# Patient Record
Sex: FEMALE | Race: BLACK OR AFRICAN AMERICAN | Employment: UNEMPLOYED
[De-identification: names, ages, dates, MRNs, and addresses within clinical notes are randomized per-mention and may not be internally consistent; named-entity substitution may affect disease eponyms.]

---

## 2017-01-04 ENCOUNTER — TELEPHONE (OUTPATIENT)
Dept: GYNECOLOGIC ONCOLOGY | Facility: CLINIC | Age: 67
End: 2017-01-04

## 2017-01-30 RX ORDER — GLUCOSAM/CHON-MSM1/C/MANG/BOSW 500-416.6
TABLET ORAL
Qty: 100 EACH | Refills: 0 | Status: SHIPPED | OUTPATIENT
Start: 2017-01-30 | End: 2017-03-01 | Stop reason: SDUPTHER

## 2017-03-01 RX ORDER — ISOPROPYL ALCOHOL 0.75 G/1
SWAB TOPICAL
Qty: 100 EACH | Refills: 0 | Status: SHIPPED | OUTPATIENT
Start: 2017-03-01 | End: 2017-03-17 | Stop reason: SDUPTHER

## 2017-03-01 RX ORDER — GLUCOSAM/CHON-MSM1/C/MANG/BOSW 500-416.6
TABLET ORAL
Qty: 100 EACH | Refills: 0 | Status: SHIPPED | OUTPATIENT
Start: 2017-03-01 | End: 2017-03-17 | Stop reason: SDUPTHER

## 2017-03-01 RX ORDER — INSULIN GLARGINE 100 [IU]/ML
INJECTION, SOLUTION SUBCUTANEOUS
Qty: 30 VIAL | Refills: 0 | Status: SHIPPED | OUTPATIENT
Start: 2017-03-01 | End: 2017-08-12 | Stop reason: SDUPTHER

## 2017-03-07 ENCOUNTER — HOSPITAL ENCOUNTER (OUTPATIENT)
Age: 67
Setting detail: SPECIMEN
Discharge: HOME | End: 2017-03-07
Payer: MEDICAID

## 2017-03-07 ENCOUNTER — OFFICE VISIT (OUTPATIENT)
Dept: INTERNAL MEDICINE | Facility: CLINIC | Age: 67
End: 2017-03-07

## 2017-03-07 VITALS
SYSTOLIC BLOOD PRESSURE: 133 MMHG | HEART RATE: 100 BPM | BODY MASS INDEX: 34.02 KG/M2 | HEIGHT: 63 IN | DIASTOLIC BLOOD PRESSURE: 73 MMHG | WEIGHT: 192 LBS

## 2017-03-07 DIAGNOSIS — K59.00 CONSTIPATION, UNSPECIFIED CONSTIPATION TYPE: ICD-10-CM

## 2017-03-07 DIAGNOSIS — R06.02 SHORTNESS OF BREATH: ICD-10-CM

## 2017-03-07 DIAGNOSIS — R19.00 PELVIC MASS IN FEMALE: ICD-10-CM

## 2017-03-07 DIAGNOSIS — E11.65 TYPE 2 DIABETES MELLITUS WITH HYPERGLYCEMIA, WITH LONG-TERM CURRENT USE OF INSULIN (HCC): ICD-10-CM

## 2017-03-07 DIAGNOSIS — E11.65 TYPE 2 DIABETES MELLITUS WITH HYPERGLYCEMIA, WITH LONG-TERM CURRENT USE OF INSULIN (HCC): Primary | ICD-10-CM

## 2017-03-07 DIAGNOSIS — R91.1 LUNG NODULE: ICD-10-CM

## 2017-03-07 DIAGNOSIS — Z79.4 TYPE 2 DIABETES MELLITUS WITH HYPERGLYCEMIA, WITH LONG-TERM CURRENT USE OF INSULIN (HCC): ICD-10-CM

## 2017-03-07 DIAGNOSIS — Z79.4 TYPE 2 DIABETES MELLITUS WITH HYPERGLYCEMIA, WITH LONG-TERM CURRENT USE OF INSULIN (HCC): Primary | ICD-10-CM

## 2017-03-07 LAB
ABSOLUTE EOS #: 0.1 K/UL (ref 0–0.4)
ABSOLUTE LYMPH #: 3.3 K/UL (ref 1–4.8)
ABSOLUTE MONO #: 0.49 K/UL (ref 0.1–0.8)
ALBUMIN SERPL-MCNC: 4.1 G/DL (ref 3.5–5.2)
ALBUMIN/GLOBULIN RATIO: 1.2 (ref 1–2.5)
ALP BLD-CCNC: 118 U/L (ref 35–104)
ALT SERPL-CCNC: 15 U/L (ref 5–33)
ANION GAP SERPL CALCULATED.3IONS-SCNC: 13 MMOL/L (ref 9–17)
AST SERPL-CCNC: 18 U/L
BASOPHILS # BLD: 0 % (ref 0–2)
BASOPHILS ABSOLUTE: 0 K/UL (ref 0–0.2)
BILIRUB SERPL-MCNC: 0.63 MG/DL (ref 0.3–1.2)
BUN BLDV-MCNC: 5 MG/DL (ref 8–23)
BUN/CREAT BLD: ABNORMAL (ref 9–20)
CALCIUM SERPL-MCNC: 9.7 MG/DL (ref 8.6–10.4)
CHLORIDE BLD-SCNC: 98 MMOL/L (ref 98–107)
CO2: 24 MMOL/L (ref 20–31)
CREAT SERPL-MCNC: 0.74 MG/DL (ref 0.5–0.9)
DIFFERENTIAL TYPE: ABNORMAL
EOSINOPHILS RELATIVE PERCENT: 1 % (ref 1–4)
ESTIMATED AVERAGE GLUCOSE: 381 MG/DL
GFR AFRICAN AMERICAN: >60 ML/MIN
GFR NON-AFRICAN AMERICAN: >60 ML/MIN
GFR SERPL CREATININE-BSD FRML MDRD: ABNORMAL ML/MIN/{1.73_M2}
GFR SERPL CREATININE-BSD FRML MDRD: ABNORMAL ML/MIN/{1.73_M2}
GLUCOSE BLD-MCNC: 331 MG/DL (ref 70–99)
HBA1C MFR BLD: 14.9 % (ref 4–6)
HCT VFR BLD CALC: 29.5 % (ref 36–46)
HEMOGLOBIN: 9 G/DL (ref 12–16)
LYMPHOCYTES # BLD: 34 % (ref 24–44)
MCH RBC QN AUTO: 18.9 PG (ref 26–34)
MCHC RBC AUTO-ENTMCNC: 30.7 G/DL (ref 31–37)
MCV RBC AUTO: 61.6 FL (ref 80–100)
MONOCYTES # BLD: 5 % (ref 1–7)
MORPHOLOGY: ABNORMAL
PDW BLD-RTO: 24.2 % (ref 12.5–15.4)
PLATELET # BLD: 141 K/UL (ref 140–450)
PLATELET ESTIMATE: ABNORMAL
PMV BLD AUTO: 9.2 FL (ref 6–12)
POTASSIUM SERPL-SCNC: 3.6 MMOL/L (ref 3.7–5.3)
RBC # BLD: 4.79 M/UL (ref 4–5.2)
RBC # BLD: ABNORMAL 10*6/UL
SEG NEUTROPHILS: 60 % (ref 36–66)
SEGMENTED NEUTROPHILS ABSOLUTE COUNT: 5.81 K/UL (ref 1.8–7.7)
SODIUM BLD-SCNC: 135 MMOL/L (ref 135–144)
TOTAL PROTEIN: 7.6 G/DL (ref 6.4–8.3)
WBC # BLD: 9.7 K/UL (ref 3.5–11)
WBC # BLD: ABNORMAL 10*3/UL

## 2017-03-07 PROCEDURE — 80053 COMPREHEN METABOLIC PANEL: CPT

## 2017-03-07 PROCEDURE — 36415 COLL VENOUS BLD VENIPUNCTURE: CPT

## 2017-03-07 PROCEDURE — 83036 HEMOGLOBIN GLYCOSYLATED A1C: CPT

## 2017-03-07 PROCEDURE — 99213 OFFICE O/P EST LOW 20 MIN: CPT | Performed by: INTERNAL MEDICINE

## 2017-03-07 PROCEDURE — 85025 COMPLETE CBC W/AUTO DIFF WBC: CPT

## 2017-03-07 RX ORDER — TRAMADOL HYDROCHLORIDE 50 MG/1
50 TABLET ORAL 2 TIMES DAILY PRN
Qty: 30 TABLET | Refills: 0 | OUTPATIENT
Start: 2017-03-07 | End: 2017-03-31 | Stop reason: SDUPTHER

## 2017-03-07 RX ORDER — PANTOPRAZOLE SODIUM 40 MG/1
40 GRANULE, DELAYED RELEASE ORAL
Qty: 30 EACH | Refills: 2 | Status: SHIPPED | OUTPATIENT
Start: 2017-03-07 | End: 2017-11-13 | Stop reason: SDUPTHER

## 2017-03-07 RX ORDER — BLOOD-GLUCOSE METER
1 KIT MISCELLANEOUS 2 TIMES DAILY
Qty: 1 KIT | Refills: 0 | OUTPATIENT
Start: 2017-03-07 | End: 2017-03-31 | Stop reason: SDUPTHER

## 2017-03-07 RX ORDER — DOCUSATE SODIUM 100 MG/1
100 CAPSULE, LIQUID FILLED ORAL DAILY PRN
Qty: 30 CAPSULE | Refills: 3 | Status: SHIPPED | OUTPATIENT
Start: 2017-03-07 | End: 2019-06-24 | Stop reason: ALTCHOICE

## 2017-03-08 ENCOUNTER — TELEPHONE (OUTPATIENT)
Dept: INTERNAL MEDICINE | Facility: CLINIC | Age: 67
End: 2017-03-08

## 2017-03-15 LAB
CONTROL: NORMAL
HEMOCCULT STL QL: NEGATIVE

## 2017-03-20 DIAGNOSIS — Z12.11 SCREENING FOR COLORECTAL CANCER: Primary | ICD-10-CM

## 2017-03-20 DIAGNOSIS — Z12.12 SCREENING FOR COLORECTAL CANCER: Primary | ICD-10-CM

## 2017-03-20 PROCEDURE — 82274 ASSAY TEST FOR BLOOD FECAL: CPT | Performed by: INTERNAL MEDICINE

## 2017-03-31 RX ORDER — BLOOD-GLUCOSE METER
1 KIT MISCELLANEOUS 2 TIMES DAILY
Qty: 1 KIT | Refills: 0 | Status: SHIPPED | OUTPATIENT
Start: 2017-03-31 | End: 2018-07-03 | Stop reason: SDUPTHER

## 2017-03-31 RX ORDER — TRAMADOL HYDROCHLORIDE 50 MG/1
50 TABLET ORAL 2 TIMES DAILY PRN
Qty: 30 TABLET | Refills: 0 | Status: SHIPPED | OUTPATIENT
Start: 2017-03-31 | End: 2018-01-22 | Stop reason: SDUPTHER

## 2017-04-17 RX ORDER — ISOPROPYL ALCOHOL 0.75 G/1
SWAB TOPICAL
Qty: 100 EACH | Refills: 0 | Status: SHIPPED | OUTPATIENT
Start: 2017-04-17 | End: 2017-10-10 | Stop reason: SDUPTHER

## 2017-04-17 RX ORDER — ISOPROPYL ALCOHOL 0.75 G/1
SWAB TOPICAL
Qty: 100 EACH | Refills: 0 | Status: SHIPPED | OUTPATIENT
Start: 2017-04-17 | End: 2017-11-13 | Stop reason: SDUPTHER

## 2017-04-17 RX ORDER — MULTIVITAMIN WITH FOLIC ACID 400 MCG
TABLET ORAL
Qty: 30 TABLET | Refills: 0 | Status: SHIPPED | OUTPATIENT
Start: 2017-04-17 | End: 2017-10-10 | Stop reason: SDUPTHER

## 2017-04-17 RX ORDER — PANTOPRAZOLE SODIUM 40 MG/1
TABLET, DELAYED RELEASE ORAL
Qty: 30 TABLET | Refills: 0 | Status: SHIPPED | OUTPATIENT
Start: 2017-04-17 | End: 2017-10-10 | Stop reason: SDUPTHER

## 2017-04-17 RX ORDER — INSULIN GLARGINE 100 [IU]/ML
INJECTION, SOLUTION SUBCUTANEOUS
Qty: 30 VIAL | Refills: 0 | Status: SHIPPED | OUTPATIENT
Start: 2017-04-17 | End: 2017-07-27 | Stop reason: SDUPTHER

## 2017-04-25 ENCOUNTER — TELEPHONE (OUTPATIENT)
Dept: INTERNAL MEDICINE | Age: 67
End: 2017-04-25

## 2017-05-15 ENCOUNTER — TELEPHONE (OUTPATIENT)
Dept: PODIATRY | Age: 67
End: 2017-05-15

## 2017-05-25 RX ORDER — GABAPENTIN 300 MG/1
300 CAPSULE ORAL 3 TIMES DAILY
Qty: 90 CAPSULE | Refills: 5 | Status: SHIPPED | OUTPATIENT
Start: 2017-05-25 | End: 2017-11-08 | Stop reason: SDUPTHER

## 2017-05-25 RX ORDER — LISINOPRIL 5 MG/1
5 TABLET ORAL DAILY
Qty: 30 TABLET | Refills: 5 | Status: SHIPPED | OUTPATIENT
Start: 2017-05-25 | End: 2017-11-08 | Stop reason: SDUPTHER

## 2017-06-22 ENCOUNTER — PATIENT MESSAGE (OUTPATIENT)
Dept: INTERNAL MEDICINE | Age: 67
End: 2017-06-22

## 2017-06-27 ENCOUNTER — TELEPHONE (OUTPATIENT)
Dept: INTERNAL MEDICINE | Age: 67
End: 2017-06-27

## 2017-07-27 ENCOUNTER — PATIENT MESSAGE (OUTPATIENT)
Dept: INTERNAL MEDICINE | Age: 67
End: 2017-07-27

## 2017-07-27 RX ORDER — INSULIN GLARGINE 100 [IU]/ML
32 INJECTION, SOLUTION SUBCUTANEOUS NIGHTLY
Qty: 10 VIAL | Refills: 1 | Status: SHIPPED | OUTPATIENT
Start: 2017-07-27 | End: 2017-10-10 | Stop reason: SDUPTHER

## 2017-08-10 RX ORDER — PANTOPRAZOLE SODIUM 40 MG/1
TABLET, DELAYED RELEASE ORAL
Qty: 30 TABLET | Refills: 0 | Status: SHIPPED | OUTPATIENT
Start: 2017-08-10 | End: 2017-11-13 | Stop reason: SDUPTHER

## 2017-08-14 RX ORDER — INSULIN GLARGINE 100 [IU]/ML
INJECTION, SOLUTION SUBCUTANEOUS
Qty: 30 VIAL | Refills: 0 | Status: SHIPPED | OUTPATIENT
Start: 2017-08-14 | End: 2017-11-13 | Stop reason: SDUPTHER

## 2017-08-16 RX ORDER — LANCETS 30 GAUGE
EACH MISCELLANEOUS
Qty: 100 EACH | Refills: 0 | Status: SHIPPED | OUTPATIENT
Start: 2017-08-16 | End: 2017-11-13

## 2017-10-04 ENCOUNTER — TELEPHONE (OUTPATIENT)
Dept: INTERNAL MEDICINE | Age: 67
End: 2017-10-04

## 2017-10-04 NOTE — TELEPHONE ENCOUNTER
Call to pt. Last apt in march, trying to reach out get pt scheduled and work on barriers to care for pt , no answer phone just rings does not go to voice mail.

## 2017-10-10 RX ORDER — INSULIN GLARGINE 100 [IU]/ML
32 INJECTION, SOLUTION SUBCUTANEOUS NIGHTLY
Qty: 10 VIAL | Refills: 0 | Status: SHIPPED | OUTPATIENT
Start: 2017-10-10 | End: 2017-11-08 | Stop reason: SDUPTHER

## 2017-10-10 RX ORDER — PANTOPRAZOLE SODIUM 40 MG/1
TABLET, DELAYED RELEASE ORAL
Qty: 30 TABLET | Refills: 0 | Status: SHIPPED | OUTPATIENT
Start: 2017-10-10 | End: 2017-11-08 | Stop reason: SDUPTHER

## 2017-10-10 RX ORDER — MULTIVITAMIN WITH FOLIC ACID 400 MCG
TABLET ORAL
Qty: 30 TABLET | Refills: 0 | Status: SHIPPED | OUTPATIENT
Start: 2017-10-10 | End: 2017-11-08 | Stop reason: SDUPTHER

## 2017-10-10 RX ORDER — ISOPROPYL ALCOHOL 0.75 G/1
SWAB TOPICAL
Qty: 100 EACH | Refills: 0 | Status: SHIPPED | OUTPATIENT
Start: 2017-10-10 | End: 2017-11-08 | Stop reason: SDUPTHER

## 2017-11-07 ENCOUNTER — TELEPHONE (OUTPATIENT)
Dept: OTHER | Facility: CLINIC | Age: 67
End: 2017-11-07

## 2017-11-08 RX ORDER — PANTOPRAZOLE SODIUM 40 MG/1
TABLET, DELAYED RELEASE ORAL
Qty: 30 TABLET | Refills: 0 | Status: SHIPPED | OUTPATIENT
Start: 2017-11-08 | End: 2017-12-07 | Stop reason: SDUPTHER

## 2017-11-08 RX ORDER — LISINOPRIL 5 MG/1
5 TABLET ORAL DAILY
Qty: 30 TABLET | Refills: 0 | Status: SHIPPED | OUTPATIENT
Start: 2017-11-08 | End: 2017-12-07 | Stop reason: SDUPTHER

## 2017-11-08 RX ORDER — INSULIN GLARGINE 100 [IU]/ML
INJECTION, SOLUTION SUBCUTANEOUS
Qty: 10 VIAL | Refills: 0 | Status: SHIPPED | OUTPATIENT
Start: 2017-11-08 | End: 2017-11-13 | Stop reason: SDUPTHER

## 2017-11-08 RX ORDER — MULTIVITAMIN WITH FOLIC ACID 400 MCG
TABLET ORAL
Qty: 30 TABLET | Refills: 0 | Status: SHIPPED | OUTPATIENT
Start: 2017-11-08 | End: 2017-12-07 | Stop reason: SDUPTHER

## 2017-11-08 RX ORDER — ISOPROPYL ALCOHOL 0.75 G/1
SWAB TOPICAL
Qty: 100 EACH | Refills: 0 | Status: SHIPPED | OUTPATIENT
Start: 2017-11-08 | End: 2018-07-03 | Stop reason: SDUPTHER

## 2017-11-08 RX ORDER — GABAPENTIN 300 MG/1
CAPSULE ORAL
Qty: 90 CAPSULE | Refills: 0 | Status: SHIPPED | OUTPATIENT
Start: 2017-11-08 | End: 2017-12-07 | Stop reason: SDUPTHER

## 2017-11-08 NOTE — TELEPHONE ENCOUNTER
Escribe request for 6 medications pended . Please escribe if appropriate      Next Visit Date:  None, cancelled multiple appts, message to pharmacy to add message on script to call for an appt before any additional refills     Health Maintenance   Topic Date Due    Hepatitis C screen  1950    Diabetic foot exam  06/19/2016    Diabetic retinal exam  06/19/2016    Diabetic hemoglobin A1C test  06/07/2017    Flu vaccine (1) 09/01/2017    Pneumococcal low/med risk (2 of 2 - PPSV23) 10/07/2017    Diabetic microalbuminuria test  10/29/2017    Lipid screen  10/29/2017    Colon Cancer Screen FIT/FOBT  03/15/2018    Breast cancer screen  10/29/2018    DTaP/Tdap/Td vaccine (2 - Td) 02/01/2023    Zostavax vaccine  Addressed    DEXA (modify frequency per FRAX score)  Completed       Hemoglobin A1C (%)   Date Value   03/07/2017 14.9 (H)   10/07/2016 6.9   06/19/2015 8.8             ( goal A1C is < 7)   Microalb/Crt.  Ratio (mcg/mg creat)   Date Value   10/29/2016 47 (H)     LDL Cholesterol (mg/dL)   Date Value   10/29/2016 89       (goal LDL is <100)   AST (U/L)   Date Value   03/07/2017 18     ALT (U/L)   Date Value   03/07/2017 15     BUN (mg/dL)   Date Value   03/07/2017 5 (L)     BP Readings from Last 3 Encounters:   03/07/17 133/73   12/01/16 (!) 169/73   12/01/16 136/76          (goal 120/80)          Patient Active Problem List:     Fibroid     Lung nodule     Leiomyomatosis     Restrictive lung disease     Radiculopathy, cervical     Renal mass     DR (diabetic retinopathy) (HCC)     HTN (hypertension)     DM (diabetes mellitus) ([address]     Dermatomyositis ([address]     Immunization due     OA (osteoarthritis)     Hyperlipidemia     Anxiety and depression     Screening for breast cancer     Adnexal mass     Pelvic mass     Thyroid nodule

## 2017-11-13 ENCOUNTER — HOSPITAL ENCOUNTER (OUTPATIENT)
Age: 67
Setting detail: SPECIMEN
Discharge: HOME | End: 2017-11-13
Payer: MEDICAID

## 2017-11-13 ENCOUNTER — OFFICE VISIT (OUTPATIENT)
Dept: INTERNAL MEDICINE | Age: 67
End: 2017-11-13
Payer: MEDICAID

## 2017-11-13 VITALS
HEIGHT: 65 IN | HEART RATE: 110 BPM | BODY MASS INDEX: 32.65 KG/M2 | SYSTOLIC BLOOD PRESSURE: 145 MMHG | WEIGHT: 196 LBS | DIASTOLIC BLOOD PRESSURE: 81 MMHG

## 2017-11-13 DIAGNOSIS — Z23 NEEDS FLU SHOT: ICD-10-CM

## 2017-11-13 DIAGNOSIS — Z11.59 NEED FOR HEPATITIS C SCREENING TEST: ICD-10-CM

## 2017-11-13 DIAGNOSIS — Z13.31 POSITIVE DEPRESSION SCREENING: ICD-10-CM

## 2017-11-13 DIAGNOSIS — M79.602 LEFT ARM PAIN: ICD-10-CM

## 2017-11-13 DIAGNOSIS — E11.65 TYPE 2 DIABETES MELLITUS WITH HYPERGLYCEMIA, WITH LONG-TERM CURRENT USE OF INSULIN (HCC): Primary | ICD-10-CM

## 2017-11-13 DIAGNOSIS — Z79.4 TYPE 2 DIABETES MELLITUS WITH HYPERGLYCEMIA, WITH LONG-TERM CURRENT USE OF INSULIN (HCC): ICD-10-CM

## 2017-11-13 DIAGNOSIS — R19.00 PELVIC MASS: ICD-10-CM

## 2017-11-13 DIAGNOSIS — Z23 NEED FOR VACCINATION FOR PNEUMOCOCCUS: ICD-10-CM

## 2017-11-13 DIAGNOSIS — E11.65 TYPE 2 DIABETES MELLITUS WITH HYPERGLYCEMIA, WITH LONG-TERM CURRENT USE OF INSULIN (HCC): ICD-10-CM

## 2017-11-13 DIAGNOSIS — Z79.4 TYPE 2 DIABETES MELLITUS WITH HYPERGLYCEMIA, WITH LONG-TERM CURRENT USE OF INSULIN (HCC): Primary | ICD-10-CM

## 2017-11-13 LAB
ABSOLUTE EOS #: 0.08 K/UL (ref 0–0.44)
ABSOLUTE IMMATURE GRANULOCYTE: 0.03 K/UL (ref 0–0.3)
ABSOLUTE LYMPH #: 2.9 K/UL (ref 1.1–3.7)
ABSOLUTE MONO #: 0.79 K/UL (ref 0.1–1.2)
ALBUMIN SERPL-MCNC: 3.8 G/DL (ref 3.5–5.2)
ALBUMIN/GLOBULIN RATIO: 1 (ref 1–2.5)
ALP BLD-CCNC: 115 U/L (ref 35–104)
ALT SERPL-CCNC: 18 U/L (ref 5–33)
ANION GAP SERPL CALCULATED.3IONS-SCNC: 16 MMOL/L (ref 9–17)
AST SERPL-CCNC: 21 U/L
BASOPHILS # BLD: 1 % (ref 0–2)
BASOPHILS ABSOLUTE: 0.1 K/UL (ref 0–0.2)
BILIRUB SERPL-MCNC: 0.66 MG/DL (ref 0.3–1.2)
BUN BLDV-MCNC: 9 MG/DL (ref 8–23)
BUN/CREAT BLD: ABNORMAL (ref 9–20)
CALCIUM SERPL-MCNC: 9.4 MG/DL (ref 8.6–10.4)
CHLORIDE BLD-SCNC: 99 MMOL/L (ref 98–107)
CHOLESTEROL/HDL RATIO: 4.5
CHOLESTEROL: 188 MG/DL
CO2: 22 MMOL/L (ref 20–31)
CREAT SERPL-MCNC: 0.55 MG/DL (ref 0.5–0.9)
CREATININE URINE: 124.3 MG/DL (ref 28–217)
DIFFERENTIAL TYPE: ABNORMAL
EOSINOPHILS RELATIVE PERCENT: 1 % (ref 1–4)
GFR AFRICAN AMERICAN: >60 ML/MIN
GFR NON-AFRICAN AMERICAN: >60 ML/MIN
GFR SERPL CREATININE-BSD FRML MDRD: ABNORMAL ML/MIN/{1.73_M2}
GFR SERPL CREATININE-BSD FRML MDRD: ABNORMAL ML/MIN/{1.73_M2}
GLUCOSE BLD-MCNC: 301 MG/DL (ref 70–99)
HBA1C MFR BLD: 13.3 %
HCT VFR BLD CALC: 36.1 % (ref 36.3–47.1)
HDLC SERPL-MCNC: 42 MG/DL
HEMOGLOBIN: 9.9 G/DL (ref 11.9–15.1)
HEPATITIS C ANTIBODY: NONREACTIVE
IMMATURE GRANULOCYTES: 0 %
LDL CHOLESTEROL: 123 MG/DL (ref 0–130)
LYMPHOCYTES # BLD: 33 % (ref 24–43)
MCH RBC QN AUTO: 19.6 PG (ref 25.2–33.5)
MCHC RBC AUTO-ENTMCNC: 27.4 G/DL (ref 28.4–34.8)
MCV RBC AUTO: 71.5 FL (ref 82.6–102.9)
MICROALBUMIN/CREAT 24H UR: 146 MG/L
MICROALBUMIN/CREAT UR-RTO: 117 MCG/MG CREAT
MONOCYTES # BLD: 9 % (ref 3–12)
PDW BLD-RTO: 18.7 % (ref 11.8–14.4)
PLATELET # BLD: ABNORMAL K/UL (ref 138–453)
PLATELET ESTIMATE: ABNORMAL
PLATELET, FLUORESCENCE: 270 K/UL (ref 138–453)
PLATELET, IMMATURE FRACTION: 6 % (ref 1.1–10.3)
PMV BLD AUTO: ABNORMAL FL (ref 8.1–13.5)
POTASSIUM SERPL-SCNC: 3.3 MMOL/L (ref 3.7–5.3)
RBC # BLD: 5.05 M/UL (ref 3.95–5.11)
RBC # BLD: ABNORMAL 10*6/UL
SEG NEUTROPHILS: 56 % (ref 36–65)
SEGMENTED NEUTROPHILS ABSOLUTE COUNT: 5.03 K/UL (ref 1.5–8.1)
SODIUM BLD-SCNC: 137 MMOL/L (ref 135–144)
TOTAL PROTEIN: 7.6 G/DL (ref 6.4–8.3)
TRIGL SERPL-MCNC: 116 MG/DL
VLDLC SERPL CALC-MCNC: NORMAL MG/DL (ref 1–30)
WBC # BLD: 8.9 K/UL (ref 3.5–11.3)
WBC # BLD: ABNORMAL 10*3/UL

## 2017-11-13 PROCEDURE — 3046F HEMOGLOBIN A1C LEVEL >9.0%: CPT | Performed by: INTERNAL MEDICINE

## 2017-11-13 PROCEDURE — 86803 HEPATITIS C AB TEST: CPT

## 2017-11-13 PROCEDURE — 85025 COMPLETE CBC W/AUTO DIFF WBC: CPT

## 2017-11-13 PROCEDURE — 80061 LIPID PANEL: CPT

## 2017-11-13 PROCEDURE — G8427 DOCREV CUR MEDS BY ELIG CLIN: HCPCS | Performed by: INTERNAL MEDICINE

## 2017-11-13 PROCEDURE — 1090F PRES/ABSN URINE INCON ASSESS: CPT | Performed by: INTERNAL MEDICINE

## 2017-11-13 PROCEDURE — 82570 ASSAY OF URINE CREATININE: CPT

## 2017-11-13 PROCEDURE — G0444 DEPRESSION SCREEN ANNUAL: HCPCS | Performed by: INTERNAL MEDICINE

## 2017-11-13 PROCEDURE — 82043 UR ALBUMIN QUANTITATIVE: CPT

## 2017-11-13 PROCEDURE — 3017F COLORECTAL CA SCREEN DOC REV: CPT | Performed by: INTERNAL MEDICINE

## 2017-11-13 PROCEDURE — G8484 FLU IMMUNIZE NO ADMIN: HCPCS | Performed by: INTERNAL MEDICINE

## 2017-11-13 PROCEDURE — 1036F TOBACCO NON-USER: CPT | Performed by: INTERNAL MEDICINE

## 2017-11-13 PROCEDURE — 99214 OFFICE O/P EST MOD 30 MIN: CPT | Performed by: INTERNAL MEDICINE

## 2017-11-13 PROCEDURE — 3014F SCREEN MAMMO DOC REV: CPT | Performed by: INTERNAL MEDICINE

## 2017-11-13 PROCEDURE — G8417 CALC BMI ABV UP PARAM F/U: HCPCS | Performed by: INTERNAL MEDICINE

## 2017-11-13 PROCEDURE — G8399 PT W/DXA RESULTS DOCUMENT: HCPCS | Performed by: INTERNAL MEDICINE

## 2017-11-13 PROCEDURE — 4040F PNEUMOC VAC/ADMIN/RCVD: CPT | Performed by: INTERNAL MEDICINE

## 2017-11-13 PROCEDURE — 83036 HEMOGLOBIN GLYCOSYLATED A1C: CPT | Performed by: INTERNAL MEDICINE

## 2017-11-13 PROCEDURE — 1123F ACP DISCUSS/DSCN MKR DOCD: CPT | Performed by: INTERNAL MEDICINE

## 2017-11-13 PROCEDURE — 80053 COMPREHEN METABOLIC PANEL: CPT

## 2017-11-13 PROCEDURE — 36415 COLL VENOUS BLD VENIPUNCTURE: CPT

## 2017-11-13 RX ORDER — INSULIN GLARGINE 100 [IU]/ML
40 INJECTION, SOLUTION SUBCUTANEOUS NIGHTLY
Qty: 10 VIAL | Refills: 0 | Status: SHIPPED | OUTPATIENT
Start: 2017-11-13 | End: 2017-12-21 | Stop reason: SDUPTHER

## 2017-11-13 RX ORDER — CYCLOBENZAPRINE HCL 5 MG
5 TABLET ORAL 2 TIMES DAILY PRN
Qty: 50 TABLET | Refills: 1 | Status: SHIPPED | OUTPATIENT
Start: 2017-11-13 | End: 2018-01-02 | Stop reason: SDUPTHER

## 2017-11-13 ASSESSMENT — PATIENT HEALTH QUESTIONNAIRE - PHQ9
7. TROUBLE CONCENTRATING ON THINGS, SUCH AS READING THE NEWSPAPER OR WATCHING TELEVISION: 3
3. TROUBLE FALLING OR STAYING ASLEEP: 3
SUM OF ALL RESPONSES TO PHQ9 QUESTIONS 1 & 2: 1
2. FEELING DOWN, DEPRESSED OR HOPELESS: 1
10. IF YOU CHECKED OFF ANY PROBLEMS, HOW DIFFICULT HAVE THESE PROBLEMS MADE IT FOR YOU TO DO YOUR WORK, TAKE CARE OF THINGS AT HOME, OR GET ALONG WITH OTHER PEOPLE: 1
6. FEELING BAD ABOUT YOURSELF - OR THAT YOU ARE A FAILURE OR HAVE LET YOURSELF OR YOUR FAMILY DOWN: 0
1. LITTLE INTEREST OR PLEASURE IN DOING THINGS: 0
5. POOR APPETITE OR OVEREATING: 1
SUM OF ALL RESPONSES TO PHQ QUESTIONS 1-9: 11
9. THOUGHTS THAT YOU WOULD BE BETTER OFF DEAD, OR OF HURTING YOURSELF: 0
8. MOVING OR SPEAKING SO SLOWLY THAT OTHER PEOPLE COULD HAVE NOTICED. OR THE OPPOSITE, BEING SO FIGETY OR RESTLESS THAT YOU HAVE BEEN MOVING AROUND A LOT MORE THAN USUAL: 0
4. FEELING TIRED OR HAVING LITTLE ENERGY: 3

## 2017-11-15 NOTE — PROGRESS NOTES
Subjective:      Patient ID: Anna Marie Monterroso is a 75 y.o. female. HPI   patient here for  Follow up on multiple medical problems  Was last seen in March 2017  diabetes- A1c today-13.3 supposed to be  on her Lantus 32 units but as per patient she takes 13 units every day. she has not been checking her sugars        Was admitted to [facility] last year for anemia secondary to a bleeding duodenal ulcer. She received 6 blood transfusions while she was in there. last hemoglobin -9.0 in 03/17    Was also found to have a nodule, biopsy was done-negative for malignancy  Also was found to have a pelvic mass and gyn follow-up was recommended-she saw them once but did not keep follow-up appointments with       Has chronic pain secondary to osteoarthritis-cannot take NSAIDs because of the duodenal ulcer   Complains of worsened pain in her left arm, does have history of cervical radiculopathy      Needs flu shot and Pneumovax. Also needs screening for HIV, hepatitis C. Her depression screen came back positive today but she is not interested in getting started on a medication or seeing psychology psychiatry    Review of Systems  Respiratory: Negative for cough, shortness of breath, wheezing and stridor. Cardiovascular: Negative for chest pain, palpitations and leg swelling. Gastrointestinal: Negative for nausea, vomiting, abdominal pain, diarrhea and constipation. Objective:   Physical Exam  Constitutional: She is oriented to person, place, and time. She appears well-developed. No distress. Cardiovascular: Normal rate and regular rhythm. Pulmonary/Chest: Effort normal and breath sounds normal. No stridor. No respiratory distress. no wheezes. no rales. Abdominal: Soft. Bowel sounds are normal.  no distension. There is no tenderness. There is no rebound and no guarding. Musculoskeletal:  no edema and no tenderness. Assessment:      1.  Type 2 diabetes mellitus with hyperglycemia, with long-term

## 2017-11-28 ENCOUNTER — HOSPITAL ENCOUNTER (OUTPATIENT)
Dept: PHYSICAL THERAPY | Age: 67
Setting detail: THERAPIES SERIES
Discharge: HOME | End: 2017-11-28

## 2017-11-28 NOTE — FLOWSHEET NOTE
[] Jessica Steve        Outpatient Physical                Therapy       [address]       Phone: [phone]       Fax: [phone] [] [facility] at [address]       Phone: [phone]       Fax: [phone] [] [facility]. [address] [place] [address]      Phone: [phone]      Fax:  [phone]     Physical Therapy Cancel/No Show note    Date: 2017  Patient: Anna Marie Villanueva  : 1950  MRN: [id number]    Cancels/No Shows to date:     For today's appointment patient:  [x]  Cancelled  []  Rescheduled appointment  []  No-show     Reason given by patient:  []  Patient ill  []  Conflicting appointment  [x]  No transportation    []  Conflict with work  []  No reason given  []  Weather related  []  Other:      Comments:  Cancelled initial evaluation due to no cab service.     []  Next appointment was confirmed    Electronically signed by: Charmayne Honer, PT

## 2017-12-21 RX ORDER — BLOOD-GLUCOSE METER
1 KIT MISCELLANEOUS 3 TIMES DAILY
Qty: 1 KIT | Refills: 0 | Status: SHIPPED | OUTPATIENT
Start: 2017-12-21 | End: 2018-07-03

## 2017-12-21 RX ORDER — INSULIN GLARGINE 100 [IU]/ML
40 INJECTION, SOLUTION SUBCUTANEOUS NIGHTLY
Qty: 10 VIAL | Refills: 0 | Status: SHIPPED | OUTPATIENT
Start: 2017-12-21 | End: 2017-12-22 | Stop reason: SDUPTHER

## 2017-12-21 NOTE — TELEPHONE ENCOUNTER
PC to PCP--patient calling in because she was informed by her insurance company that in order for her to receive an Glucometer it has to be an One Touch Ultra. She says they stated that if we have any issues or questions to please call them at [phone].

## 2017-12-22 RX ORDER — INSULIN GLARGINE 100 [IU]/ML
48 INJECTION, SOLUTION SUBCUTANEOUS NIGHTLY
Qty: 10 VIAL | Refills: 0 | Status: SHIPPED | OUTPATIENT
Start: 2017-12-22 | End: 2018-01-02 | Stop reason: SDUPTHER

## 2017-12-22 NOTE — TELEPHONE ENCOUNTER
PC from pt questioning lantus dosage, pt states that she was hospitalized at [facility] on 12/20-12/21 and they made changes to her Lantus dose so pharmacy is confused on how medication should be refilled. Med refills for Lantus sent from PCP for 40 units nightly but [facility] advised pt to take 48 units nightly. No ED visit noted received by office yet    Pt also wants to inform MD that while hospitalized they discovered she has a heart murmur and started her on Eliquis 5 MG BID, Metoprolol 25 MG BID and  Aspirin 81 MG daily.      Please review and advise

## 2017-12-23 NOTE — TELEPHONE ENCOUNTER
I have ordered Lantus 48 units  Please try to obtain discharge summary from [facility]  Also, patient does not have a future appointment, please encourage her to make one

## 2017-12-26 NOTE — TELEPHONE ENCOUNTER
PC to pt--spoke to pt and gave her the message of taking Lantus 48 units and made her an appt for 12/28/17. Waiting for records to be sent from [facility].

## 2017-12-27 ENCOUNTER — PATIENT MESSAGE (OUTPATIENT)
Dept: INTERNAL MEDICINE | Age: 67
End: 2017-12-27

## 2017-12-27 RX ORDER — LOVASTATIN 20 MG/1
TABLET ORAL
Qty: 30 TABLET | Refills: 0 | Status: SHIPPED | OUTPATIENT
Start: 2017-12-27 | End: 2018-01-29 | Stop reason: SDUPTHER

## 2017-12-27 NOTE — TELEPHONE ENCOUNTER
Faxed from pharmacy about lancets, pt got new machine and now needs a new script for lancets. Medication pended.

## 2017-12-28 RX ORDER — LANCETS 30 GAUGE
EACH MISCELLANEOUS
Qty: 100 EACH | Refills: 3 | Status: SHIPPED | OUTPATIENT
Start: 2017-12-28 | End: 2018-01-02 | Stop reason: SDUPTHER

## 2017-12-28 NOTE — TELEPHONE ENCOUNTER
From: Anna Marie Villanueva  To: Yumi Montes MD  Sent: 12/27/2017 6:22 PM EST  Subject: Visit Follow-Up Question    In just got out of the hospital and I need to see you as soon as possible my blood sugar levels are between 300/400 so I am feeling desperate I don't know what to eat to get my level down I need help I had to cancel my appointment with you for tomorrow because I had to keep my appointment to keep my apartment can you see me as soon as possible please let me know.

## 2018-01-02 ENCOUNTER — OFFICE VISIT (OUTPATIENT)
Dept: INTERNAL MEDICINE | Age: 68
End: 2018-01-02
Payer: MEDICAID

## 2018-01-02 VITALS
WEIGHT: 196 LBS | DIASTOLIC BLOOD PRESSURE: 78 MMHG | HEART RATE: 64 BPM | BODY MASS INDEX: 32.62 KG/M2 | SYSTOLIC BLOOD PRESSURE: 138 MMHG

## 2018-01-02 DIAGNOSIS — Z79.4 TYPE 2 DIABETES MELLITUS WITH HYPERGLYCEMIA, WITH LONG-TERM CURRENT USE OF INSULIN (HCC): Primary | ICD-10-CM

## 2018-01-02 DIAGNOSIS — E11.65 TYPE 2 DIABETES MELLITUS WITH HYPERGLYCEMIA, WITH LONG-TERM CURRENT USE OF INSULIN (HCC): Primary | ICD-10-CM

## 2018-01-02 PROCEDURE — 99213 OFFICE O/P EST LOW 20 MIN: CPT | Performed by: INTERNAL MEDICINE

## 2018-01-02 RX ORDER — ASPIRIN 81 MG/1
81 TABLET ORAL DAILY
COMMUNITY
End: 2018-01-22 | Stop reason: SDUPTHER

## 2018-01-02 RX ORDER — LANCETS 30 GAUGE
EACH MISCELLANEOUS
Qty: 100 EACH | Refills: 3 | Status: SHIPPED | OUTPATIENT
Start: 2018-01-02 | End: 2018-07-03 | Stop reason: SDUPTHER

## 2018-01-02 RX ORDER — INSULIN GLARGINE 100 [IU]/ML
60 INJECTION, SOLUTION SUBCUTANEOUS NIGHTLY
Qty: 10 VIAL | Refills: 0 | Status: SHIPPED | OUTPATIENT
Start: 2018-01-02 | End: 2018-01-30 | Stop reason: SDUPTHER

## 2018-01-02 RX ORDER — BLOOD SUGAR DIAGNOSTIC
1 STRIP MISCELLANEOUS DAILY
Qty: 100 EACH | Refills: 3 | Status: SHIPPED | OUTPATIENT
Start: 2018-01-02 | End: 2018-07-03 | Stop reason: SDUPTHER

## 2018-01-02 NOTE — PROGRESS NOTES
Subjective:      Patient ID: Anna Marie Feliciano is a 75 y.o. female. HPI patient here for  Follow up on multiple medical problems  Was recently admitted to [facility] for Atrial flutter- was started on Eliquis  DM-A1C above 14-lantus was increased to 48 units, glucose continues to be in 300-400's    Was admitted to [facility] last year for anemia secondary to a bleeding duodenal ulcer. She received 6 blood transfusions while she was in there. last hemoglobin -9.9 in 11/17    Was also found to have a nodule, biopsy was done-negative for malignancy  Also was found to have a pelvic mass and gyn follow-up was recommended-she  did not keep follow-up appointments with them     Has chronic pain secondary to osteoarthritis-cannot take NSAIDs because of the duodenal ulcer   Complains of worsened pain in her left arm, does have history of cervical radiculopathy          Her depression screen was  positive last time  but she is not interested in getting started on a medication or seeing psychology or psychiatry    Review of Systems   Respiratory: Negative for cough, shortness of breath, wheezing and stridor. Cardiovascular: Negative for chest pain, palpitations and leg swelling. Gastrointestinal: Negative for nausea, vomiting, abdominal pain, diarrhea and constipation. Objective:   Physical Exam  Constitutional: She is oriented to person, place, and time. She appears well-developed. No distress. Cardiovascular: Normal rate and regular rhythm. Pulmonary/Chest: Effort normal and breath sounds normal. No stridor. No respiratory distress. no wheezes. no rales. Abdominal: Soft. Bowel sounds are normal.  no distension. There is no tenderness. There is no rebound and no guarding. Musculoskeletal:  no edema and no tenderness. Assessment:   1. DM  2. Atrial flutter  3. Pelvic mass   Plan:   1. Increase Lantus to 60 units.  She is also going to enroll for starting fresh program.  Patient to continue monitoring her glucose and send readings via my chart  2. Continue Eliquis  3. I have provided her with the number for gynecology oncology, she needs to call them and scheduled an appointment as soon as possible,  4. Return for as scheduled in feb 2018.

## 2018-01-02 NOTE — PROGRESS NOTES
S: pt presents at clinic today for post hosp d/catrial flutter w/ HR > 120 & DM A1c >14    O: pt oriented times 4 , Skin warm ,dry meds list update , started on several new meds lantus now at 48 units nightly on eliquis 5mg bid, metoprolol 25mg bid, ASA 81qd. Pt discharge instructions scanned into chart. BS taken from pt glucometer:  Date:  BS  Time  1/1  453  331p  12/31  349  843p  12/30  376  509p  12/30  350  1241a  12/29  371  108a    12/28  367  1239p  12/28  365  1241a  12/27  404  354p  12/27  442  1103a   12/27  388  318a  12/26  404  446p    A:  (b/p taken see vitals in chart , discussed with physician), Dr. Rosalia Birch in to see pt.     P: pt return to clinic 2/8/18

## 2018-01-03 ENCOUNTER — PATIENT MESSAGE (OUTPATIENT)
Dept: INTERNAL MEDICINE | Age: 68
End: 2018-01-03

## 2018-01-03 RX ORDER — CYCLOBENZAPRINE HCL 5 MG
5 TABLET ORAL 2 TIMES DAILY PRN
Qty: 50 TABLET | Refills: 0 | Status: SHIPPED | OUTPATIENT
Start: 2018-01-03 | End: 2019-06-24 | Stop reason: ALTCHOICE

## 2018-01-04 RX ORDER — LANCETS 30 GAUGE
EACH MISCELLANEOUS
Qty: 100 EACH | Refills: 3 | Status: SHIPPED | OUTPATIENT
Start: 2018-01-04 | End: 2019-04-22 | Stop reason: SDUPTHER

## 2018-01-04 RX ORDER — SYRINGE-NEEDLE,INSULIN,0.5 ML 27GX1/2"
1 SYRINGE, EMPTY DISPOSABLE MISCELLANEOUS 4 TIMES DAILY
Qty: 100 EACH | Refills: 3 | Status: SHIPPED | OUTPATIENT
Start: 2018-01-04 | End: 2018-07-03 | Stop reason: SDUPTHER

## 2018-01-10 ENCOUNTER — TELEPHONE (OUTPATIENT)
Dept: INTERNAL MEDICINE | Age: 68
End: 2018-01-10

## 2018-01-12 ENCOUNTER — TELEPHONE (OUTPATIENT)
Dept: INTERNAL MEDICINE | Age: 68
End: 2018-01-12

## 2018-01-12 NOTE — TELEPHONE ENCOUNTER
Pt called writer and stated she had been out of town but she had been able to get the correct lancets and had been taking he BS .

## 2018-01-12 NOTE — TELEPHONE ENCOUNTER
Telephone Outcome: Unable to reach / no voice mail.just rings  Telephone Outcome: Unable to reach / no voice mail.just rings

## 2018-01-22 DIAGNOSIS — M54.12 RADICULOPATHY, CERVICAL: Primary | ICD-10-CM

## 2018-01-22 RX ORDER — ASPIRIN 81 MG/1
81 TABLET ORAL DAILY
Qty: 30 TABLET | Refills: 5 | Status: SHIPPED | OUTPATIENT
Start: 2018-01-22 | End: 2018-07-16 | Stop reason: SDUPTHER

## 2018-01-22 RX ORDER — TRAMADOL HYDROCHLORIDE 50 MG/1
50 TABLET ORAL 2 TIMES DAILY PRN
Qty: 14 TABLET | Refills: 0 | OUTPATIENT
Start: 2018-01-22 | End: 2018-02-21

## 2018-01-29 RX ORDER — GABAPENTIN 300 MG/1
CAPSULE ORAL
Qty: 90 CAPSULE | Refills: 0 | Status: SHIPPED | OUTPATIENT
Start: 2018-01-29 | End: 2018-02-21 | Stop reason: SDUPTHER

## 2018-01-29 RX ORDER — LISINOPRIL 5 MG/1
5 TABLET ORAL DAILY
Qty: 30 TABLET | Refills: 0 | Status: SHIPPED | OUTPATIENT
Start: 2018-01-29 | End: 2018-02-21 | Stop reason: SDUPTHER

## 2018-01-29 RX ORDER — PANTOPRAZOLE SODIUM 40 MG/1
TABLET, DELAYED RELEASE ORAL
Qty: 30 TABLET | Refills: 0 | Status: SHIPPED | OUTPATIENT
Start: 2018-01-29 | End: 2018-02-21 | Stop reason: SDUPTHER

## 2018-01-29 RX ORDER — LOVASTATIN 20 MG/1
TABLET ORAL
Qty: 30 TABLET | Refills: 0 | Status: SHIPPED | OUTPATIENT
Start: 2018-01-29 | End: 2018-02-21 | Stop reason: SDUPTHER

## 2018-01-30 RX ORDER — INSULIN GLARGINE 100 [IU]/ML
60 INJECTION, SOLUTION SUBCUTANEOUS NIGHTLY
Qty: 10 VIAL | Refills: 0 | Status: SHIPPED | OUTPATIENT
Start: 2018-01-30 | End: 2018-02-14 | Stop reason: SDUPTHER

## 2018-02-14 RX ORDER — INSULIN GLARGINE 100 [IU]/ML
60 INJECTION, SOLUTION SUBCUTANEOUS NIGHTLY
Qty: 10 VIAL | Refills: 0 | Status: SHIPPED | OUTPATIENT
Start: 2018-02-14 | End: 2018-02-28 | Stop reason: SDUPTHER

## 2018-02-21 NOTE — TELEPHONE ENCOUNTER
escrib request for alcohol swabs, gabapentin, multivitamin, lisinopril, protonix, mevacor patient does not have scheduled apt left voicemail to contact office to schedule future apt. Health Maintenance   Topic Date Due    Shingles Vaccine (1 of 2 - 2 Dose Series) 03/28/1999    Diabetic foot exam  06/19/2016    Diabetic retinal exam  06/19/2016    Flu vaccine (1) 09/01/2017    Pneumococcal low/med risk (2 of 2 - PPSV23) 10/07/2017    A1C test (Diabetic or Prediabetic)  02/13/2018    Colon Cancer Screen FIT/FOBT  03/15/2018    Breast cancer screen  10/29/2018    Diabetic microalbuminuria test  11/13/2018    Lipid screen  11/13/2018    Potassium monitoring  11/13/2018    Creatinine monitoring  11/13/2018    DTaP/Tdap/Td vaccine (2 - Td) 02/01/2023    DEXA (modify frequency per FRAX score)  Completed    Hepatitis C screen  Completed             (applicable per patient's age: Cancer Screenings, Depression Screening, Fall Risk Screening, Immunizations)    Hemoglobin A1C (%)   Date Value   11/13/2017 13.3   03/07/2017 14.9 (H)   10/07/2016 6.9     Microalb/Crt. Ratio (mcg/mg creat)   Date Value   11/13/2017 117 (H)     LDL Cholesterol (mg/dL)   Date Value   11/13/2017 123     AST (U/L)   Date Value   11/13/2017 21     ALT (U/L)   Date Value   11/13/2017 18     BUN (mg/dL)   Date Value   11/13/2017 9      (goal A1C is < 7)   (goal LDL is <100) need 30-50% reduction from baseline     BP Readings from Last 3 Encounters:   01/02/18 138/78   11/13/17 (!) 145/81   03/07/17 133/73    (goal /80)      All Future Testing planned in [facility]:  Lab Frequency Next Occurrence   Echocardiogram complete Once 06/07/2017       Next Visit Date:  No future appointments.          Patient Active Problem List:     Fibroid     Lung nodule     Leiomyomatosis     Restrictive lung disease     Radiculopathy, cervical     Renal mass     DR (diabetic retinopathy) (HCC)     HTN (hypertension)     DM (diabetes mellitus) ([address]

## 2018-02-22 RX ORDER — PANTOPRAZOLE SODIUM 40 MG/1
TABLET, DELAYED RELEASE ORAL
Qty: 30 TABLET | Refills: 0 | Status: SHIPPED | OUTPATIENT
Start: 2018-02-22 | End: 2018-03-19 | Stop reason: SDUPTHER

## 2018-02-22 RX ORDER — LISINOPRIL 5 MG/1
5 TABLET ORAL DAILY
Qty: 30 TABLET | Refills: 0 | Status: SHIPPED | OUTPATIENT
Start: 2018-02-22 | End: 2018-03-19 | Stop reason: SDUPTHER

## 2018-02-22 RX ORDER — ISOPROPYL ALCOHOL 0.75 G/1
SWAB TOPICAL
Qty: 100 EACH | Refills: 0 | Status: SHIPPED | OUTPATIENT
Start: 2018-02-22 | End: 2018-03-27 | Stop reason: SDUPTHER

## 2018-02-22 RX ORDER — GABAPENTIN 300 MG/1
CAPSULE ORAL
Qty: 90 CAPSULE | Refills: 0 | Status: SHIPPED | OUTPATIENT
Start: 2018-02-22 | End: 2018-03-27 | Stop reason: SDUPTHER

## 2018-02-22 RX ORDER — MULTIVITAMIN WITH FOLIC ACID 400 MCG
TABLET ORAL
Qty: 30 TABLET | Refills: 0 | Status: SHIPPED | OUTPATIENT
Start: 2018-02-22 | End: 2018-07-03 | Stop reason: SDUPTHER

## 2018-02-22 RX ORDER — LOVASTATIN 20 MG/1
TABLET ORAL
Qty: 30 TABLET | Refills: 0 | Status: SHIPPED | OUTPATIENT
Start: 2018-02-22 | End: 2018-03-19 | Stop reason: SDUPTHER

## 2018-03-01 RX ORDER — MULTIVITAMIN WITH FOLIC ACID 400 MCG
TABLET ORAL
Qty: 30 TABLET | Refills: 0 | Status: SHIPPED | OUTPATIENT
Start: 2018-03-01 | End: 2018-04-20 | Stop reason: SDUPTHER

## 2018-03-01 RX ORDER — INSULIN GLARGINE 100 [IU]/ML
60 INJECTION, SOLUTION SUBCUTANEOUS NIGHTLY
Qty: 10 VIAL | Refills: 0 | Status: SHIPPED | OUTPATIENT
Start: 2018-03-01 | End: 2018-04-24 | Stop reason: SDUPTHER

## 2018-03-19 RX ORDER — LISINOPRIL 5 MG/1
5 TABLET ORAL DAILY
Qty: 30 TABLET | Refills: 0 | Status: SHIPPED | OUTPATIENT
Start: 2018-03-19 | End: 2018-04-20 | Stop reason: SDUPTHER

## 2018-03-19 RX ORDER — LOVASTATIN 20 MG/1
TABLET ORAL
Qty: 30 TABLET | Refills: 0 | Status: SHIPPED | OUTPATIENT
Start: 2018-03-19 | End: 2018-04-20 | Stop reason: SDUPTHER

## 2018-03-19 RX ORDER — INSULIN GLARGINE 100 [IU]/ML
60 INJECTION, SOLUTION SUBCUTANEOUS NIGHTLY
Qty: 10 VIAL | Refills: 0 | Status: SHIPPED | OUTPATIENT
Start: 2018-03-19 | End: 2018-03-27 | Stop reason: SDUPTHER

## 2018-03-19 RX ORDER — PANTOPRAZOLE SODIUM 40 MG/1
TABLET, DELAYED RELEASE ORAL
Qty: 30 TABLET | Refills: 0 | Status: SHIPPED | OUTPATIENT
Start: 2018-03-19 | End: 2018-04-20 | Stop reason: SDUPTHER

## 2018-03-19 NOTE — TELEPHONE ENCOUNTER
escribe request for lantus, lovastatin, lisinopril and protonix, no future appt scheduled  Phone call to patient to schedule no answer and no vm     Health Maintenance   Topic Date Due    Shingles Vaccine (1 of 2 - 2 Dose Series) 03/28/1999    Diabetic foot exam  06/19/2016    Diabetic retinal exam  06/19/2016    Flu vaccine (1) 09/01/2017    Pneumococcal low/med risk (2 of 2 - PPSV23) 10/07/2017    A1C test (Diabetic or Prediabetic)  02/13/2018    Colon Cancer Screen FIT/FOBT  03/15/2018    Breast cancer screen  10/29/2018    Diabetic microalbuminuria test  11/13/2018    Lipid screen  11/13/2018    Potassium monitoring  11/13/2018    Creatinine monitoring  11/13/2018    DTaP/Tdap/Td vaccine (2 - Td) 02/01/2023    DEXA (modify frequency per FRAX score)  Completed    Hepatitis C screen  Completed             (applicable per patient's age: Cancer Screenings, Depression Screening, Fall Risk Screening, Immunizations)    Hemoglobin A1C (%)   Date Value   11/13/2017 13.3   03/07/2017 14.9 (H)   10/07/2016 6.9     Microalb/Crt. Ratio (mcg/mg creat)   Date Value   11/13/2017 117 (H)     LDL Cholesterol (mg/dL)   Date Value   11/13/2017 123     AST (U/L)   Date Value   11/13/2017 21     ALT (U/L)   Date Value   11/13/2017 18     BUN (mg/dL)   Date Value   11/13/2017 9      (goal A1C is < 7)   (goal LDL is <100) need 30-50% reduction from baseline     BP Readings from Last 3 Encounters:   01/02/18 138/78   11/13/17 (!) 145/81   03/07/17 133/73    (goal /80)      All Future Testing planned in [facility]:  Lab Frequency Next Occurrence   Echocardiogram complete Once 06/07/2017       Next Visit Date:  No future appointments.          Patient Active Problem List:     Fibroid     Lung nodule     Leiomyomatosis     Restrictive lung disease     Radiculopathy, cervical     Renal mass     DR (diabetic retinopathy) (HCC)     HTN (hypertension)     DM (diabetes mellitus) ([address]     Dermatomyositis ([address]     OA

## 2018-03-27 RX ORDER — GABAPENTIN 300 MG/1
CAPSULE ORAL
Qty: 90 CAPSULE | Refills: 0 | Status: SHIPPED | OUTPATIENT
Start: 2018-03-27 | End: 2018-04-20 | Stop reason: SDUPTHER

## 2018-03-27 RX ORDER — ISOPROPYL ALCOHOL 0.75 G/1
SWAB TOPICAL
Qty: 100 EACH | Refills: 0 | Status: SHIPPED | OUTPATIENT
Start: 2018-03-27 | End: 2018-04-20 | Stop reason: SDUPTHER

## 2018-03-27 RX ORDER — INSULIN GLARGINE 100 [IU]/ML
60 INJECTION, SOLUTION SUBCUTANEOUS NIGHTLY
Qty: 10 VIAL | Refills: 0 | Status: SHIPPED | OUTPATIENT
Start: 2018-03-27 | End: 2018-05-16

## 2018-03-27 NOTE — TELEPHONE ENCOUNTER
escribe request for alcohol pads, lantus and gabapentin, no future appt scheduled  Left  to schedule    Health Maintenance   Topic Date Due    Shingles Vaccine (1 of 2 - 2 Dose Series) 03/28/1999    Diabetic foot exam  06/19/2016    Diabetic retinal exam  06/19/2016    Flu vaccine (1) 09/01/2017    Pneumococcal low/med risk (2 of 2 - PPSV23) 10/07/2017    A1C test (Diabetic or Prediabetic)  02/13/2018    Colon Cancer Screen FIT/FOBT  03/15/2018    Breast cancer screen  10/29/2018    Diabetic microalbuminuria test  11/13/2018    Lipid screen  11/13/2018    Potassium monitoring  11/13/2018    Creatinine monitoring  11/13/2018    DTaP/Tdap/Td vaccine (2 - Td) 02/01/2023    DEXA (modify frequency per FRAX score)  Completed    Hepatitis C screen  Completed             (applicable per patient's age: Cancer Screenings, Depression Screening, Fall Risk Screening, Immunizations)    Hemoglobin A1C (%)   Date Value   11/13/2017 13.3   03/07/2017 14.9 (H)   10/07/2016 6.9     Microalb/Crt. Ratio (mcg/mg creat)   Date Value   11/13/2017 117 (H)     LDL Cholesterol (mg/dL)   Date Value   11/13/2017 123     AST (U/L)   Date Value   11/13/2017 21     ALT (U/L)   Date Value   11/13/2017 18     BUN (mg/dL)   Date Value   11/13/2017 9      (goal A1C is < 7)   (goal LDL is <100) need 30-50% reduction from baseline     BP Readings from Last 3 Encounters:   01/02/18 138/78   11/13/17 (!) 145/81   03/07/17 133/73    (goal /80)      All Future Testing planned in [facility]:  Lab Frequency Next Occurrence   Echocardiogram complete Once 06/07/2017       Next Visit Date:  No future appointments.          Patient Active Problem List:     Fibroid     Lung nodule     Leiomyomatosis     Restrictive lung disease     Radiculopathy, cervical     Renal mass     DR (diabetic retinopathy) (HCC)     HTN (hypertension)     DM (diabetes mellitus) (HCC)     Dermatomyositis (HCC)     OA (osteoarthritis)     Hyperlipidemia     Anxiety and

## 2018-04-20 RX ORDER — ISOPROPYL ALCOHOL 0.75 G/1
SWAB TOPICAL
Qty: 100 EACH | Refills: 0 | Status: SHIPPED | OUTPATIENT
Start: 2018-04-20 | End: 2018-05-21 | Stop reason: SDUPTHER

## 2018-04-20 RX ORDER — LISINOPRIL 5 MG/1
5 TABLET ORAL DAILY
Qty: 30 TABLET | Refills: 0 | Status: SHIPPED | OUTPATIENT
Start: 2018-04-20 | End: 2018-05-21 | Stop reason: SDUPTHER

## 2018-04-20 RX ORDER — MULTIVITAMIN WITH FOLIC ACID 400 MCG
TABLET ORAL
Qty: 30 TABLET | Refills: 0 | Status: SHIPPED | OUTPATIENT
Start: 2018-04-20 | End: 2018-05-21 | Stop reason: SDUPTHER

## 2018-04-20 RX ORDER — GABAPENTIN 300 MG/1
CAPSULE ORAL
Qty: 90 CAPSULE | Refills: 0 | Status: SHIPPED | OUTPATIENT
Start: 2018-04-20 | End: 2018-05-21 | Stop reason: SDUPTHER

## 2018-04-20 RX ORDER — LOVASTATIN 20 MG/1
TABLET ORAL
Qty: 30 TABLET | Refills: 0 | Status: SHIPPED | OUTPATIENT
Start: 2018-04-20 | End: 2018-05-21 | Stop reason: SDUPTHER

## 2018-04-20 RX ORDER — PANTOPRAZOLE SODIUM 40 MG/1
TABLET, DELAYED RELEASE ORAL
Qty: 30 TABLET | Refills: 0 | Status: SHIPPED | OUTPATIENT
Start: 2018-04-20 | End: 2018-05-21 | Stop reason: SDUPTHER

## 2018-04-24 RX ORDER — INSULIN GLARGINE 100 [IU]/ML
60 INJECTION, SOLUTION SUBCUTANEOUS NIGHTLY
Qty: 10 VIAL | Refills: 0 | Status: SHIPPED | OUTPATIENT
Start: 2018-04-24 | End: 2018-05-11 | Stop reason: SDUPTHER

## 2018-05-13 RX ORDER — INSULIN GLARGINE 100 [IU]/ML
60 INJECTION, SOLUTION SUBCUTANEOUS NIGHTLY
Qty: 10 VIAL | Refills: 0 | Status: SHIPPED | OUTPATIENT
Start: 2018-05-13 | End: 2018-05-16

## 2018-05-15 ENCOUNTER — TELEPHONE (OUTPATIENT)
Dept: INTERNAL MEDICINE | Age: 68
End: 2018-05-15

## 2018-05-15 DIAGNOSIS — Z79.4 TYPE 2 DIABETES MELLITUS WITHOUT COMPLICATION, WITH LONG-TERM CURRENT USE OF INSULIN (HCC): Primary | ICD-10-CM

## 2018-05-15 DIAGNOSIS — E11.9 TYPE 2 DIABETES MELLITUS WITHOUT COMPLICATION, WITH LONG-TERM CURRENT USE OF INSULIN (HCC): Primary | ICD-10-CM

## 2018-05-16 ENCOUNTER — TELEPHONE (OUTPATIENT)
Dept: INTERNAL MEDICINE | Age: 68
End: 2018-05-16

## 2018-05-21 RX ORDER — GABAPENTIN 300 MG/1
CAPSULE ORAL
Qty: 90 CAPSULE | Refills: 0 | Status: SHIPPED | OUTPATIENT
Start: 2018-05-21 | End: 2018-06-15 | Stop reason: SDUPTHER

## 2018-05-21 RX ORDER — LOVASTATIN 20 MG/1
TABLET ORAL
Qty: 30 TABLET | Refills: 0 | Status: SHIPPED | OUTPATIENT
Start: 2018-05-21 | End: 2018-06-15 | Stop reason: SDUPTHER

## 2018-05-21 RX ORDER — LISINOPRIL 5 MG/1
5 TABLET ORAL DAILY
Qty: 30 TABLET | Refills: 0 | Status: SHIPPED | OUTPATIENT
Start: 2018-05-21 | End: 2018-06-15 | Stop reason: SDUPTHER

## 2018-05-21 RX ORDER — ISOPROPYL ALCOHOL 0.75 G/1
SWAB TOPICAL
Qty: 100 EACH | Refills: 0 | Status: SHIPPED | OUTPATIENT
Start: 2018-05-21 | End: 2018-06-15 | Stop reason: SDUPTHER

## 2018-05-21 RX ORDER — PANTOPRAZOLE SODIUM 40 MG/1
TABLET, DELAYED RELEASE ORAL
Qty: 30 TABLET | Refills: 0 | Status: SHIPPED | OUTPATIENT
Start: 2018-05-21 | End: 2018-06-15 | Stop reason: SDUPTHER

## 2018-05-21 RX ORDER — MULTIVITAMIN WITH FOLIC ACID 400 MCG
TABLET ORAL
Qty: 30 TABLET | Refills: 0 | Status: SHIPPED | OUTPATIENT
Start: 2018-05-21 | End: 2018-06-15 | Stop reason: SDUPTHER

## 2018-06-15 RX ORDER — LISINOPRIL 5 MG/1
5 TABLET ORAL DAILY
Qty: 30 TABLET | Refills: 0 | Status: SHIPPED | OUTPATIENT
Start: 2018-06-15 | End: 2018-07-16 | Stop reason: SDUPTHER

## 2018-06-15 RX ORDER — PANTOPRAZOLE SODIUM 40 MG/1
TABLET, DELAYED RELEASE ORAL
Qty: 30 TABLET | Refills: 0 | Status: SHIPPED | OUTPATIENT
Start: 2018-06-15 | End: 2018-07-16 | Stop reason: SDUPTHER

## 2018-06-15 RX ORDER — GABAPENTIN 300 MG/1
CAPSULE ORAL
Qty: 90 CAPSULE | Refills: 0 | Status: SHIPPED | OUTPATIENT
Start: 2018-06-15 | End: 2018-07-16 | Stop reason: SDUPTHER

## 2018-06-15 RX ORDER — MULTIVITAMIN WITH FOLIC ACID 400 MCG
TABLET ORAL
Qty: 30 TABLET | Refills: 0 | Status: SHIPPED | OUTPATIENT
Start: 2018-06-15 | End: 2018-07-16 | Stop reason: SDUPTHER

## 2018-06-15 RX ORDER — ISOPROPYL ALCOHOL 0.75 G/1
SWAB TOPICAL
Qty: 100 EACH | Refills: 0 | Status: SHIPPED | OUTPATIENT
Start: 2018-06-15 | End: 2018-07-16 | Stop reason: SDUPTHER

## 2018-06-15 RX ORDER — LOVASTATIN 20 MG/1
TABLET ORAL
Qty: 30 TABLET | Refills: 0 | Status: SHIPPED | OUTPATIENT
Start: 2018-06-15 | End: 2018-07-16 | Stop reason: SDUPTHER

## 2018-06-18 RX ORDER — INSULIN GLARGINE 100 [IU]/ML
60 INJECTION, SOLUTION SUBCUTANEOUS NIGHTLY
Qty: 20 VIAL | Refills: 0 | Status: SHIPPED | OUTPATIENT
Start: 2018-06-18 | End: 2018-07-02 | Stop reason: SDUPTHER

## 2018-07-02 ENCOUNTER — TELEPHONE (OUTPATIENT)
Dept: INTERNAL MEDICINE | Age: 68
End: 2018-07-02

## 2018-07-02 ENCOUNTER — PATIENT MESSAGE (OUTPATIENT)
Dept: INTERNAL MEDICINE | Age: 68
End: 2018-07-02

## 2018-07-02 RX ORDER — INSULIN GLARGINE 100 [IU]/ML
60 INJECTION, SOLUTION SUBCUTANEOUS NIGHTLY
Qty: 2 VIAL | Refills: 0 | Status: SHIPPED | OUTPATIENT
Start: 2018-07-02 | End: 2018-09-07 | Stop reason: SDUPTHER

## 2018-07-02 RX ORDER — INSULIN GLARGINE 100 [IU]/ML
60 INJECTION, SOLUTION SUBCUTANEOUS NIGHTLY
Qty: 20 VIAL | Refills: 0 | Status: SHIPPED | OUTPATIENT
Start: 2018-07-02 | End: 2018-07-02 | Stop reason: SDUPTHER

## 2018-07-02 RX ORDER — TRAMADOL HYDROCHLORIDE 50 MG/1
50 TABLET ORAL 2 TIMES DAILY PRN
Qty: 30 TABLET | Refills: 0 | OUTPATIENT
Start: 2018-07-02

## 2018-07-02 NOTE — TELEPHONE ENCOUNTER
From: Anna Marie Villanueva  To: Alannah Monterroso MD  Sent: 7/2/2018 3:08 PM EDT  Subject: Prescription Question    Can I get a refill of tramadol for my pain last time I got some was in 2016 I need more I only take when the pain is really bad. Thank you.

## 2018-07-02 NOTE — TELEPHONE ENCOUNTER
Received call from patient requesting an appointment. States she has been out of town for a while and has not been able to come in. Scheduled for 08/02/18 with Dr. Raymon Parsons. Patient also states she has been out of insulin for a week now. States she checked it yesterday and it was 250. Explained to patient that Dr. Raymon Parsons has refilled her Lantus and it was sent to [facility]. Patient agreeable to checking a FBS daily and will call in 1 week to report readings.

## 2018-07-03 RX ORDER — SYRINGE-NEEDLE,INSULIN,0.5 ML 27GX1/2"
1 SYRINGE, EMPTY DISPOSABLE MISCELLANEOUS 4 TIMES DAILY
Qty: 100 EACH | Refills: 3 | Status: SHIPPED | OUTPATIENT
Start: 2018-07-03 | End: 2019-04-01 | Stop reason: SDUPTHER

## 2018-07-03 NOTE — TELEPHONE ENCOUNTER
From: Anna Marie Villanueva  To: Radha Bonilla MD  Sent: 7/2/2018 5:51 PM EDT  Subject: Prescription Question    I will see you on 8/2/2018 at 1:30. Thanks.  ----- Message -----  From: Radha Bonilla MD  Sent: 7/2/2018 3:47 PM EDT  To: Anna Marie Villanueva  Subject: RE: Prescription Question  Since tramadol is a controlled substance, we would have to discuss it's prescription at the time of your next visit     ----- Message -----   From: Anna Marie Villanueva   Sent: 7/2/2018 3:08 PM EDT   To: Radha Bonilla MD  Subject: Prescription Question    Can I get a refill of tramadol for my pain last time I got some was in 2016 I need more I only take when the pain is really bad. Thank you.

## 2018-07-16 ENCOUNTER — TELEPHONE (OUTPATIENT)
Dept: INTERNAL MEDICINE | Age: 68
End: 2018-07-16

## 2018-07-16 RX ORDER — MULTIVITAMIN
TABLET ORAL
Qty: 30 TABLET | Refills: 0 | Status: SHIPPED | OUTPATIENT
Start: 2018-07-16 | End: 2018-08-15 | Stop reason: SDUPTHER

## 2018-07-16 RX ORDER — APIXABAN 5 MG/1
5 TABLET, FILM COATED ORAL 2 TIMES DAILY
Qty: 60 TABLET | Refills: 0 | Status: SHIPPED | OUTPATIENT
Start: 2018-07-16 | End: 2018-08-15 | Stop reason: SDUPTHER

## 2018-07-16 RX ORDER — GABAPENTIN 300 MG/1
CAPSULE ORAL
Qty: 90 CAPSULE | Refills: 0 | Status: SHIPPED | OUTPATIENT
Start: 2018-07-16 | End: 2018-08-15 | Stop reason: SDUPTHER

## 2018-07-16 RX ORDER — PANTOPRAZOLE SODIUM 40 MG/1
TABLET, DELAYED RELEASE ORAL
Qty: 30 TABLET | Refills: 0 | Status: SHIPPED | OUTPATIENT
Start: 2018-07-16 | End: 2018-08-15 | Stop reason: SDUPTHER

## 2018-07-16 RX ORDER — LOVASTATIN 20 MG/1
TABLET ORAL
Qty: 30 TABLET | Refills: 0 | Status: SHIPPED | OUTPATIENT
Start: 2018-07-16 | End: 2018-08-15 | Stop reason: SDUPTHER

## 2018-07-16 RX ORDER — ASPIRIN 81 MG/1
81 TABLET ORAL DAILY
Qty: 30 TABLET | Refills: 0 | Status: SHIPPED | OUTPATIENT
Start: 2018-07-16 | End: 2018-08-15 | Stop reason: SDUPTHER

## 2018-07-16 RX ORDER — PEN NEEDLE, DIABETIC 32GX 5/32"
NEEDLE, DISPOSABLE MISCELLANEOUS
Qty: 100 EACH | Refills: 0 | Status: SHIPPED | OUTPATIENT
Start: 2018-07-16 | End: 2018-08-15 | Stop reason: SDUPTHER

## 2018-07-16 RX ORDER — LISINOPRIL 5 MG/1
5 TABLET ORAL DAILY
Qty: 30 TABLET | Refills: 0 | Status: SHIPPED | OUTPATIENT
Start: 2018-07-16 | End: 2018-08-15 | Stop reason: SDUPTHER

## 2018-07-16 NOTE — TELEPHONE ENCOUNTER
PC to patient to obtain blood sugar readings. Patient does not have her glucometer right now, she will return call when she is able to get to it. Rescheduled her 08/02/18 appointment that was cancelled per Dr. Isidoro Flynn to 08/07/18.

## 2018-07-17 NOTE — TELEPHONE ENCOUNTER
Sugars are really high  We should increase her lantus to 70 units but she will also need meal coverage  Also, she has not been in the office since jan 2018  She really needs to come in for an appointment to discuss pre-meal insulin

## 2018-07-17 NOTE — TELEPHONE ENCOUNTER
Patient calling with today's blood sugars.  and 2 hr post lunch was 460. Instructed patient to increase Lantus to 70 units nightly. She is scheduled 08/07/18 for follow up. Patient will call and report blood sugars in 1 week but instructed to call sooner if they go above 500 again.

## 2018-07-26 ENCOUNTER — TELEPHONE (OUTPATIENT)
Dept: INTERNAL MEDICINE | Age: 68
End: 2018-07-26

## 2018-08-15 RX ORDER — APIXABAN 5 MG/1
5 TABLET, FILM COATED ORAL 2 TIMES DAILY
Qty: 60 TABLET | Refills: 0 | Status: SHIPPED | OUTPATIENT
Start: 2018-08-15 | End: 2018-10-03 | Stop reason: SDUPTHER

## 2018-08-15 RX ORDER — MULTIVITAMIN WITH FOLIC ACID 400 MCG
TABLET ORAL
Qty: 30 TABLET | Refills: 0 | Status: SHIPPED | OUTPATIENT
Start: 2018-08-15 | End: 2018-12-13 | Stop reason: SDUPTHER

## 2018-08-15 RX ORDER — LOVASTATIN 20 MG/1
TABLET ORAL
Qty: 30 TABLET | Refills: 0 | Status: SHIPPED | OUTPATIENT
Start: 2018-08-15 | End: 2018-10-03 | Stop reason: SDUPTHER

## 2018-08-15 RX ORDER — ASPIRIN 81 MG/1
81 TABLET ORAL DAILY
Qty: 30 TABLET | Refills: 0 | Status: SHIPPED | OUTPATIENT
Start: 2018-08-15 | End: 2018-10-16 | Stop reason: SDUPTHER

## 2018-08-15 RX ORDER — LISINOPRIL 5 MG/1
5 TABLET ORAL DAILY
Qty: 30 TABLET | Refills: 0 | Status: SHIPPED | OUTPATIENT
Start: 2018-08-15 | End: 2018-10-03 | Stop reason: SDUPTHER

## 2018-08-15 RX ORDER — PEN NEEDLE, DIABETIC 32GX 5/32"
NEEDLE, DISPOSABLE MISCELLANEOUS
Qty: 100 EACH | Refills: 0 | Status: SHIPPED | OUTPATIENT
Start: 2018-08-15 | End: 2018-10-03 | Stop reason: SDUPTHER

## 2018-08-15 RX ORDER — PANTOPRAZOLE SODIUM 40 MG/1
TABLET, DELAYED RELEASE ORAL
Qty: 30 TABLET | Refills: 0 | Status: SHIPPED | OUTPATIENT
Start: 2018-08-15 | End: 2018-10-03 | Stop reason: SDUPTHER

## 2018-08-15 RX ORDER — GABAPENTIN 300 MG/1
CAPSULE ORAL
Qty: 90 CAPSULE | Refills: 0 | Status: SHIPPED | OUTPATIENT
Start: 2018-08-15 | End: 2018-10-16 | Stop reason: SDUPTHER

## 2018-08-17 ENCOUNTER — PATIENT MESSAGE (OUTPATIENT)
Dept: INTERNAL MEDICINE | Age: 68
End: 2018-08-17

## 2018-08-17 NOTE — TELEPHONE ENCOUNTER
mariely request for LANCETS ONE TOUCH DELICA 90V MISC future appointment scheduled. Health Maintenance   Topic Date Due    Shingles Vaccine (1 of 2 - 2 Dose Series) 03/28/1999    Diabetic foot exam  06/19/2016    Diabetic retinal exam  06/19/2016    Pneumococcal low/med risk (2 of 2 - PPSV23) 10/07/2017    A1C test (Diabetic or Prediabetic)  02/13/2018    Colon Cancer Screen FIT/FOBT  03/15/2018    Flu vaccine (1) 09/01/2018    Breast cancer screen  10/29/2018    Diabetic microalbuminuria test  11/13/2018    Lipid screen  11/13/2018    Potassium monitoring  11/13/2018    Creatinine monitoring  11/13/2018    DTaP/Tdap/Td vaccine (2 - Td) 02/01/2023    DEXA (modify frequency per FRAX score)  Completed    Hepatitis C screen  Completed             (applicable per patient's age: Cancer Screenings, Depression Screening, Fall Risk Screening, Immunizations)    Hemoglobin A1C (%)   Date Value   11/13/2017 13.3   03/07/2017 14.9 (H)   10/07/2016 6.9     Microalb/Crt.  Ratio (mcg/mg creat)   Date Value   11/13/2017 117 (H)     LDL Cholesterol (mg/dL)   Date Value   11/13/2017 123     AST (U/L)   Date Value   11/13/2017 21     ALT (U/L)   Date Value   11/13/2017 18     BUN (mg/dL)   Date Value   11/13/2017 9      (goal A1C is < 7)   (goal LDL is <100) need 30-50% reduction from baseline     BP Readings from Last 3 Encounters:   01/02/18 138/78   11/13/17 (!) 145/81   03/07/17 133/73    (goal /80)      All Future Testing planned in [facility]:  Lab Frequency Next Occurrence       Next Visit Date:  Future Appointments  Date Time Provider Sigrid Begum   10/16/2018 1:30 PM Davy Ovalle MD [facility] [address]            Patient Active Problem List:     Fibroid     Lung nodule     Leiomyomatosis     Restrictive lung disease     Radiculopathy, cervical     Renal mass     DR (diabetic retinopathy) ([address]     HTN (hypertension)     DM (diabetes mellitus) ([address]     Dermatomyositis ([facility] [address]     OA (osteoarthritis)

## 2018-09-06 ENCOUNTER — TELEPHONE (OUTPATIENT)
Dept: INTERNAL MEDICINE | Age: 68
End: 2018-09-06

## 2018-09-06 DIAGNOSIS — E11.9 TYPE 2 DIABETES MELLITUS WITHOUT COMPLICATION, WITH LONG-TERM CURRENT USE OF INSULIN (HCC): Primary | ICD-10-CM

## 2018-09-06 DIAGNOSIS — Z79.4 TYPE 2 DIABETES MELLITUS WITHOUT COMPLICATION, WITH LONG-TERM CURRENT USE OF INSULIN (HCC): Primary | ICD-10-CM

## 2018-09-07 RX ORDER — INSULIN GLARGINE 100 [IU]/ML
75 INJECTION, SOLUTION SUBCUTANEOUS 2 TIMES DAILY
Qty: 5 VIAL | Refills: 1 | Status: SHIPPED | OUTPATIENT
Start: 2018-09-07 | End: 2018-10-03 | Stop reason: SDUPTHER

## 2018-09-11 ENCOUNTER — HOSPITAL ENCOUNTER (OUTPATIENT)
Age: 68
Setting detail: SPECIMEN
Discharge: HOME | End: 2018-09-11
Payer: MEDICAID

## 2018-09-11 ENCOUNTER — OFFICE VISIT (OUTPATIENT)
Dept: INTERNAL MEDICINE | Age: 68
End: 2018-09-11
Payer: MEDICAID

## 2018-09-11 VITALS
DIASTOLIC BLOOD PRESSURE: 64 MMHG | BODY MASS INDEX: 32.55 KG/M2 | HEART RATE: 107 BPM | HEIGHT: 65 IN | WEIGHT: 195.4 LBS | SYSTOLIC BLOOD PRESSURE: 123 MMHG

## 2018-09-11 DIAGNOSIS — D50.0 IRON DEFICIENCY ANEMIA DUE TO CHRONIC BLOOD LOSS: ICD-10-CM

## 2018-09-11 DIAGNOSIS — Z79.4 TYPE 2 DIABETES MELLITUS WITHOUT COMPLICATION, WITH LONG-TERM CURRENT USE OF INSULIN (HCC): Primary | ICD-10-CM

## 2018-09-11 DIAGNOSIS — E11.9 TYPE 2 DIABETES MELLITUS WITHOUT COMPLICATION, WITH LONG-TERM CURRENT USE OF INSULIN (HCC): Primary | ICD-10-CM

## 2018-09-11 DIAGNOSIS — M54.12 RADICULOPATHY, CERVICAL: ICD-10-CM

## 2018-09-11 DIAGNOSIS — R19.00 PELVIC MASS: ICD-10-CM

## 2018-09-11 DIAGNOSIS — Z23 NEED FOR PNEUMOCOCCAL VACCINE: ICD-10-CM

## 2018-09-11 DIAGNOSIS — Z23 NEED FOR PROPHYLACTIC VACCINATION AND INOCULATION AGAINST VARICELLA: ICD-10-CM

## 2018-09-11 LAB
ABSOLUTE EOS #: 0 K/UL (ref 0–0.4)
ABSOLUTE IMMATURE GRANULOCYTE: 0 K/UL (ref 0–0.3)
ABSOLUTE LYMPH #: 1.74 K/UL (ref 1–4.8)
ABSOLUTE MONO #: 0.81 K/UL (ref 0.1–0.8)
BASOPHILS # BLD: 2 % (ref 0–2)
BASOPHILS ABSOLUTE: 0.23 K/UL (ref 0–0.2)
DIFFERENTIAL TYPE: ABNORMAL
EOSINOPHILS RELATIVE PERCENT: 0 % (ref 1–4)
HBA1C MFR BLD: 7.1 %
HCT VFR BLD CALC: 42.5 % (ref 36.3–47.1)
HEMOGLOBIN: 12.1 G/DL (ref 11.9–15.1)
IMMATURE GRANULOCYTES: 0 %
LYMPHOCYTES # BLD: 15 % (ref 24–44)
MCH RBC QN AUTO: 23.5 PG (ref 25.2–33.5)
MCHC RBC AUTO-ENTMCNC: 28.5 G/DL (ref 28.4–34.8)
MCV RBC AUTO: 82.5 FL (ref 82.6–102.9)
MONOCYTES # BLD: 7 % (ref 1–7)
MORPHOLOGY: ABNORMAL
NRBC AUTOMATED: 0 PER 100 WBC
PDW BLD-RTO: 28.4 % (ref 11.8–14.4)
PLATELET # BLD: ABNORMAL K/UL (ref 138–453)
PLATELET ESTIMATE: ABNORMAL
PLATELET, FLUORESCENCE: 307 K/UL (ref 138–453)
PLATELET, IMMATURE FRACTION: 5.3 % (ref 1.1–10.3)
PMV BLD AUTO: ABNORMAL FL (ref 8.1–13.5)
RBC # BLD: 5.15 M/UL (ref 3.95–5.11)
RBC # BLD: ABNORMAL 10*6/UL
SEG NEUTROPHILS: 76 % (ref 36–66)
SEGMENTED NEUTROPHILS ABSOLUTE COUNT: 8.82 K/UL (ref 1.8–7.7)
WBC # BLD: 11.6 K/UL (ref 3.5–11.3)
WBC # BLD: ABNORMAL 10*3/UL

## 2018-09-11 PROCEDURE — 1101F PT FALLS ASSESS-DOCD LE1/YR: CPT | Performed by: INTERNAL MEDICINE

## 2018-09-11 PROCEDURE — 99214 OFFICE O/P EST MOD 30 MIN: CPT | Performed by: INTERNAL MEDICINE

## 2018-09-11 PROCEDURE — 1090F PRES/ABSN URINE INCON ASSESS: CPT | Performed by: INTERNAL MEDICINE

## 2018-09-11 PROCEDURE — 85055 RETICULATED PLATELET ASSAY: CPT

## 2018-09-11 PROCEDURE — 1036F TOBACCO NON-USER: CPT | Performed by: INTERNAL MEDICINE

## 2018-09-11 PROCEDURE — 1123F ACP DISCUSS/DSCN MKR DOCD: CPT | Performed by: INTERNAL MEDICINE

## 2018-09-11 PROCEDURE — 36415 COLL VENOUS BLD VENIPUNCTURE: CPT

## 2018-09-11 PROCEDURE — 3017F COLORECTAL CA SCREEN DOC REV: CPT | Performed by: INTERNAL MEDICINE

## 2018-09-11 PROCEDURE — 85025 COMPLETE CBC W/AUTO DIFF WBC: CPT

## 2018-09-11 PROCEDURE — 83036 HEMOGLOBIN GLYCOSYLATED A1C: CPT | Performed by: INTERNAL MEDICINE

## 2018-09-11 PROCEDURE — 3045F PR MOST RECENT HEMOGLOBIN A1C LEVEL 7.0-9.0%: CPT | Performed by: INTERNAL MEDICINE

## 2018-09-11 PROCEDURE — G8427 DOCREV CUR MEDS BY ELIG CLIN: HCPCS | Performed by: INTERNAL MEDICINE

## 2018-09-11 PROCEDURE — G8399 PT W/DXA RESULTS DOCUMENT: HCPCS | Performed by: INTERNAL MEDICINE

## 2018-09-11 PROCEDURE — 99211 OFF/OP EST MAY X REQ PHY/QHP: CPT | Performed by: INTERNAL MEDICINE

## 2018-09-11 PROCEDURE — G8417 CALC BMI ABV UP PARAM F/U: HCPCS | Performed by: INTERNAL MEDICINE

## 2018-09-11 PROCEDURE — 2022F DILAT RTA XM EVC RTNOPTHY: CPT | Performed by: INTERNAL MEDICINE

## 2018-09-11 PROCEDURE — 4040F PNEUMOC VAC/ADMIN/RCVD: CPT | Performed by: INTERNAL MEDICINE

## 2018-09-11 PROCEDURE — G0009 ADMIN PNEUMOCOCCAL VACCINE: HCPCS | Performed by: INTERNAL MEDICINE

## 2018-09-11 RX ORDER — FERROUS SULFATE 325(65) MG
325 TABLET ORAL
Qty: 30 TABLET | Refills: 3 | Status: SHIPPED | OUTPATIENT
Start: 2018-09-11 | End: 2018-10-03 | Stop reason: SDUPTHER

## 2018-09-11 RX ORDER — ANTIOX #8/OM3/DHA/EPA/LUT/ZEAX 250-2.5 MG
2 CAPSULE ORAL DAILY
Qty: 60 CAPSULE | Refills: 3 | Status: SHIPPED | OUTPATIENT
Start: 2018-09-11 | End: 2018-10-03 | Stop reason: SDUPTHER

## 2018-09-11 RX ORDER — TRAMADOL HYDROCHLORIDE 50 MG/1
50 TABLET ORAL DAILY PRN
Qty: 30 TABLET | Refills: 0 | Status: SHIPPED | OUTPATIENT
Start: 2018-09-11 | End: 2018-10-11

## 2018-09-11 NOTE — PROGRESS NOTES
Subjective:      Patient ID: Anna Marie Nick Client is a 70 y.o. female. HPI Pt here to follow up on  Recent admission to [address] [place] [address] for acute blood loss anemia secondary to GI bleed, received total of 4 units of blood transfusion  Patient underwent colonoscopy that revealed diverticulosis in the colon. Repeat colonoscopy in one year has been recommended. Patient has history of atrial flutter and she was on aspirin and Eliquis  Once bleeding stopped and colonoscopy did not reveal any active bleeding these 2 medications were resolved on discharge  Since discharge, patient has not noticed any more bleeding but continues to feel tired. She is taking iron supplements. Patient has history of uncontrolled diabetes-she is currently taking Lantus 75 units twice a day  Hemoglobin A1c today is 7.1    Patient has history of chronic back pain, chronic neck pain and osteoarthritis of multiple joints  She uses a cane for ambulation  She cannot take NSAIDs because of history of GI bleed  In the past I have prescribed tramadol for her intermittently, and 15 tablets last her 2-3 months because she only takes it on worse days, she is today requesting for a refill. Patient has history of a pelvic mass for which she was referred to gynecology oncology, but she could not see them. The size of the mass appears to be stable based on recent imaging. She needs Pneumovax and prescription for shingles vaccine    Review of Systems  Negative for fever  HENT: Negative for nosebleeds. Respiratory: Negative for cough, shortness of breath, wheezing and stridor. Cardiovascular: Negative for chest pain, palpitations and leg swelling. Gastrointestinal: Negative for nausea, vomiting, abdominal pain, diarrhea and constipation. Genitourinary: Negative for hematuria. Objective:   Physical Exam  Constitutional: She is oriented to person, place, and time. She appears well-developed. No distress.

## 2018-09-11 NOTE — PROGRESS NOTES
Visit Information    Have you changed or started any medications since your last visit including any over-the-counter medicines, vitamins, or herbal medicines? yes -  Ferrous sulfate  Are you having any side effects from any of your medications? -  no  Have you stopped taking any of your medications? Is so, why? -  no    Have you seen any other physician or provider since your last visit? No  Have you had any other diagnostic tests since your last visit? No  Have you been seen in the emergency room and/or had an admission to a hospital since we last saw you? Yes - Records Requested  Have you had your routine dental cleaning in the past 6 months? no    Have you activated your [employer] account? If not, what are your barriers?  Yes     Patient Care Team:  Russ Cagle MD as PCP - General (Internal Medicine)  Russ Cagle MD as PCP - [facility] Attributed Provider    Medical History Review  Past Medical, Family, and Social History reviewed and does contribute to the patient presenting condition    Health Maintenance   Topic Date Due    Shingles Vaccine (1 of 2 - 2 Dose Series) 03/28/1999    Diabetic foot exam  06/19/2016    Diabetic retinal exam  06/19/2016    Pneumococcal low/med risk (2 of 2 - PPSV23) 10/07/2017    A1C test (Diabetic or Prediabetic)  02/13/2018    Flu vaccine (1) 09/01/2018    Breast cancer screen  10/29/2018    Diabetic microalbuminuria test  11/13/2018    Lipid screen  11/13/2018    Potassium monitoring  11/13/2018    Creatinine monitoring  11/13/2018    Colon cancer screen colonoscopy  08/08/2019    DTaP/Tdap/Td vaccine (2 - Td) 02/01/2023    DEXA (modify frequency per FRAX score)  Completed    Hepatitis C screen  Completed

## 2018-10-03 DIAGNOSIS — E11.9 TYPE 2 DIABETES MELLITUS WITHOUT COMPLICATION, WITH LONG-TERM CURRENT USE OF INSULIN (HCC): ICD-10-CM

## 2018-10-03 DIAGNOSIS — Z79.4 TYPE 2 DIABETES MELLITUS WITHOUT COMPLICATION, WITH LONG-TERM CURRENT USE OF INSULIN (HCC): ICD-10-CM

## 2018-10-03 RX ORDER — ANTIOX #8/OM3/DHA/EPA/LUT/ZEAX 250-2.5 MG
2 CAPSULE ORAL DAILY
Qty: 60 CAPSULE | Refills: 3 | Status: SHIPPED | OUTPATIENT
Start: 2018-10-03 | End: 2019-01-28 | Stop reason: SDUPTHER

## 2018-10-03 RX ORDER — LISINOPRIL 5 MG/1
5 TABLET ORAL DAILY
Qty: 30 TABLET | Refills: 2 | Status: SHIPPED | OUTPATIENT
Start: 2018-10-03 | End: 2018-12-03 | Stop reason: SDUPTHER

## 2018-10-03 RX ORDER — LOVASTATIN 20 MG/1
20 TABLET ORAL NIGHTLY
Qty: 30 TABLET | Refills: 2 | Status: SHIPPED | OUTPATIENT
Start: 2018-10-03 | End: 2018-12-03 | Stop reason: SDUPTHER

## 2018-10-03 RX ORDER — FERROUS SULFATE 325(65) MG
325 TABLET ORAL
Qty: 30 TABLET | Refills: 3 | Status: SHIPPED | OUTPATIENT
Start: 2018-10-03 | End: 2019-01-28 | Stop reason: SDUPTHER

## 2018-10-03 RX ORDER — INSULIN GLARGINE 100 [IU]/ML
75 INJECTION, SOLUTION SUBCUTANEOUS 2 TIMES DAILY
Qty: 5 VIAL | Refills: 1 | Status: SHIPPED | OUTPATIENT
Start: 2018-10-03 | End: 2019-02-13 | Stop reason: SDUPTHER

## 2018-10-03 RX ORDER — PEN NEEDLE, DIABETIC 32GX 5/32"
1 NEEDLE, DISPOSABLE MISCELLANEOUS 3 TIMES DAILY
Qty: 100 EACH | Refills: 2 | Status: SHIPPED | OUTPATIENT
Start: 2018-10-03 | End: 2019-01-02 | Stop reason: SDUPTHER

## 2018-10-03 RX ORDER — PANTOPRAZOLE SODIUM 40 MG/1
40 TABLET, DELAYED RELEASE ORAL DAILY
Qty: 30 TABLET | Refills: 2 | Status: SHIPPED | OUTPATIENT
Start: 2018-10-03 | End: 2018-12-03 | Stop reason: SDUPTHER

## 2018-10-17 RX ORDER — ASPIRIN 81 MG/1
81 TABLET ORAL DAILY
Qty: 30 TABLET | Refills: 0 | Status: SHIPPED | OUTPATIENT
Start: 2018-10-17 | End: 2018-11-19 | Stop reason: SDUPTHER

## 2018-10-17 RX ORDER — GABAPENTIN 300 MG/1
CAPSULE ORAL
Qty: 90 CAPSULE | Refills: 0 | Status: SHIPPED | OUTPATIENT
Start: 2018-10-17 | End: 2018-11-19 | Stop reason: SDUPTHER

## 2018-11-19 RX ORDER — GABAPENTIN 300 MG/1
CAPSULE ORAL
Qty: 90 CAPSULE | Refills: 0 | Status: SHIPPED | OUTPATIENT
Start: 2018-11-19 | End: 2018-12-20 | Stop reason: SDUPTHER

## 2018-11-19 RX ORDER — ASPIRIN 81 MG/1
81 TABLET ORAL DAILY
Qty: 30 TABLET | Refills: 0 | Status: SHIPPED | OUTPATIENT
Start: 2018-11-19 | End: 2018-12-20 | Stop reason: SDUPTHER

## 2018-11-19 NOTE — TELEPHONE ENCOUNTER
Recommendations from today's MTM visit:                                                    MTM (medication therapy management) is a service provided by a clinical pharmacist designed to help you get the most of out of your medicines.   Today we reviewed what your medicines are for, how to know if they are working, that your medicines are safe and how to make your medicine regimen as easy as possible.     1. Contact the [place] Prescription Assistance Program/Fund (Surekha Davidson and Katelynwoo Cronin) at [phone].  They can help to see if you qualify for any medication assistance programs.    2. Call the [facility] Line at [phone].  Magen Butler, PharmD, [facility]  escribe request for aspirin, gabapentin future appointment scheduled. Health Maintenance   Topic Date Due    Shingles Vaccine (1 of 2 - 2 Dose Series) 03/28/1999    Diabetic retinal exam  06/19/2016    Flu vaccine (1) 09/01/2018    Diabetic microalbuminuria test  11/13/2018    Potassium monitoring  11/13/2018    Creatinine monitoring  11/13/2018    Breast cancer screen  10/29/2018    Lipid screen  11/13/2018    Colon cancer screen colonoscopy  08/08/2019    Diabetic foot exam  09/11/2019    A1C test (Diabetic or Prediabetic)  09/11/2019    DTaP/Tdap/Td vaccine (2 - Td) 02/01/2023    DEXA (modify frequency per FRAX score)  Completed    Pneumococcal low/med risk  Completed    Hepatitis C screen  Completed             (applicable per patient's age: Cancer Screenings, Depression Screening, Fall Risk Screening, Immunizations)    Hemoglobin A1C (%)   Date Value   09/11/2018 7.1   11/13/2017 13.3   03/07/2017 14.9 (H)     Microalb/Crt.  Ratio (mcg/mg creat)   Date Value   11/13/2017 117 (H)     LDL Cholesterol (mg/dL)   Date Value   11/13/2017 123     AST (U/L)   Date Value   11/13/2017 21     ALT (U/L)   Date Value   11/13/2017 18     BUN (mg/dL)   Date Value   11/13/2017 9      (goal A1C is < 7)   (goal LDL is <100) need 30-50% reduction from baseline     BP Readings from Last 3 Encounters:   09/11/18 123/64   01/02/18 138/78   11/13/17 (!) 145/81    (goal /80)      All Future Testing planned in [facility]:  Lab Frequency Next Occurrence       Next Visit Date:  Future Appointments  Date Time Provider Sigrid Begum   12/3/2018 2:00 PM Amy Medel MD [facility] IM [address]            Patient Active Problem List:     Fibroid     Lung nodule     Leiomyomatosis     Restrictive lung disease     Radiculopathy, cervical     Renal mass     DR (diabetic retinopathy) ([facility] [address]     HTN (hypertension)     DM (diabetes mellitus) ([facility] [address]     Dermatomyositis (HCC)     OA (osteoarthritis)     Hyperlipidemia     Anxiety

## 2018-12-03 RX ORDER — LISINOPRIL 5 MG/1
5 TABLET ORAL DAILY
Qty: 30 TABLET | Refills: 0 | Status: SHIPPED | OUTPATIENT
Start: 2018-12-03 | End: 2019-04-22 | Stop reason: SDUPTHER

## 2018-12-03 RX ORDER — APIXABAN 5 MG/1
5 TABLET, FILM COATED ORAL 2 TIMES DAILY
Qty: 60 TABLET | Refills: 0 | Status: SHIPPED | OUTPATIENT
Start: 2018-12-03 | End: 2019-04-22 | Stop reason: SDUPTHER

## 2018-12-03 RX ORDER — LOVASTATIN 20 MG/1
20 TABLET ORAL NIGHTLY
Qty: 30 TABLET | Refills: 0 | Status: SHIPPED | OUTPATIENT
Start: 2018-12-03 | End: 2019-04-22 | Stop reason: SDUPTHER

## 2018-12-03 RX ORDER — PANTOPRAZOLE SODIUM 40 MG/1
40 TABLET, DELAYED RELEASE ORAL DAILY
Qty: 30 TABLET | Refills: 0 | Status: SHIPPED | OUTPATIENT
Start: 2018-12-03 | End: 2019-04-22 | Stop reason: SDUPTHER

## 2018-12-13 ENCOUNTER — HOSPITAL ENCOUNTER (OUTPATIENT)
Age: 68
Setting detail: SPECIMEN
Discharge: HOME | End: 2018-12-13
Payer: MEDICAID

## 2018-12-13 ENCOUNTER — OFFICE VISIT (OUTPATIENT)
Dept: INTERNAL MEDICINE | Age: 68
End: 2018-12-13
Payer: MEDICAID

## 2018-12-13 VITALS
SYSTOLIC BLOOD PRESSURE: 120 MMHG | BODY MASS INDEX: 33.15 KG/M2 | HEART RATE: 91 BPM | WEIGHT: 199.2 LBS | DIASTOLIC BLOOD PRESSURE: 74 MMHG

## 2018-12-13 DIAGNOSIS — Z12.31 ENCOUNTER FOR SCREENING MAMMOGRAM FOR BREAST CANCER: ICD-10-CM

## 2018-12-13 DIAGNOSIS — H93.8X1 MASS OF RIGHT EAR: ICD-10-CM

## 2018-12-13 DIAGNOSIS — Z79.4 TYPE 2 DIABETES MELLITUS WITH PROLIFERATIVE RETINOPATHY OF LEFT EYE, WITH LONG-TERM CURRENT USE OF INSULIN, MACULAR EDEMA PRESENCE UNSPECIFIED, UNSPECIFIED PROLIFERATIVE RETINOPATHY TYPE (HCC): ICD-10-CM

## 2018-12-13 DIAGNOSIS — E11.3592 TYPE 2 DIABETES MELLITUS WITH PROLIFERATIVE RETINOPATHY OF LEFT EYE, WITH LONG-TERM CURRENT USE OF INSULIN, MACULAR EDEMA PRESENCE UNSPECIFIED, UNSPECIFIED PROLIFERATIVE RETINOPATHY TYPE (HCC): ICD-10-CM

## 2018-12-13 DIAGNOSIS — Z23 NEEDS FLU SHOT: Primary | ICD-10-CM

## 2018-12-13 DIAGNOSIS — M47.816 OSTEOARTHRITIS OF LUMBAR SPINE, UNSPECIFIED SPINAL OSTEOARTHRITIS COMPLICATION STATUS: ICD-10-CM

## 2018-12-13 LAB
ABSOLUTE EOS #: 0.23 K/UL (ref 0–0.44)
ABSOLUTE IMMATURE GRANULOCYTE: <0.03 K/UL (ref 0–0.3)
ABSOLUTE LYMPH #: 2.87 K/UL (ref 1.1–3.7)
ABSOLUTE MONO #: 0.68 K/UL (ref 0.1–1.2)
ALBUMIN SERPL-MCNC: 4.1 G/DL (ref 3.5–5.2)
ALBUMIN/GLOBULIN RATIO: 1.2 (ref 1–2.5)
ALP BLD-CCNC: 93 U/L (ref 35–104)
ALT SERPL-CCNC: 16 U/L (ref 5–33)
ANION GAP SERPL CALCULATED.3IONS-SCNC: 15 MMOL/L (ref 9–17)
AST SERPL-CCNC: 19 U/L
BASOPHILS # BLD: 1 % (ref 0–2)
BASOPHILS ABSOLUTE: 0.07 K/UL (ref 0–0.2)
BILIRUB SERPL-MCNC: 0.39 MG/DL (ref 0.3–1.2)
BUN BLDV-MCNC: 7 MG/DL (ref 8–23)
BUN/CREAT BLD: ABNORMAL (ref 9–20)
CALCIUM SERPL-MCNC: 10.1 MG/DL (ref 8.6–10.4)
CHLORIDE BLD-SCNC: 106 MMOL/L (ref 98–107)
CHOLESTEROL, FASTING: 175 MG/DL
CHOLESTEROL/HDL RATIO: 4.4
CO2: 22 MMOL/L (ref 20–31)
CREAT SERPL-MCNC: 0.66 MG/DL (ref 0.5–0.9)
CREATININE URINE: 184.6 MG/DL (ref 28–217)
DIFFERENTIAL TYPE: ABNORMAL
EOSINOPHILS RELATIVE PERCENT: 3 % (ref 1–4)
GFR AFRICAN AMERICAN: >60 ML/MIN
GFR NON-AFRICAN AMERICAN: >60 ML/MIN
GFR SERPL CREATININE-BSD FRML MDRD: ABNORMAL ML/MIN/{1.73_M2}
GFR SERPL CREATININE-BSD FRML MDRD: ABNORMAL ML/MIN/{1.73_M2}
GLUCOSE BLD-MCNC: 77 MG/DL (ref 70–99)
HCT VFR BLD CALC: 47.3 % (ref 36.3–47.1)
HDLC SERPL-MCNC: 40 MG/DL
HEMOGLOBIN: 14.5 G/DL (ref 11.9–15.1)
IMMATURE GRANULOCYTES: 0 %
LDL CHOLESTEROL: 119 MG/DL (ref 0–130)
LYMPHOCYTES # BLD: 37 % (ref 24–43)
MCH RBC QN AUTO: 27.1 PG (ref 25.2–33.5)
MCHC RBC AUTO-ENTMCNC: 30.7 G/DL (ref 28.4–34.8)
MCV RBC AUTO: 88.2 FL (ref 82.6–102.9)
MICROALBUMIN/CREAT 24H UR: 37 MG/L
MICROALBUMIN/CREAT UR-RTO: 20 MCG/MG CREAT
MONOCYTES # BLD: 9 % (ref 3–12)
NRBC AUTOMATED: 0 PER 100 WBC
PDW BLD-RTO: 15.2 % (ref 11.8–14.4)
PLATELET # BLD: 256 K/UL (ref 138–453)
PLATELET ESTIMATE: ABNORMAL
PMV BLD AUTO: 12.4 FL (ref 8.1–13.5)
POTASSIUM SERPL-SCNC: 3.8 MMOL/L (ref 3.7–5.3)
RBC # BLD: 5.36 M/UL (ref 3.95–5.11)
RBC # BLD: ABNORMAL 10*6/UL
SEG NEUTROPHILS: 50 % (ref 36–65)
SEGMENTED NEUTROPHILS ABSOLUTE COUNT: 3.91 K/UL (ref 1.5–8.1)
SODIUM BLD-SCNC: 143 MMOL/L (ref 135–144)
TOTAL PROTEIN: 7.6 G/DL (ref 6.4–8.3)
TRIGLYCERIDE, FASTING: 82 MG/DL
VLDLC SERPL CALC-MCNC: ABNORMAL MG/DL (ref 1–30)
WBC # BLD: 7.8 K/UL (ref 3.5–11.3)
WBC # BLD: ABNORMAL 10*3/UL

## 2018-12-13 PROCEDURE — G8417 CALC BMI ABV UP PARAM F/U: HCPCS | Performed by: INTERNAL MEDICINE

## 2018-12-13 PROCEDURE — 4040F PNEUMOC VAC/ADMIN/RCVD: CPT | Performed by: INTERNAL MEDICINE

## 2018-12-13 PROCEDURE — 82043 UR ALBUMIN QUANTITATIVE: CPT

## 2018-12-13 PROCEDURE — 3017F COLORECTAL CA SCREEN DOC REV: CPT | Performed by: INTERNAL MEDICINE

## 2018-12-13 PROCEDURE — 80053 COMPREHEN METABOLIC PANEL: CPT

## 2018-12-13 PROCEDURE — 1101F PT FALLS ASSESS-DOCD LE1/YR: CPT | Performed by: INTERNAL MEDICINE

## 2018-12-13 PROCEDURE — 80061 LIPID PANEL: CPT

## 2018-12-13 PROCEDURE — 2022F DILAT RTA XM EVC RTNOPTHY: CPT | Performed by: INTERNAL MEDICINE

## 2018-12-13 PROCEDURE — 90686 IIV4 VACC NO PRSV 0.5 ML IM: CPT | Performed by: INTERNAL MEDICINE

## 2018-12-13 PROCEDURE — 99214 OFFICE O/P EST MOD 30 MIN: CPT | Performed by: INTERNAL MEDICINE

## 2018-12-13 PROCEDURE — 1036F TOBACCO NON-USER: CPT | Performed by: INTERNAL MEDICINE

## 2018-12-13 PROCEDURE — 3045F PR MOST RECENT HEMOGLOBIN A1C LEVEL 7.0-9.0%: CPT | Performed by: INTERNAL MEDICINE

## 2018-12-13 PROCEDURE — G8482 FLU IMMUNIZE ORDER/ADMIN: HCPCS | Performed by: INTERNAL MEDICINE

## 2018-12-13 PROCEDURE — G8427 DOCREV CUR MEDS BY ELIG CLIN: HCPCS | Performed by: INTERNAL MEDICINE

## 2018-12-13 PROCEDURE — G8399 PT W/DXA RESULTS DOCUMENT: HCPCS | Performed by: INTERNAL MEDICINE

## 2018-12-13 PROCEDURE — 82570 ASSAY OF URINE CREATININE: CPT

## 2018-12-13 PROCEDURE — 99211 OFF/OP EST MAY X REQ PHY/QHP: CPT | Performed by: INTERNAL MEDICINE

## 2018-12-13 PROCEDURE — 1123F ACP DISCUSS/DSCN MKR DOCD: CPT | Performed by: INTERNAL MEDICINE

## 2018-12-13 PROCEDURE — 36415 COLL VENOUS BLD VENIPUNCTURE: CPT

## 2018-12-13 PROCEDURE — 1090F PRES/ABSN URINE INCON ASSESS: CPT | Performed by: INTERNAL MEDICINE

## 2018-12-13 PROCEDURE — 85025 COMPLETE CBC W/AUTO DIFF WBC: CPT

## 2018-12-13 RX ORDER — MULTIVITAMIN WITH FOLIC ACID 400 MCG
1 TABLET ORAL DAILY
Qty: 30 TABLET | Refills: 5 | Status: SHIPPED | OUTPATIENT
Start: 2018-12-13 | End: 2019-04-22 | Stop reason: SDUPTHER

## 2018-12-13 ASSESSMENT — PATIENT HEALTH QUESTIONNAIRE - PHQ9
2. FEELING DOWN, DEPRESSED OR HOPELESS: 0
SUM OF ALL RESPONSES TO PHQ QUESTIONS 1-9: 0
SUM OF ALL RESPONSES TO PHQ QUESTIONS 1-9: 0
1. LITTLE INTEREST OR PLEASURE IN DOING THINGS: 0
SUM OF ALL RESPONSES TO PHQ9 QUESTIONS 1 & 2: 0

## 2018-12-20 RX ORDER — ASPIRIN 81 MG/1
81 TABLET ORAL DAILY
Qty: 30 TABLET | Refills: 0 | Status: SHIPPED | OUTPATIENT
Start: 2018-12-20 | End: 2019-01-17 | Stop reason: SDUPTHER

## 2018-12-20 RX ORDER — GABAPENTIN 300 MG/1
CAPSULE ORAL
Qty: 90 CAPSULE | Refills: 0 | Status: SHIPPED | OUTPATIENT
Start: 2018-12-20 | End: 2019-01-17 | Stop reason: SDUPTHER

## 2019-01-03 RX ORDER — APIXABAN 5 MG/1
5 TABLET, FILM COATED ORAL 2 TIMES DAILY
Qty: 60 TABLET | Refills: 0 | Status: SHIPPED | OUTPATIENT
Start: 2019-01-03 | End: 2019-01-28 | Stop reason: SDUPTHER

## 2019-01-03 RX ORDER — LOVASTATIN 20 MG/1
20 TABLET ORAL NIGHTLY
Qty: 30 TABLET | Refills: 0 | Status: SHIPPED | OUTPATIENT
Start: 2019-01-03 | End: 2019-01-28 | Stop reason: SDUPTHER

## 2019-01-03 RX ORDER — PEN NEEDLE, DIABETIC 32GX 5/32"
NEEDLE, DISPOSABLE MISCELLANEOUS
Qty: 100 EACH | Refills: 0 | Status: SHIPPED | OUTPATIENT
Start: 2019-01-03 | End: 2019-01-28 | Stop reason: SDUPTHER

## 2019-01-03 RX ORDER — LISINOPRIL 5 MG/1
5 TABLET ORAL DAILY
Qty: 30 TABLET | Refills: 0 | Status: SHIPPED | OUTPATIENT
Start: 2019-01-03 | End: 2019-01-28 | Stop reason: SDUPTHER

## 2019-01-03 RX ORDER — PANTOPRAZOLE SODIUM 40 MG/1
40 TABLET, DELAYED RELEASE ORAL DAILY
Qty: 30 TABLET | Refills: 0 | Status: SHIPPED | OUTPATIENT
Start: 2019-01-03 | End: 2019-01-28 | Stop reason: SDUPTHER

## 2019-01-07 RX ORDER — SYRINGE AND NEEDLE,INSULIN,1ML 31 GX5/16"
SYRINGE, EMPTY DISPOSABLE MISCELLANEOUS
Qty: 100 EACH | Refills: 0 | Status: SHIPPED | OUTPATIENT
Start: 2019-01-07 | End: 2019-04-05 | Stop reason: SDUPTHER

## 2019-01-17 RX ORDER — GABAPENTIN 300 MG/1
CAPSULE ORAL
Qty: 90 CAPSULE | Refills: 0 | Status: SHIPPED | OUTPATIENT
Start: 2019-01-17 | End: 2019-02-22 | Stop reason: SDUPTHER

## 2019-01-17 RX ORDER — ASPIRIN 81 MG/1
81 TABLET ORAL DAILY
Qty: 30 TABLET | Refills: 0 | Status: SHIPPED | OUTPATIENT
Start: 2019-01-17 | End: 2019-02-22 | Stop reason: SDUPTHER

## 2019-02-13 DIAGNOSIS — Z79.4 TYPE 2 DIABETES MELLITUS WITHOUT COMPLICATION, WITH LONG-TERM CURRENT USE OF INSULIN (HCC): ICD-10-CM

## 2019-02-13 DIAGNOSIS — E11.9 TYPE 2 DIABETES MELLITUS WITHOUT COMPLICATION, WITH LONG-TERM CURRENT USE OF INSULIN (HCC): ICD-10-CM

## 2019-02-13 RX ORDER — INSULIN GLARGINE 100 [IU]/ML
75 INJECTION, SOLUTION SUBCUTANEOUS 2 TIMES DAILY
Qty: 50 ML | Refills: 1 | Status: SHIPPED | OUTPATIENT
Start: 2019-02-13 | End: 2019-04-17 | Stop reason: SDUPTHER

## 2019-02-23 RX ORDER — ASPIRIN 81 MG/1
81 TABLET ORAL DAILY
Qty: 30 TABLET | Refills: 0 | Status: SHIPPED | OUTPATIENT
Start: 2019-02-23 | End: 2019-03-25 | Stop reason: SDUPTHER

## 2019-02-23 RX ORDER — GABAPENTIN 300 MG/1
CAPSULE ORAL
Qty: 90 CAPSULE | Refills: 0 | Status: SHIPPED | OUTPATIENT
Start: 2019-02-23 | End: 2019-03-24 | Stop reason: SDUPTHER

## 2019-03-26 RX ORDER — ASPIRIN 81 MG/1
TABLET, COATED ORAL
Qty: 30 TABLET | Refills: 0 | Status: SHIPPED | OUTPATIENT
Start: 2019-03-26 | End: 2019-04-22 | Stop reason: SDUPTHER

## 2019-03-26 RX ORDER — GABAPENTIN 300 MG/1
CAPSULE ORAL
Qty: 90 CAPSULE | Refills: 0 | Status: SHIPPED | OUTPATIENT
Start: 2019-03-26 | End: 2019-04-22 | Stop reason: SDUPTHER

## 2019-03-28 ENCOUNTER — TELEPHONE (OUTPATIENT)
Dept: INTERNAL MEDICINE | Age: 69
End: 2019-03-28

## 2019-04-03 RX ORDER — PEN NEEDLE, DIABETIC 29 G X1/2"
NEEDLE, DISPOSABLE MISCELLANEOUS
Qty: 100 EACH | Refills: 3 | Status: SHIPPED | OUTPATIENT
Start: 2019-04-03 | End: 2019-04-22 | Stop reason: SDUPTHER

## 2019-04-05 RX ORDER — SYRINGE AND NEEDLE,INSULIN,1ML 31 GX5/16"
SYRINGE, EMPTY DISPOSABLE MISCELLANEOUS
Qty: 100 EACH | Refills: 0 | Status: SHIPPED | OUTPATIENT
Start: 2019-04-05 | End: 2019-10-23 | Stop reason: SDUPTHER

## 2019-04-05 NOTE — TELEPHONE ENCOUNTER
([facility] [address]     OA (osteoarthritis)     Hyperlipidemia     Anxiety and depression     Adnexal mass     Pelvic mass     Thyroid nodule

## 2019-04-17 ENCOUNTER — TELEPHONE (OUTPATIENT)
Dept: INTERNAL MEDICINE | Age: 69
End: 2019-04-17

## 2019-04-17 DIAGNOSIS — Z79.4 TYPE 2 DIABETES MELLITUS WITHOUT COMPLICATION, WITH LONG-TERM CURRENT USE OF INSULIN (HCC): ICD-10-CM

## 2019-04-17 DIAGNOSIS — Z79.4 TYPE 2 DIABETES MELLITUS WITH PROLIFERATIVE RETINOPATHY OF LEFT EYE, WITH LONG-TERM CURRENT USE OF INSULIN, MACULAR EDEMA PRESENCE UNSPECIFIED, UNSPECIFIED PROLIFERATIVE RETINOPATHY TYPE (HCC): Primary | ICD-10-CM

## 2019-04-17 DIAGNOSIS — E11.3592 TYPE 2 DIABETES MELLITUS WITH PROLIFERATIVE RETINOPATHY OF LEFT EYE, WITH LONG-TERM CURRENT USE OF INSULIN, MACULAR EDEMA PRESENCE UNSPECIFIED, UNSPECIFIED PROLIFERATIVE RETINOPATHY TYPE (HCC): Primary | ICD-10-CM

## 2019-04-17 DIAGNOSIS — E11.9 TYPE 2 DIABETES MELLITUS WITHOUT COMPLICATION, WITH LONG-TERM CURRENT USE OF INSULIN (HCC): ICD-10-CM

## 2019-04-17 RX ORDER — INSULIN GLARGINE 100 [IU]/ML
75 INJECTION, SOLUTION SUBCUTANEOUS 2 TIMES DAILY
Qty: 50 ML | Refills: 1 | Status: SHIPPED | OUTPATIENT
Start: 2019-04-17 | End: 2019-06-25 | Stop reason: DRUGHIGH

## 2019-04-17 NOTE — TELEPHONE ENCOUNTER
PA request received for Lantus. This is non-formulary. Pt must try/fail/have contraindication to formulary agent(s):Basaglar    Please dc non formulary medication and order the preferred medication if appropriate. Thanks!

## 2019-04-17 NOTE — TELEPHONE ENCOUNTER
Escribe request for lantus 100    Next Visit Date:  Future Appointments   Date Time Provider Sigrid Mcgrathi   4/25/2019  3:15 PM Tere Hendricks MD [facility] [facility]   Topic Date Due    Shingles Vaccine (1 of 2) 03/28/1999    Diabetic retinal exam  06/19/2016    Breast cancer screen  10/29/2018    Colon cancer screen colonoscopy  08/08/2019    Diabetic foot exam  09/11/2019    A1C test (Diabetic or Prediabetic)  09/11/2019    Diabetic microalbuminuria test  12/13/2019    Lipid screen  12/13/2019    Potassium monitoring  12/13/2019    Creatinine monitoring  12/13/2019    DTaP/Tdap/Td vaccine (2 - Td) 02/01/2023    DEXA (modify frequency per FRAX score)  Completed    Flu vaccine  Completed    Pneumococcal 65+ years Vaccine  Completed    Hepatitis C screen  Completed             (applicable per patient's age: Cancer Screenings, Depression Screening, Fall Risk Screening, Immunizations)    Hemoglobin A1C (%)   Date Value   09/11/2018 7.1   11/13/2017 13.3   03/07/2017 14.9 (H)     Microalb/Crt.  Ratio (mcg/mg creat)   Date Value   12/13/2018 20     LDL Cholesterol (mg/dL)   Date Value   12/13/2018 119     AST (U/L)   Date Value   12/13/2018 19     ALT (U/L)   Date Value   12/13/2018 16     BUN (mg/dL)   Date Value   12/13/2018 7 (L)      (goal A1C is < 7)   (goal LDL is <100) need 30-50% reduction from baseline     BP Readings from Last 3 Encounters:   12/13/18 120/74   09/11/18 123/64   01/02/18 138/78    (goal /80)      All Future Testing planned in [facility]:  Lab Frequency Next Occurrence   NANDINI Digital Screen Bilateral [[id number]] Once 07/12/2019            Patient Active Problem List:     Fibroid     Lung nodule     Leiomyomatosis     Restrictive lung disease     Radiculopathy, cervical     Renal mass     DR (diabetic retinopathy) ([facility] [address]     HTN (hypertension)     DM (diabetes mellitus) (HCC)     Dermatomyositis (HCC)     OA (osteoarthritis)     Hyperlipidemia     Anxiety and depression     Adnexal mass     Pelvic mass     Thyroid nodule

## 2019-04-22 RX ORDER — APIXABAN 5 MG/1
5 TABLET, FILM COATED ORAL 2 TIMES DAILY
Qty: 60 TABLET | Refills: 2 | Status: SHIPPED | OUTPATIENT
Start: 2019-04-22 | End: 2019-06-24 | Stop reason: SINTOL

## 2019-04-22 RX ORDER — ASPIRIN 81 MG/1
TABLET, COATED ORAL
Qty: 30 TABLET | Refills: 0 | Status: SHIPPED | OUTPATIENT
Start: 2019-04-22 | End: 2019-05-25 | Stop reason: SDUPTHER

## 2019-04-22 RX ORDER — GABAPENTIN 300 MG/1
CAPSULE ORAL
Qty: 90 CAPSULE | Refills: 0 | Status: SHIPPED | OUTPATIENT
Start: 2019-04-22 | End: 2019-05-25 | Stop reason: SDUPTHER

## 2019-04-22 NOTE — TELEPHONE ENCOUNTER
Refill requested. Last visit: 12/13/18  Last Med refill: 12/3/18  Does patient have enough medication for 72 hours: No: last filled on 12/3/18    Next Visit Date:  Future Appointments   Date Time Provider Sigrid Begum   4/25/2019  3:15 PM Kari Benitez MD [address] [facility] Maintenance   Topic Date Due    Shingles Vaccine (1 of 2) 03/28/1999    Diabetic retinal exam  06/19/2016    Breast cancer screen  10/29/2018    Colon cancer screen colonoscopy  08/08/2019    Diabetic foot exam  09/11/2019    A1C test (Diabetic or Prediabetic)  09/11/2019    Diabetic microalbuminuria test  12/13/2019    Lipid screen  12/13/2019    Potassium monitoring  12/13/2019    Creatinine monitoring  12/13/2019    DTaP/Tdap/Td vaccine (2 - Td) 02/01/2023    DEXA (modify frequency per FRAX score)  Completed    Flu vaccine  Completed    Pneumococcal 65+ years Vaccine  Completed    Hepatitis C screen  Completed       Hemoglobin A1C (%)   Date Value   09/11/2018 7.1   11/13/2017 13.3   03/07/2017 14.9 (H)             ( goal A1C is < 7)   Microalb/Crt.  Ratio (mcg/mg creat)   Date Value   12/13/2018 20     LDL Cholesterol (mg/dL)   Date Value   12/13/2018 119   11/13/2017 123       (goal LDL is <100)   AST (U/L)   Date Value   12/13/2018 19     ALT (U/L)   Date Value   12/13/2018 16     BUN (mg/dL)   Date Value   12/13/2018 7 (L)     BP Readings from Last 3 Encounters:   12/13/18 120/74   09/11/18 123/64   01/02/18 138/78          (goal 120/80)    All Future Testing planned in [facility]  Lab Frequency Next Occurrence   NANDINI Digital Screen Bilateral [[id number]] Once 07/12/2019               Patient Active Problem List:     Fibroid     Lung nodule     Leiomyomatosis     Restrictive lung disease     Radiculopathy, cervical     Renal mass     DR (diabetic retinopathy) (HCC)     HTN (hypertension)     DM (diabetes mellitus) ([facility] [address]     Dermatomyositis (HCC)     OA (osteoarthritis)     Hyperlipidemia     Anxiety and depression     Adnexal mass     Pelvic mass     Thyroid nodule

## 2019-05-28 RX ORDER — GABAPENTIN 300 MG/1
CAPSULE ORAL
Qty: 90 CAPSULE | Refills: 0 | Status: SHIPPED | OUTPATIENT
Start: 2019-05-28 | End: 2019-06-24 | Stop reason: SDUPTHER

## 2019-05-28 RX ORDER — PANTOPRAZOLE SODIUM 40 MG/1
40 TABLET, DELAYED RELEASE ORAL DAILY
Qty: 30 TABLET | Refills: 3 | Status: SHIPPED | OUTPATIENT
Start: 2019-05-28 | End: 2019-06-24 | Stop reason: SDUPTHER

## 2019-05-28 RX ORDER — ASPIRIN 81 MG/1
TABLET, COATED ORAL
Qty: 30 TABLET | Refills: 0 | Status: SHIPPED | OUTPATIENT
Start: 2019-05-28 | End: 2019-06-24 | Stop reason: SINTOL

## 2019-05-28 RX ORDER — MULTIVITAMIN WITH FOLIC ACID 400 MCG
TABLET ORAL
Qty: 30 TABLET | Refills: 5 | Status: SHIPPED | OUTPATIENT
Start: 2019-05-28 | End: 2019-06-24 | Stop reason: ALTCHOICE

## 2019-05-28 RX ORDER — LOVASTATIN 20 MG/1
20 TABLET ORAL NIGHTLY
Qty: 30 TABLET | Refills: 3 | Status: SHIPPED | OUTPATIENT
Start: 2019-05-28 | End: 2019-06-24 | Stop reason: SDUPTHER

## 2019-05-30 NOTE — TELEPHONE ENCOUNTER
Request for Alcohol and Test strips. Next Visit Date:  Future Appointments   Date Time Provider Sigrid Mcgrathi   6/13/2019  1:45 PM Kemi Chew MD [facility] IM [address] Maintenance   Topic Date Due    Shingles Vaccine (1 of 2) 03/28/1999    Diabetic retinal exam  06/19/2016    Breast cancer screen  10/29/2018    Colon cancer screen colonoscopy  08/08/2019    Diabetic foot exam  09/11/2019    A1C test (Diabetic or Prediabetic)  09/11/2019    Diabetic microalbuminuria test  12/13/2019    Lipid screen  12/13/2019    Potassium monitoring  12/13/2019    Creatinine monitoring  12/13/2019    DTaP/Tdap/Td vaccine (2 - Td) 02/01/2023    DEXA (modify frequency per FRAX score)  Completed    Flu vaccine  Completed    Pneumococcal 65+ years Vaccine  Completed    Hepatitis C screen  Completed       Hemoglobin A1C (%)   Date Value   09/11/2018 7.1   11/13/2017 13.3   03/07/2017 14.9 (H)             ( goal A1C is < 7)   Microalb/Crt.  Ratio (mcg/mg creat)   Date Value   12/13/2018 20     LDL Cholesterol (mg/dL)   Date Value   12/13/2018 119       (goal LDL is <100)   AST (U/L)   Date Value   12/13/2018 19     ALT (U/L)   Date Value   12/13/2018 16     BUN (mg/dL)   Date Value   12/13/2018 7 (L)     BP Readings from Last 3 Encounters:   12/13/18 120/74   09/11/18 123/64   01/02/18 138/78          (goal 120/80)    All Future Testing planned in [facility]  Lab Frequency Next Occurrence   NANDINI Digital Screen Bilateral [[id number]] Once 07/12/2019         Patient Active Problem List:     Fibroid     Lung nodule     Leiomyomatosis     Restrictive lung disease     Radiculopathy, cervical     Renal mass     DR (diabetic retinopathy) (HCC)     HTN (hypertension)     DM (diabetes mellitus) (HCC)     Dermatomyositis (HCC)     OA (osteoarthritis)     Hyperlipidemia     Anxiety and depression     Adnexal mass     Pelvic mass     Thyroid nodule

## 2019-05-31 RX ORDER — PEN NEEDLE, DIABETIC 32GX 5/32"
NEEDLE, DISPOSABLE MISCELLANEOUS
Qty: 100 EACH | Refills: 3 | Status: SHIPPED | OUTPATIENT
Start: 2019-05-31 | End: 2019-10-23 | Stop reason: SDUPTHER

## 2019-06-12 ENCOUNTER — TELEPHONE (OUTPATIENT)
Dept: INTERNAL MEDICINE | Age: 69
End: 2019-06-12

## 2019-06-12 NOTE — TELEPHONE ENCOUNTER
PA request for [employer]er [employer] Caps     PA processed and submitted to pt insurance, waiting for response in regards to coverage    Medication needs a diagnosis code, please advise.

## 2019-06-24 ENCOUNTER — HOSPITAL ENCOUNTER (OUTPATIENT)
Age: 69
Setting detail: SPECIMEN
Discharge: HOME | End: 2019-06-24
Payer: MEDICAID

## 2019-06-24 ENCOUNTER — OFFICE VISIT (OUTPATIENT)
Dept: INTERNAL MEDICINE | Age: 69
End: 2019-06-24
Payer: MEDICAID

## 2019-06-24 VITALS
DIASTOLIC BLOOD PRESSURE: 71 MMHG | WEIGHT: 201.8 LBS | HEIGHT: 63 IN | HEART RATE: 84 BPM | BODY MASS INDEX: 35.75 KG/M2 | SYSTOLIC BLOOD PRESSURE: 138 MMHG

## 2019-06-24 DIAGNOSIS — K62.5 RECTAL BLEED: ICD-10-CM

## 2019-06-24 DIAGNOSIS — I10 ESSENTIAL HYPERTENSION: ICD-10-CM

## 2019-06-24 DIAGNOSIS — M33.90 DERMATOMYOSITIS (HCC): ICD-10-CM

## 2019-06-24 DIAGNOSIS — E11.319 DIABETIC RETINOPATHY OF BOTH EYES ASSOCIATED WITH TYPE 2 DIABETES MELLITUS, MACULAR EDEMA PRESENCE UNSPECIFIED, UNSPECIFIED RETINOPATHY SEVERITY (HCC): ICD-10-CM

## 2019-06-24 DIAGNOSIS — E11.3592 TYPE 2 DIABETES MELLITUS WITH PROLIFERATIVE RETINOPATHY OF LEFT EYE, WITH LONG-TERM CURRENT USE OF INSULIN, MACULAR EDEMA PRESENCE UNSPECIFIED, UNSPECIFIED PROLIFERATIVE RETINOPATHY TYPE (HCC): ICD-10-CM

## 2019-06-24 DIAGNOSIS — K62.5 RECTAL BLEED: Primary | ICD-10-CM

## 2019-06-24 DIAGNOSIS — Z12.31 ENCOUNTER FOR SCREENING MAMMOGRAM FOR BREAST CANCER: ICD-10-CM

## 2019-06-24 DIAGNOSIS — Z79.4 TYPE 2 DIABETES MELLITUS WITHOUT COMPLICATION, WITH LONG-TERM CURRENT USE OF INSULIN (HCC): ICD-10-CM

## 2019-06-24 DIAGNOSIS — Z79.4 TYPE 2 DIABETES MELLITUS WITH PROLIFERATIVE RETINOPATHY OF LEFT EYE, WITH LONG-TERM CURRENT USE OF INSULIN, MACULAR EDEMA PRESENCE UNSPECIFIED, UNSPECIFIED PROLIFERATIVE RETINOPATHY TYPE (HCC): ICD-10-CM

## 2019-06-24 DIAGNOSIS — E11.9 TYPE 2 DIABETES MELLITUS WITHOUT COMPLICATION, WITH LONG-TERM CURRENT USE OF INSULIN (HCC): ICD-10-CM

## 2019-06-24 LAB
HBA1C MFR BLD: 7.3 %
HCT VFR BLD CALC: 37.9 % (ref 36.3–47.1)
HEMOGLOBIN: 11.1 G/DL (ref 11.9–15.1)

## 2019-06-24 PROCEDURE — 85018 HEMOGLOBIN: CPT

## 2019-06-24 PROCEDURE — G8427 DOCREV CUR MEDS BY ELIG CLIN: HCPCS | Performed by: INTERNAL MEDICINE

## 2019-06-24 PROCEDURE — 3017F COLORECTAL CA SCREEN DOC REV: CPT | Performed by: INTERNAL MEDICINE

## 2019-06-24 PROCEDURE — 36415 COLL VENOUS BLD VENIPUNCTURE: CPT

## 2019-06-24 PROCEDURE — 83036 HEMOGLOBIN GLYCOSYLATED A1C: CPT | Performed by: INTERNAL MEDICINE

## 2019-06-24 PROCEDURE — 1111F DSCHRG MED/CURRENT MED MERGE: CPT | Performed by: INTERNAL MEDICINE

## 2019-06-24 PROCEDURE — 4040F PNEUMOC VAC/ADMIN/RCVD: CPT | Performed by: INTERNAL MEDICINE

## 2019-06-24 PROCEDURE — 1036F TOBACCO NON-USER: CPT | Performed by: INTERNAL MEDICINE

## 2019-06-24 PROCEDURE — 99214 OFFICE O/P EST MOD 30 MIN: CPT | Performed by: INTERNAL MEDICINE

## 2019-06-24 PROCEDURE — 1090F PRES/ABSN URINE INCON ASSESS: CPT | Performed by: INTERNAL MEDICINE

## 2019-06-24 PROCEDURE — 99211 OFF/OP EST MAY X REQ PHY/QHP: CPT | Performed by: INTERNAL MEDICINE

## 2019-06-24 PROCEDURE — 85014 HEMATOCRIT: CPT

## 2019-06-24 PROCEDURE — G8399 PT W/DXA RESULTS DOCUMENT: HCPCS | Performed by: INTERNAL MEDICINE

## 2019-06-24 PROCEDURE — 2022F DILAT RTA XM EVC RTNOPTHY: CPT | Performed by: INTERNAL MEDICINE

## 2019-06-24 PROCEDURE — 1123F ACP DISCUSS/DSCN MKR DOCD: CPT | Performed by: INTERNAL MEDICINE

## 2019-06-24 PROCEDURE — 3045F PR MOST RECENT HEMOGLOBIN A1C LEVEL 7.0-9.0%: CPT | Performed by: INTERNAL MEDICINE

## 2019-06-24 PROCEDURE — G8417 CALC BMI ABV UP PARAM F/U: HCPCS | Performed by: INTERNAL MEDICINE

## 2019-06-24 RX ORDER — GABAPENTIN 300 MG/1
300 CAPSULE ORAL 3 TIMES DAILY
Qty: 90 CAPSULE | Refills: 2 | Status: SHIPPED | OUTPATIENT
Start: 2019-06-24 | End: 2019-10-23 | Stop reason: SDUPTHER

## 2019-06-24 RX ORDER — LISINOPRIL 5 MG/1
5 TABLET ORAL DAILY
Qty: 30 TABLET | Refills: 3 | Status: SHIPPED | OUTPATIENT
Start: 2019-06-24 | End: 2019-09-27 | Stop reason: SDUPTHER

## 2019-06-24 RX ORDER — DILTIAZEM HYDROCHLORIDE 120 MG/1
120 CAPSULE, COATED, EXTENDED RELEASE ORAL DAILY
COMMUNITY
End: 2019-06-24 | Stop reason: SDUPTHER

## 2019-06-24 RX ORDER — DILTIAZEM HYDROCHLORIDE 120 MG/1
120 CAPSULE, COATED, EXTENDED RELEASE ORAL DAILY
Qty: 30 CAPSULE | Refills: 2 | Status: SHIPPED | OUTPATIENT
Start: 2019-06-24 | End: 2019-09-25 | Stop reason: SDUPTHER

## 2019-06-24 RX ORDER — PNV NO.95/FERROUS FUM/FOLIC AC 28MG-0.8MG
325 TABLET ORAL
Qty: 30 TABLET | Refills: 3 | Status: SHIPPED | OUTPATIENT
Start: 2019-06-24 | End: 2019-09-25 | Stop reason: SDUPTHER

## 2019-06-24 RX ORDER — PANTOPRAZOLE SODIUM 40 MG/1
40 TABLET, DELAYED RELEASE ORAL DAILY
Qty: 30 TABLET | Refills: 3 | Status: SHIPPED | OUTPATIENT
Start: 2019-06-24 | End: 2019-09-27 | Stop reason: SDUPTHER

## 2019-06-24 RX ORDER — LOVASTATIN 20 MG/1
20 TABLET ORAL NIGHTLY
Qty: 30 TABLET | Refills: 3 | Status: SHIPPED | OUTPATIENT
Start: 2019-06-24 | End: 2019-09-27 | Stop reason: SDUPTHER

## 2019-06-24 ASSESSMENT — PATIENT HEALTH QUESTIONNAIRE - PHQ9
1. LITTLE INTEREST OR PLEASURE IN DOING THINGS: 0
2. FEELING DOWN, DEPRESSED OR HOPELESS: 0
SUM OF ALL RESPONSES TO PHQ QUESTIONS 1-9: 0
SUM OF ALL RESPONSES TO PHQ9 QUESTIONS 1 & 2: 0
SUM OF ALL RESPONSES TO PHQ QUESTIONS 1-9: 0

## 2019-06-24 NOTE — PATIENT INSTRUCTIONS
Medications e-scribe to pharmacy of pt's choice. Script for Mammogram and instructions for NANDINI given to pt, pt will call [phone] and schedule appt. An After Visit Summary was printed and given to the patient. CB    MAMMOGRAM INSTRUCTIONS    Your physician has ordered a mammogram for you. Mammography is an X-ray that creates an image of the breast.     To prepare yourself for the test shower, or bathe, as usual, but do not use any deodorants, powders, or perfumes under or around the breast or chest area. You will be called by the Scheduling Department to schedule your testing. If you do not hear from them within a week, please call them at [phone] to schedule the appointment. This will be done at any [address] location of your choice. Report to the Admitting Department 20 minutes before the test to complete the proper paperwork. If you cannot keep this appointment, please call [phone] to cancel and reschedule your appointment.

## 2019-06-25 RX ORDER — INSULIN GLARGINE 100 [IU]/ML
50 INJECTION, SOLUTION SUBCUTANEOUS NIGHTLY
Qty: 50 ML | Refills: 1 | Status: SHIPPED | OUTPATIENT
Start: 2019-06-25 | End: 2019-09-25 | Stop reason: SDUPTHER

## 2019-06-25 NOTE — PROGRESS NOTES
Office Progress Note  Date of patient's visit: 6/25/2019  Patient's Name:  Anna Marie Villanueva YOB: 1950            ================================================================    REASON FOR VISIT/CHIEF COMPLAINT:  Follow-Up from [facility]/20/2019 GI Bleed); Health Maintenance (Patient states she has had both Shingles Shot. Done at [facility] ); and Medication Refill (Patient states she needs all medications refilled. )      HISTORY OF PRESENTING ILLNESS:  Anna Marie is here to follow-up on    Hospital admission at [facility] in [place] in May 2019  Patient was admitted with bright red blood per rectum  She had to receive 3 units blood transfusion  Colonoscopy was done that did not show any active bleeding hence because of bleeding was not determined  Eliquis (was on it for h/o atrial flutter)and aspirin were discontinued  No further bleeding episodes since discharge  Patient states she feels well  For her atrial flutter, she is on Imperial. Heart rate is stable    Hypertension-blood pressure stable on lisinopril 5 mg and Cartia 120 mg      H/o back pain, chronic neck pain osteoarthritis of multiple joints. She uses cane for ambulation. He is also on gabapentin    Patient has history of a mass in her right external ear which is present for many years, I had made a referral to ENT but she has not seen them yet    Diabetes mellitus-A1c today is 7.3  Patient was initially on Lantus 75 units twice daily from the hospital she was discharged on 5 units but because her glucose was high at home she increased it to 50 units. Currently she is taking 50 units nightly and glucose levels are good. She has history of multiple lung nodules, biopsy was done in 2017 which showed benign process.   She also has history of pelvic mass which has been stable on follow-up imaging    Needs mammogram      Current Outpatient Medications   Medication Sig Dispense Refill    diltiazem (CARTIA XT) 120 MG extended release capsule Take 1 capsule by mouth daily 30 capsule 2    gabapentin (NEURONTIN) 300 MG capsule Take 1 capsule by mouth 3 times daily for 61 days. 90 capsule 2    lovastatin (MEVACOR) 20 MG tablet Take 1 tablet by mouth nightly 30 tablet 3    lisinopril (PRINIVIL;ZESTRIL) 5 MG tablet Take 1 tablet by mouth daily 30 tablet 3    Ferrous Sulfate (IRON) 325 (65 Fe) MG TABS Take 325 mg by mouth daily (with breakfast) 30 tablet 3    pantoprazole (PROTONIX) 40 MG tablet Take 1 tablet by mouth daily 30 tablet 3    Alcohol Swabs (PRO COMFORT ALCOHOL) 70 % PADS USE AS DIRECTED THREE TIMES A  each 3    ONE TOUCH ULTRA TEST strip TEST BLOOD SUGAR THREE TIMES A DAY AS NEEDED 100 strip 3    LANTUS 100 UNIT/ML injection vial INJECT 75 UNITS INTO THE SKIN 2 TIMES DAILY 50 mL 1    ULTICARE INSULIN SYRINGE 31G X 5/16\" 1 ML MISC USE AS DIRECTED TWICE A  each 0    Multiple Vitamins-Minerals (PRESERVISION AREDS 2) CAPS TAKE 2 CAPSULES BY MOUTH DAILY 60 capsule 3    ONETOUCH DELICA LANCETS FINE MISC USE AS DIRECTED THREE TIMES A  each 0     No current facility-administered medications for this visit. REVIEW OF SYSTEMS:  HENT: Negative for nosebleeds. Respiratory: Negative for cough, shortness of breath, wheezing and stridor. Cardiovascular: Negative for chest pain, palpitations and leg swelling. Gastrointestinal: Negative for nausea, vomiting, abdominal pain, diarrhea and constipation. Genitourinary: Negative for hematuria. PHYSICAL EXAM:  Vitals:    06/24/19 1344   BP: 138/71   Site: Left Upper Arm   Position: Sitting   Cuff Size: Medium Adult   Pulse: 84   Weight: 201 lb 12.8 oz (91.5 kg)   Height: 5' 3\" (1.6 m)       Constitutional: She is oriented to person, place, and time. She appears well-developed. No distress. Cardiovascular: Normal rate and regular rhythm. Pulmonary/Chest: Effort normal and breath sounds normal. No stridor. No respiratory distress. no wheezes.   no PM    This note is created with the assistance of a speech-recognition program. While intending to generate a document that actually reflects the content of the visit, the document can still have some mistakes which may not have been identified and corrected by editing.

## 2019-07-02 NOTE — TELEPHONE ENCOUNTER
Refill request for Aspirin. If appropriate please send medication(s) to patients pharmacy. Next appt: 09/25/2019    Health Maintenance   Topic Date Due    Diabetic retinal exam  06/19/2016    Breast cancer screen  10/29/2018    Shingles Vaccine (2 of 2) 01/06/2019    Colon cancer screen colonoscopy  08/08/2019    Flu vaccine (1) 09/01/2019    Diabetic foot exam  09/11/2019    Diabetic microalbuminuria test  12/13/2019    Lipid screen  12/13/2019    Potassium monitoring  12/13/2019    Creatinine monitoring  12/13/2019    A1C test (Diabetic or Prediabetic)  06/24/2020    DTaP/Tdap/Td vaccine (2 - Td) 02/01/2023    DEXA (modify frequency per FRAX score)  Completed    Pneumococcal 65+ years Vaccine  Completed    Hepatitis C screen  Completed       Hemoglobin A1C (%)   Date Value   06/24/2019 7.3   09/11/2018 7.1   11/13/2017 13.3             ( goal A1C is < 7)   Microalb/Crt.  Ratio (mcg/mg creat)   Date Value   12/13/2018 20     LDL Cholesterol (mg/dL)   Date Value   12/13/2018 119       (goal LDL is <100)   AST (U/L)   Date Value   12/13/2018 19     ALT (U/L)   Date Value   12/13/2018 16     BUN (mg/dL)   Date Value   12/13/2018 7 (L)     BP Readings from Last 3 Encounters:   06/24/19 138/71   12/13/18 120/74   09/11/18 123/64          (goal 120/80)          Patient Active Problem List:     Fibroid     Lung nodule     Leiomyomatosis     Restrictive lung disease     Radiculopathy, cervical     Renal mass     DR (diabetic retinopathy) (HCC)     HTN (hypertension)     DM (diabetes mellitus) (HCC)     Dermatomyositis (HCC)     OA (osteoarthritis)     Hyperlipidemia     Anxiety and depression     Adnexal mass     Pelvic mass     Thyroid nodule

## 2019-07-06 RX ORDER — ASPIRIN 81 MG/1
TABLET, COATED ORAL
Qty: 30 TABLET | Refills: 5 | Status: SHIPPED | OUTPATIENT
Start: 2019-07-06 | End: 2019-09-25 | Stop reason: SDUPTHER

## 2019-07-11 ENCOUNTER — TELEPHONE (OUTPATIENT)
Dept: GASTROENTEROLOGY | Age: 69
End: 2019-07-11

## 2019-09-25 ENCOUNTER — TELEPHONE (OUTPATIENT)
Dept: INTERNAL MEDICINE | Age: 69
End: 2019-09-25

## 2019-09-25 DIAGNOSIS — Z79.4 TYPE 2 DIABETES MELLITUS WITHOUT COMPLICATION, WITH LONG-TERM CURRENT USE OF INSULIN (HCC): ICD-10-CM

## 2019-09-25 DIAGNOSIS — E11.9 TYPE 2 DIABETES MELLITUS WITHOUT COMPLICATION, WITH LONG-TERM CURRENT USE OF INSULIN (HCC): ICD-10-CM

## 2019-09-25 RX ORDER — GABAPENTIN 300 MG/1
300 CAPSULE ORAL 3 TIMES DAILY
Qty: 90 CAPSULE | Refills: 3 | Status: CANCELLED | OUTPATIENT
Start: 2019-09-25 | End: 2019-11-25

## 2019-09-27 RX ORDER — PNV NO.95/FERROUS FUM/FOLIC AC 28MG-0.8MG
325 TABLET ORAL
Qty: 30 TABLET | Refills: 3 | Status: SHIPPED | OUTPATIENT
Start: 2019-09-27 | End: 2019-10-23 | Stop reason: SDUPTHER

## 2019-09-27 RX ORDER — LOVASTATIN 20 MG/1
20 TABLET ORAL NIGHTLY
Qty: 30 TABLET | Refills: 3 | Status: SHIPPED | OUTPATIENT
Start: 2019-09-27 | End: 2019-10-23 | Stop reason: SDUPTHER

## 2019-09-27 RX ORDER — LISINOPRIL 5 MG/1
5 TABLET ORAL DAILY
Qty: 30 TABLET | Refills: 3 | Status: SHIPPED | OUTPATIENT
Start: 2019-09-27 | End: 2019-10-23 | Stop reason: SDUPTHER

## 2019-09-27 RX ORDER — INSULIN GLARGINE 100 [IU]/ML
50 INJECTION, SOLUTION SUBCUTANEOUS NIGHTLY
Qty: 50 ML | Refills: 3 | Status: SHIPPED | OUTPATIENT
Start: 2019-09-27 | End: 2019-10-23 | Stop reason: SDUPTHER

## 2019-09-27 RX ORDER — DILTIAZEM HYDROCHLORIDE 120 MG/1
120 CAPSULE, COATED, EXTENDED RELEASE ORAL DAILY
Qty: 30 CAPSULE | Refills: 3 | Status: SHIPPED | OUTPATIENT
Start: 2019-09-27 | End: 2019-10-23 | Stop reason: SDUPTHER

## 2019-09-27 RX ORDER — PANTOPRAZOLE SODIUM 40 MG/1
40 TABLET, DELAYED RELEASE ORAL DAILY
Qty: 30 TABLET | Refills: 3 | Status: SHIPPED | OUTPATIENT
Start: 2019-09-27 | End: 2019-10-23 | Stop reason: SDUPTHER

## 2019-09-27 RX ORDER — ASPIRIN 81 MG/1
TABLET ORAL
Qty: 30 TABLET | Refills: 3 | Status: SHIPPED | OUTPATIENT
Start: 2019-09-27 | End: 2020-01-08 | Stop reason: SDUPTHER

## 2019-09-27 NOTE — TELEPHONE ENCOUNTER
I have refilled the medications keeping in mind about the patient's requirements but I don't know this patient and have never seen her.

## 2019-10-16 ENCOUNTER — HOSPITAL ENCOUNTER (OUTPATIENT)
Dept: MAMMOGRAPHY | Age: 69
Discharge: HOME | End: 2019-10-18
Payer: MEDICAID

## 2019-10-16 DIAGNOSIS — Z12.31 ENCOUNTER FOR SCREENING MAMMOGRAM FOR BREAST CANCER: ICD-10-CM

## 2019-10-16 PROCEDURE — 77063 BREAST TOMOSYNTHESIS BI: CPT

## 2019-10-23 ENCOUNTER — HOSPITAL ENCOUNTER (OUTPATIENT)
Age: 69
Setting detail: SPECIMEN
Discharge: HOME | End: 2019-10-23
Payer: MEDICAID

## 2019-10-23 ENCOUNTER — OFFICE VISIT (OUTPATIENT)
Dept: INTERNAL MEDICINE | Age: 69
End: 2019-10-23
Payer: MEDICAID

## 2019-10-23 VITALS
DIASTOLIC BLOOD PRESSURE: 82 MMHG | HEIGHT: 63 IN | HEART RATE: 108 BPM | WEIGHT: 196 LBS | SYSTOLIC BLOOD PRESSURE: 152 MMHG | BODY MASS INDEX: 34.73 KG/M2

## 2019-10-23 DIAGNOSIS — D64.9 ANEMIA, UNSPECIFIED TYPE: ICD-10-CM

## 2019-10-23 DIAGNOSIS — I48.92 ATRIAL FLUTTER, UNSPECIFIED TYPE (HCC): ICD-10-CM

## 2019-10-23 DIAGNOSIS — Z23 INFLUENZA VACCINATION GIVEN: ICD-10-CM

## 2019-10-23 DIAGNOSIS — E11.9 TYPE 2 DIABETES MELLITUS WITHOUT COMPLICATION, WITH LONG-TERM CURRENT USE OF INSULIN (HCC): Primary | ICD-10-CM

## 2019-10-23 DIAGNOSIS — I10 ESSENTIAL HYPERTENSION: ICD-10-CM

## 2019-10-23 DIAGNOSIS — Z23 NEEDS FLU SHOT: ICD-10-CM

## 2019-10-23 DIAGNOSIS — M19.91 PRIMARY OSTEOARTHRITIS, UNSPECIFIED SITE: ICD-10-CM

## 2019-10-23 DIAGNOSIS — H93.8X1 EAR MASS, RIGHT: ICD-10-CM

## 2019-10-23 DIAGNOSIS — Z79.4 TYPE 2 DIABETES MELLITUS WITHOUT COMPLICATION, WITH LONG-TERM CURRENT USE OF INSULIN (HCC): Primary | ICD-10-CM

## 2019-10-23 LAB
ABSOLUTE EOS #: 0.16 K/UL (ref 0–0.44)
ABSOLUTE IMMATURE GRANULOCYTE: 0.03 K/UL (ref 0–0.3)
ABSOLUTE LYMPH #: 2.12 K/UL (ref 1.1–3.7)
ABSOLUTE MONO #: 0.68 K/UL (ref 0.1–1.2)
ANION GAP SERPL CALCULATED.3IONS-SCNC: 16 MMOL/L (ref 9–17)
BASOPHILS # BLD: 1 % (ref 0–2)
BASOPHILS ABSOLUTE: 0.07 K/UL (ref 0–0.2)
BUN BLDV-MCNC: 8 MG/DL (ref 8–23)
BUN/CREAT BLD: ABNORMAL (ref 9–20)
CALCIUM SERPL-MCNC: 10.5 MG/DL (ref 8.6–10.4)
CHLORIDE BLD-SCNC: 105 MMOL/L (ref 98–107)
CHOLESTEROL/HDL RATIO: 4.2
CHOLESTEROL: 165 MG/DL
CO2: 22 MMOL/L (ref 20–31)
CREAT SERPL-MCNC: 0.72 MG/DL (ref 0.5–0.9)
DIFFERENTIAL TYPE: ABNORMAL
EOSINOPHILS RELATIVE PERCENT: 2 % (ref 1–4)
GFR AFRICAN AMERICAN: >60 ML/MIN
GFR NON-AFRICAN AMERICAN: >60 ML/MIN
GFR SERPL CREATININE-BSD FRML MDRD: ABNORMAL ML/MIN/{1.73_M2}
GFR SERPL CREATININE-BSD FRML MDRD: ABNORMAL ML/MIN/{1.73_M2}
GLUCOSE BLD-MCNC: 100 MG/DL (ref 70–99)
HCT VFR BLD CALC: 49.4 % (ref 36.3–47.1)
HDLC SERPL-MCNC: 39 MG/DL
HEMOGLOBIN: 15.8 G/DL (ref 11.9–15.1)
IMMATURE GRANULOCYTES: 0 %
LDL CHOLESTEROL: 107 MG/DL (ref 0–130)
LYMPHOCYTES # BLD: 31 % (ref 24–43)
MCH RBC QN AUTO: 27.6 PG (ref 25.2–33.5)
MCHC RBC AUTO-ENTMCNC: 32 G/DL (ref 28.4–34.8)
MCV RBC AUTO: 86.2 FL (ref 82.6–102.9)
MONOCYTES # BLD: 10 % (ref 3–12)
NRBC AUTOMATED: 0 PER 100 WBC
PDW BLD-RTO: 16.4 % (ref 11.8–14.4)
PLATELET # BLD: 214 K/UL (ref 138–453)
PLATELET ESTIMATE: ABNORMAL
PMV BLD AUTO: 11.2 FL (ref 8.1–13.5)
POTASSIUM SERPL-SCNC: 4.2 MMOL/L (ref 3.7–5.3)
RBC # BLD: 5.73 M/UL (ref 3.95–5.11)
RBC # BLD: ABNORMAL 10*6/UL
SEG NEUTROPHILS: 56 % (ref 36–65)
SEGMENTED NEUTROPHILS ABSOLUTE COUNT: 3.82 K/UL (ref 1.5–8.1)
SODIUM BLD-SCNC: 143 MMOL/L (ref 135–144)
TRIGL SERPL-MCNC: 96 MG/DL
VLDLC SERPL CALC-MCNC: ABNORMAL MG/DL (ref 1–30)
WBC # BLD: 6.9 K/UL (ref 3.5–11.3)
WBC # BLD: ABNORMAL 10*3/UL

## 2019-10-23 PROCEDURE — 80061 LIPID PANEL: CPT

## 2019-10-23 PROCEDURE — 99213 OFFICE O/P EST LOW 20 MIN: CPT | Performed by: STUDENT IN AN ORGANIZED HEALTH CARE EDUCATION/TRAINING PROGRAM

## 2019-10-23 PROCEDURE — 1036F TOBACCO NON-USER: CPT | Performed by: STUDENT IN AN ORGANIZED HEALTH CARE EDUCATION/TRAINING PROGRAM

## 2019-10-23 PROCEDURE — G8482 FLU IMMUNIZE ORDER/ADMIN: HCPCS | Performed by: STUDENT IN AN ORGANIZED HEALTH CARE EDUCATION/TRAINING PROGRAM

## 2019-10-23 PROCEDURE — 4040F PNEUMOC VAC/ADMIN/RCVD: CPT | Performed by: STUDENT IN AN ORGANIZED HEALTH CARE EDUCATION/TRAINING PROGRAM

## 2019-10-23 PROCEDURE — 85025 COMPLETE CBC W/AUTO DIFF WBC: CPT

## 2019-10-23 PROCEDURE — 3017F COLORECTAL CA SCREEN DOC REV: CPT | Performed by: STUDENT IN AN ORGANIZED HEALTH CARE EDUCATION/TRAINING PROGRAM

## 2019-10-23 PROCEDURE — G8399 PT W/DXA RESULTS DOCUMENT: HCPCS | Performed by: STUDENT IN AN ORGANIZED HEALTH CARE EDUCATION/TRAINING PROGRAM

## 2019-10-23 PROCEDURE — 83036 HEMOGLOBIN GLYCOSYLATED A1C: CPT | Performed by: STUDENT IN AN ORGANIZED HEALTH CARE EDUCATION/TRAINING PROGRAM

## 2019-10-23 PROCEDURE — 90688 IIV4 VACCINE SPLT 0.5 ML IM: CPT | Performed by: STUDENT IN AN ORGANIZED HEALTH CARE EDUCATION/TRAINING PROGRAM

## 2019-10-23 PROCEDURE — 36415 COLL VENOUS BLD VENIPUNCTURE: CPT

## 2019-10-23 PROCEDURE — G8417 CALC BMI ABV UP PARAM F/U: HCPCS | Performed by: STUDENT IN AN ORGANIZED HEALTH CARE EDUCATION/TRAINING PROGRAM

## 2019-10-23 PROCEDURE — 2022F DILAT RTA XM EVC RTNOPTHY: CPT | Performed by: STUDENT IN AN ORGANIZED HEALTH CARE EDUCATION/TRAINING PROGRAM

## 2019-10-23 PROCEDURE — G8427 DOCREV CUR MEDS BY ELIG CLIN: HCPCS | Performed by: STUDENT IN AN ORGANIZED HEALTH CARE EDUCATION/TRAINING PROGRAM

## 2019-10-23 PROCEDURE — 99211 OFF/OP EST MAY X REQ PHY/QHP: CPT | Performed by: INTERNAL MEDICINE

## 2019-10-23 PROCEDURE — 1090F PRES/ABSN URINE INCON ASSESS: CPT | Performed by: STUDENT IN AN ORGANIZED HEALTH CARE EDUCATION/TRAINING PROGRAM

## 2019-10-23 PROCEDURE — 80048 BASIC METABOLIC PNL TOTAL CA: CPT

## 2019-10-23 PROCEDURE — 1123F ACP DISCUSS/DSCN MKR DOCD: CPT | Performed by: STUDENT IN AN ORGANIZED HEALTH CARE EDUCATION/TRAINING PROGRAM

## 2019-10-23 RX ORDER — DILTIAZEM HYDROCHLORIDE 120 MG/1
120 CAPSULE, COATED, EXTENDED RELEASE ORAL DAILY
Qty: 30 CAPSULE | Refills: 3 | Status: SHIPPED | OUTPATIENT
Start: 2019-10-23 | End: 2020-01-08 | Stop reason: SDUPTHER

## 2019-10-23 RX ORDER — GABAPENTIN 300 MG/1
300 CAPSULE ORAL 3 TIMES DAILY
Qty: 90 CAPSULE | Refills: 0 | Status: SHIPPED | OUTPATIENT
Start: 2019-10-23 | End: 2019-11-20 | Stop reason: SDUPTHER

## 2019-10-23 RX ORDER — TRAMADOL HYDROCHLORIDE 50 MG/1
50 TABLET ORAL PRN
COMMUNITY
Start: 2017-03-31 | End: 2020-04-22 | Stop reason: SDUPTHER

## 2019-10-23 RX ORDER — ANTIOX #8/OM3/DHA/EPA/LUT/ZEAX 250-2.5 MG
1 CAPSULE ORAL 2 TIMES DAILY
COMMUNITY
Start: 2019-07-18 | End: 2019-10-23 | Stop reason: SDUPTHER

## 2019-10-23 RX ORDER — PEN NEEDLE, DIABETIC 32GX 5/32"
NEEDLE, DISPOSABLE MISCELLANEOUS
Qty: 100 EACH | Refills: 3 | Status: SHIPPED | OUTPATIENT
Start: 2019-10-23 | End: 2020-01-08 | Stop reason: SDUPTHER

## 2019-10-23 RX ORDER — LISINOPRIL 10 MG/1
10 TABLET ORAL DAILY
Qty: 30 TABLET | Refills: 3 | Status: SHIPPED | OUTPATIENT
Start: 2019-10-23 | End: 2019-11-20 | Stop reason: ALTCHOICE

## 2019-10-23 RX ORDER — TRAMADOL HYDROCHLORIDE 50 MG/1
50 TABLET ORAL PRN
Qty: 30 TABLET | Refills: 0 | Status: CANCELLED | OUTPATIENT
Start: 2019-10-23 | End: 2019-11-22

## 2019-10-23 RX ORDER — ASPIRIN 81 MG/1
TABLET ORAL
Qty: 30 TABLET | Refills: 3 | Status: CANCELLED | OUTPATIENT
Start: 2019-10-23

## 2019-10-23 RX ORDER — PNV NO.95/FERROUS FUM/FOLIC AC 28MG-0.8MG
325 TABLET ORAL
Qty: 30 TABLET | Refills: 3 | Status: SHIPPED | OUTPATIENT
Start: 2019-10-23 | End: 2020-01-08 | Stop reason: SDUPTHER

## 2019-10-23 RX ORDER — ANTIOX #8/OM3/DHA/EPA/LUT/ZEAX 250-2.5 MG
1 CAPSULE ORAL 2 TIMES DAILY
Qty: 30 CAPSULE | Refills: 3 | Status: SHIPPED | OUTPATIENT
Start: 2019-10-23 | End: 2020-01-08 | Stop reason: SDUPTHER

## 2019-10-23 RX ORDER — ANTIOX #8/OM3/DHA/EPA/LUT/ZEAX 250-2.5 MG
2 CAPSULE ORAL DAILY
Qty: 60 CAPSULE | Refills: 3 | Status: SHIPPED | OUTPATIENT
Start: 2019-10-23 | End: 2020-01-08 | Stop reason: SDUPTHER

## 2019-10-23 RX ORDER — PANTOPRAZOLE SODIUM 40 MG/1
40 TABLET, DELAYED RELEASE ORAL DAILY
Qty: 30 TABLET | Refills: 3 | Status: SHIPPED | OUTPATIENT
Start: 2019-10-23 | End: 2020-01-08 | Stop reason: SDUPTHER

## 2019-10-23 RX ORDER — LOVASTATIN 20 MG/1
20 TABLET ORAL NIGHTLY
Qty: 30 TABLET | Refills: 3 | Status: SHIPPED | OUTPATIENT
Start: 2019-10-23 | End: 2020-01-08 | Stop reason: SDUPTHER

## 2019-10-23 RX ORDER — INSULIN GLARGINE 100 [IU]/ML
50 INJECTION, SOLUTION SUBCUTANEOUS NIGHTLY
Qty: 50 ML | Refills: 3 | Status: SHIPPED | OUTPATIENT
Start: 2019-10-23 | End: 2020-01-08 | Stop reason: SDUPTHER

## 2019-10-23 RX ORDER — BLOOD SUGAR DIAGNOSTIC
STRIP MISCELLANEOUS
Qty: 100 EACH | Refills: 0 | Status: SHIPPED | OUTPATIENT
Start: 2019-10-23 | End: 2020-01-08 | Stop reason: SDUPTHER

## 2019-10-23 ASSESSMENT — ENCOUNTER SYMPTOMS
BLOOD IN STOOL: 0
CONSTIPATION: 0
EYE PAIN: 0
ABDOMINAL PAIN: 0
WHEEZING: 0
DIARRHEA: 0
COLOR CHANGE: 0
EYE DISCHARGE: 0
EYE ITCHING: 0
BACK PAIN: 1
VOMITING: 0
COUGH: 0
SINUS PAIN: 0
RHINORRHEA: 0
PHOTOPHOBIA: 0
ABDOMINAL DISTENTION: 0
SHORTNESS OF BREATH: 0
CHEST TIGHTNESS: 0
CHOKING: 0
SORE THROAT: 0
NAUSEA: 0
SINUS PRESSURE: 0

## 2019-11-07 LAB — HBA1C MFR BLD: 7.6 %

## 2019-11-20 ENCOUNTER — OFFICE VISIT (OUTPATIENT)
Dept: INTERNAL MEDICINE | Age: 69
End: 2019-11-20
Payer: MEDICAID

## 2019-11-20 VITALS
WEIGHT: 200 LBS | BODY MASS INDEX: 33.32 KG/M2 | HEART RATE: 86 BPM | HEIGHT: 65 IN | DIASTOLIC BLOOD PRESSURE: 98 MMHG | SYSTOLIC BLOOD PRESSURE: 166 MMHG

## 2019-11-20 DIAGNOSIS — I48.92 ATRIAL FLUTTER, UNSPECIFIED TYPE (HCC): ICD-10-CM

## 2019-11-20 DIAGNOSIS — Z79.4 TYPE 2 DIABETES MELLITUS WITHOUT COMPLICATION, WITH LONG-TERM CURRENT USE OF INSULIN (HCC): Primary | ICD-10-CM

## 2019-11-20 DIAGNOSIS — E11.9 TYPE 2 DIABETES MELLITUS WITHOUT COMPLICATION, WITH LONG-TERM CURRENT USE OF INSULIN (HCC): Primary | ICD-10-CM

## 2019-11-20 DIAGNOSIS — I10 ESSENTIAL HYPERTENSION: ICD-10-CM

## 2019-11-20 DIAGNOSIS — H93.8X1 EAR MASS, RIGHT: ICD-10-CM

## 2019-11-20 DIAGNOSIS — M47.816 OSTEOARTHRITIS OF LUMBAR SPINE, UNSPECIFIED SPINAL OSTEOARTHRITIS COMPLICATION STATUS: ICD-10-CM

## 2019-11-20 DIAGNOSIS — M19.91 PRIMARY OSTEOARTHRITIS, UNSPECIFIED SITE: ICD-10-CM

## 2019-11-20 DIAGNOSIS — Z23 NEEDS FLU SHOT: ICD-10-CM

## 2019-11-20 PROCEDURE — 1123F ACP DISCUSS/DSCN MKR DOCD: CPT | Performed by: STUDENT IN AN ORGANIZED HEALTH CARE EDUCATION/TRAINING PROGRAM

## 2019-11-20 PROCEDURE — 2022F DILAT RTA XM EVC RTNOPTHY: CPT | Performed by: STUDENT IN AN ORGANIZED HEALTH CARE EDUCATION/TRAINING PROGRAM

## 2019-11-20 PROCEDURE — 1090F PRES/ABSN URINE INCON ASSESS: CPT | Performed by: STUDENT IN AN ORGANIZED HEALTH CARE EDUCATION/TRAINING PROGRAM

## 2019-11-20 PROCEDURE — G8482 FLU IMMUNIZE ORDER/ADMIN: HCPCS | Performed by: STUDENT IN AN ORGANIZED HEALTH CARE EDUCATION/TRAINING PROGRAM

## 2019-11-20 PROCEDURE — G8417 CALC BMI ABV UP PARAM F/U: HCPCS | Performed by: STUDENT IN AN ORGANIZED HEALTH CARE EDUCATION/TRAINING PROGRAM

## 2019-11-20 PROCEDURE — 99213 OFFICE O/P EST LOW 20 MIN: CPT | Performed by: STUDENT IN AN ORGANIZED HEALTH CARE EDUCATION/TRAINING PROGRAM

## 2019-11-20 PROCEDURE — G8428 CUR MEDS NOT DOCUMENT: HCPCS | Performed by: STUDENT IN AN ORGANIZED HEALTH CARE EDUCATION/TRAINING PROGRAM

## 2019-11-20 PROCEDURE — 3051F HG A1C>EQUAL 7.0%<8.0%: CPT | Performed by: STUDENT IN AN ORGANIZED HEALTH CARE EDUCATION/TRAINING PROGRAM

## 2019-11-20 PROCEDURE — 99211 OFF/OP EST MAY X REQ PHY/QHP: CPT | Performed by: INTERNAL MEDICINE

## 2019-11-20 PROCEDURE — G8399 PT W/DXA RESULTS DOCUMENT: HCPCS | Performed by: STUDENT IN AN ORGANIZED HEALTH CARE EDUCATION/TRAINING PROGRAM

## 2019-11-20 PROCEDURE — 4040F PNEUMOC VAC/ADMIN/RCVD: CPT | Performed by: STUDENT IN AN ORGANIZED HEALTH CARE EDUCATION/TRAINING PROGRAM

## 2019-11-20 PROCEDURE — 3017F COLORECTAL CA SCREEN DOC REV: CPT | Performed by: STUDENT IN AN ORGANIZED HEALTH CARE EDUCATION/TRAINING PROGRAM

## 2019-11-20 PROCEDURE — 1036F TOBACCO NON-USER: CPT | Performed by: STUDENT IN AN ORGANIZED HEALTH CARE EDUCATION/TRAINING PROGRAM

## 2019-11-20 RX ORDER — GABAPENTIN 300 MG/1
300 CAPSULE ORAL 3 TIMES DAILY
Qty: 270 CAPSULE | Refills: 3 | Status: SHIPPED | OUTPATIENT
Start: 2019-11-20 | End: 2020-01-08 | Stop reason: SDUPTHER

## 2019-11-20 RX ORDER — ASPIRIN 81 MG/1
81 TABLET ORAL DAILY
Qty: 90 TABLET | Refills: 1 | Status: CANCELLED | OUTPATIENT
Start: 2019-11-20

## 2019-11-20 RX ORDER — LISINOPRIL 20 MG/1
20 TABLET ORAL DAILY
Qty: 30 TABLET | Refills: 3 | Status: SHIPPED | OUTPATIENT
Start: 2019-11-20 | End: 2020-01-08 | Stop reason: SDUPTHER

## 2019-12-30 NOTE — TELEPHONE ENCOUNTER
Escrichip request for pt request for all her medication to be refill, pt request 13 refill    Next Visit Date:  No future appointments. Health Maintenance   Topic Date Due    Diabetic retinal exam  06/19/2016    Colon cancer screen colonoscopy  08/08/2019    Diabetic foot exam  09/11/2019    Diabetic microalbuminuria test  12/13/2019    Lipid screen  10/23/2020    Potassium monitoring  10/23/2020    Creatinine monitoring  10/23/2020    A1C test (Diabetic or Prediabetic)  11/07/2020    Breast cancer screen  10/16/2021    DTaP/Tdap/Td vaccine (2 - Td) 02/01/2023    DEXA (modify frequency per FRAX score)  Completed    Flu vaccine  Completed    Shingles Vaccine  Completed    Pneumococcal 65+ years Vaccine  Completed    Hepatitis C screen  Completed             (applicable per patient's age: Cancer Screenings, Depression Screening, Fall Risk Screening, Immunizations)    Hemoglobin A1C (%)   Date Value   11/07/2019 7.6   06/24/2019 7.3   09/11/2018 7.1     Microalb/Crt.  Ratio (mcg/mg creat)   Date Value   12/13/2018 20     LDL Cholesterol (mg/dL)   Date Value   10/23/2019 107     AST (U/L)   Date Value   12/13/2018 19     ALT (U/L)   Date Value   12/13/2018 16     BUN (mg/dL)   Date Value   10/23/2019 8      (goal A1C is < 7)   (goal LDL is <100) need 30-50% reduction from baseline     BP Readings from Last 3 Encounters:   11/20/19 (!) 166/98   10/23/19 (!) 152/82   06/24/19 138/71    (goal /80)      All Future Testing planned in [facility]:  Lab Frequency Next Occurrence   NANDINI DIGITAL SCREEN SELF REFERRAL W OR WO CAD BILATERAL Once 01/09/2020   CBC With Auto Differential Once 09/12/3294   Basic Metabolic Panel Once 26/85/6827            Patient Active Problem List:     Fibroid     Lung nodule     Leiomyomatosis     Restrictive lung disease     Radiculopathy, cervical     Renal mass     DR (diabetic retinopathy) ([address]     HTN (hypertension)     DM (diabetes mellitus) ([address]     Dermatomyositis ([address] OA (osteoarthritis)     Hyperlipidemia     Anxiety and depression     Adnexal mass     Pelvic mass     Thyroid nodule

## 2019-12-30 NOTE — TELEPHONE ENCOUNTER
Elizabeth request for pt request medication refill, aspirin    Next Visit Date:  No future appointments. Health Maintenance   Topic Date Due    Diabetic retinal exam  06/19/2016    Colon cancer screen colonoscopy  08/08/2019    Diabetic foot exam  09/11/2019    Diabetic microalbuminuria test  12/13/2019    Lipid screen  10/23/2020    Potassium monitoring  10/23/2020    Creatinine monitoring  10/23/2020    A1C test (Diabetic or Prediabetic)  11/07/2020    Breast cancer screen  10/16/2021    DTaP/Tdap/Td vaccine (2 - Td) 02/01/2023    DEXA (modify frequency per FRAX score)  Completed    Flu vaccine  Completed    Shingles Vaccine  Completed    Pneumococcal 65+ years Vaccine  Completed    Hepatitis C screen  Completed             (applicable per patient's age: Cancer Screenings, Depression Screening, Fall Risk Screening, Immunizations)    Hemoglobin A1C (%)   Date Value   11/07/2019 7.6   06/24/2019 7.3   09/11/2018 7.1     Microalb/Crt.  Ratio (mcg/mg creat)   Date Value   12/13/2018 20     LDL Cholesterol (mg/dL)   Date Value   10/23/2019 107     AST (U/L)   Date Value   12/13/2018 19     ALT (U/L)   Date Value   12/13/2018 16     BUN (mg/dL)   Date Value   10/23/2019 8      (goal A1C is < 7)   (goal LDL is <100) need 30-50% reduction from baseline     BP Readings from Last 3 Encounters:   11/20/19 (!) 166/98   10/23/19 (!) 152/82   06/24/19 138/71    (goal /80)      All Future Testing planned in [facility]:  Lab Frequency Next Occurrence   NANDINI DIGITAL SCREEN SELF REFERRAL W OR WO CAD BILATERAL Once 01/09/2020   CBC With Auto Differential Once 77/53/5618   Basic Metabolic Panel Once 85/54/5420            Patient Active Problem List:     Fibroid     Lung nodule     Leiomyomatosis     Restrictive lung disease     Radiculopathy, cervical     Renal mass     DR (diabetic retinopathy) ([address]     HTN (hypertension)     DM (diabetes mellitus) ([facility] [address]     Dermatomyositis (HCC)     OA (osteoarthritis)

## 2020-01-08 RX ORDER — ANTIOX #8/OM3/DHA/EPA/LUT/ZEAX 250-2.5 MG
1 CAPSULE ORAL 2 TIMES DAILY
Qty: 30 CAPSULE | Refills: 3 | Status: SHIPPED | OUTPATIENT
Start: 2020-01-08 | End: 2020-07-06

## 2020-01-08 RX ORDER — PANTOPRAZOLE SODIUM 40 MG/1
40 TABLET, DELAYED RELEASE ORAL DAILY
Qty: 30 TABLET | Refills: 3 | Status: SHIPPED | OUTPATIENT
Start: 2020-01-08 | End: 2020-04-22 | Stop reason: SDUPTHER

## 2020-01-08 RX ORDER — LOVASTATIN 20 MG/1
20 TABLET ORAL NIGHTLY
Qty: 30 TABLET | Refills: 3 | Status: SHIPPED | OUTPATIENT
Start: 2020-01-08 | End: 2020-04-22 | Stop reason: SDUPTHER

## 2020-01-08 RX ORDER — ASPIRIN 81 MG/1
TABLET ORAL
Qty: 30 TABLET | Refills: 3 | Status: SHIPPED | OUTPATIENT
Start: 2020-01-08 | End: 2020-04-22 | Stop reason: ALTCHOICE

## 2020-01-08 RX ORDER — ANTIOX #8/OM3/DHA/EPA/LUT/ZEAX 250-2.5 MG
2 CAPSULE ORAL DAILY
Qty: 60 CAPSULE | Refills: 3 | Status: SHIPPED | OUTPATIENT
Start: 2020-01-08 | End: 2020-04-22 | Stop reason: SDUPTHER

## 2020-01-08 RX ORDER — PNV NO.95/FERROUS FUM/FOLIC AC 28MG-0.8MG
325 TABLET ORAL
Qty: 30 TABLET | Refills: 3 | Status: SHIPPED | OUTPATIENT
Start: 2020-01-08 | End: 2020-04-22 | Stop reason: SDUPTHER

## 2020-01-08 RX ORDER — LISINOPRIL 20 MG/1
20 TABLET ORAL DAILY
Qty: 30 TABLET | Refills: 3 | Status: SHIPPED | OUTPATIENT
Start: 2020-01-08 | End: 2020-04-22 | Stop reason: SDUPTHER

## 2020-01-08 RX ORDER — PEN NEEDLE, DIABETIC 32GX 5/32"
NEEDLE, DISPOSABLE MISCELLANEOUS
Qty: 100 EACH | Refills: 3 | Status: SHIPPED | OUTPATIENT
Start: 2020-01-08 | End: 2020-04-22 | Stop reason: SDUPTHER

## 2020-01-08 RX ORDER — GABAPENTIN 300 MG/1
300 CAPSULE ORAL 3 TIMES DAILY
Qty: 270 CAPSULE | Refills: 3 | Status: SHIPPED | OUTPATIENT
Start: 2020-01-08 | End: 2020-04-22 | Stop reason: SDUPTHER

## 2020-01-08 RX ORDER — INSULIN GLARGINE 100 [IU]/ML
50 INJECTION, SOLUTION SUBCUTANEOUS NIGHTLY
Qty: 50 ML | Refills: 3 | Status: SHIPPED | OUTPATIENT
Start: 2020-01-08 | End: 2020-04-22 | Stop reason: SDUPTHER

## 2020-01-08 RX ORDER — DILTIAZEM HYDROCHLORIDE 120 MG/1
120 CAPSULE, COATED, EXTENDED RELEASE ORAL DAILY
Qty: 30 CAPSULE | Refills: 3 | Status: SHIPPED | OUTPATIENT
Start: 2020-01-08 | End: 2020-03-03

## 2020-01-08 RX ORDER — BLOOD SUGAR DIAGNOSTIC
STRIP MISCELLANEOUS
Qty: 100 EACH | Refills: 0 | Status: SHIPPED | OUTPATIENT
Start: 2020-01-08 | End: 2020-07-06 | Stop reason: SDUPTHER

## 2020-03-03 RX ORDER — DILTIAZEM HYDROCHLORIDE 120 MG/1
120 CAPSULE, COATED, EXTENDED RELEASE ORAL DAILY
Qty: 30 CAPSULE | Refills: 3 | Status: SHIPPED | OUTPATIENT
Start: 2020-03-03 | End: 2020-06-30

## 2020-03-03 NOTE — TELEPHONE ENCOUNTER
Please have the patient call cardiology for appointment for atrial flutter, I am refilling this medication for now.

## 2020-04-22 ENCOUNTER — VIRTUAL VISIT (OUTPATIENT)
Dept: INTERNAL MEDICINE | Age: 70
End: 2020-04-22
Payer: MEDICAID

## 2020-04-22 PROCEDURE — 99443 PR PHYS/QHP TELEPHONE EVALUATION 21-30 MIN: CPT | Performed by: INTERNAL MEDICINE

## 2020-04-22 RX ORDER — LISINOPRIL 20 MG/1
20 TABLET ORAL DAILY
Qty: 30 TABLET | Refills: 11 | Status: SHIPPED | OUTPATIENT
Start: 2020-04-22 | End: 2022-08-09

## 2020-04-22 RX ORDER — ASPIRIN 81 MG/1
TABLET ORAL
Qty: 30 TABLET | Refills: 3 | Status: CANCELLED | OUTPATIENT
Start: 2020-04-22

## 2020-04-22 RX ORDER — PNV NO.95/FERROUS FUM/FOLIC AC 28MG-0.8MG
325 TABLET ORAL
Qty: 30 TABLET | Refills: 3 | Status: SHIPPED | OUTPATIENT
Start: 2020-04-22 | End: 2020-06-02 | Stop reason: SDUPTHER

## 2020-04-22 RX ORDER — GABAPENTIN 300 MG/1
300 CAPSULE ORAL 3 TIMES DAILY
Qty: 270 CAPSULE | Refills: 3 | Status: SHIPPED | OUTPATIENT
Start: 2020-04-22 | End: 2020-12-16 | Stop reason: SDUPTHER

## 2020-04-22 RX ORDER — PANTOPRAZOLE SODIUM 40 MG/1
40 TABLET, DELAYED RELEASE ORAL DAILY
Qty: 30 TABLET | Refills: 3 | Status: SHIPPED | OUTPATIENT
Start: 2020-04-22 | End: 2020-07-06 | Stop reason: SDUPTHER

## 2020-04-22 RX ORDER — PEN NEEDLE, DIABETIC 32GX 5/32"
NEEDLE, DISPOSABLE MISCELLANEOUS
Qty: 100 EACH | Refills: 3 | Status: SHIPPED | OUTPATIENT
Start: 2020-04-22 | End: 2020-12-09 | Stop reason: SDUPTHER

## 2020-04-22 RX ORDER — INSULIN GLARGINE 100 [IU]/ML
50 INJECTION, SOLUTION SUBCUTANEOUS NIGHTLY
Qty: 5 VIAL | Refills: 11 | Status: SHIPPED | OUTPATIENT
Start: 2020-04-22 | End: 2022-08-09

## 2020-04-22 RX ORDER — ANTIOX #8/OM3/DHA/EPA/LUT/ZEAX 250-2.5 MG
2 CAPSULE ORAL DAILY
Qty: 60 CAPSULE | Refills: 3 | Status: SHIPPED | OUTPATIENT
Start: 2020-04-22 | End: 2020-06-02 | Stop reason: SDUPTHER

## 2020-04-22 RX ORDER — LOVASTATIN 20 MG/1
20 TABLET ORAL NIGHTLY
Qty: 30 TABLET | Refills: 3 | Status: SHIPPED | OUTPATIENT
Start: 2020-04-22 | End: 2020-07-06 | Stop reason: SDUPTHER

## 2020-04-22 RX ORDER — TRAMADOL HYDROCHLORIDE 50 MG/1
50 TABLET ORAL EVERY 6 HOURS PRN
Qty: 40 TABLET | Refills: 0 | Status: SHIPPED | OUTPATIENT
Start: 2020-04-22 | End: 2020-05-22

## 2020-04-22 RX ORDER — ECHINACEA PURPUREA EXTRACT 125 MG
1 TABLET ORAL PRN
Qty: 1 BOTTLE | Refills: 3 | Status: SHIPPED | OUTPATIENT
Start: 2020-04-22 | End: 2020-07-06 | Stop reason: SDUPTHER

## 2020-04-22 NOTE — PROGRESS NOTES
is limited: Vitals/Constitutional/EENT/Resp/CV/GI//MS/Neuro/Skin/Heme-Lymph-Imm. ASSESSMENT/PLAN:  1. 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 Primary osteoarthritis of left shoulder    - gabapentin (NEURONTIN) 300 MG capsule; Take 1 capsule by mouth 3 times daily for 90 days. Dispense: 270 capsule; Refill: 3  - traMADol (ULTRAM) 50 MG tablet; Take 1 tablet by mouth every 6 hours as needed for Pain for up to 30 days. Dispense: 40 tablet; Refill: 0    3. Essential hypertension    - lisinopril (PRINIVIL;ZESTRIL) 20 MG tablet; Take 1 tablet by mouth daily Cancel Lisinopril 5 mg and 10 mg prescription  Dispense: 30 tablet; Refill: 11  - lovastatin (MEVACOR) 20 MG tablet; Take 1 tablet by mouth nightly  Dispense: 30 tablet; Refill: 3  - Multiple Vitamins-Minerals (PRESERVISION AREDS 2) CAPS; Take 2 capsules by mouth daily  Dispense: 60 capsule; Refill: 3  - pantoprazole (PROTONIX) 40 MG tablet; Take 1 tablet by mouth daily  Dispense: 30 tablet; Refill: 3  - DME Order for (Specify) as OP    4. Anemia, unspecified type    - Ferrous Sulfate (IRON) 325 (65 Fe) MG TABS; Take 325 mg by mouth daily (with breakfast)  Dispense: 30 tablet; Refill: 3  - Iron and TIBC; Future  - CBC With Auto Differential; Future    5. Duodenal ulcer disease    - H. Pylori Antigen, EIA; Future    6. Pancolonic diverticulosis      7. Dry throat    - sodium chloride (ALTAMIST SPRAY) 0.65 % nasal spray; 1 spray by Nasal route as needed for Congestion  Dispense: 1 Bottle; Refill: 3      Plan :      Will

## 2020-04-29 ENCOUNTER — TELEPHONE (OUTPATIENT)
Dept: INTERNAL MEDICINE | Age: 70
End: 2020-04-29

## 2020-04-30 ENCOUNTER — TELEPHONE (OUTPATIENT)
Dept: INTERNAL MEDICINE | Age: 70
End: 2020-04-30

## 2020-04-30 NOTE — LETTER
[address]  [address] [address]  Phone: [phone]  Fax: [phone]    Denise Maravilla MD        April 30, 2020    Anna Marie Villanueva  [address] [place] [address]      Dear Anna Marie: We are sending this letter because your PCP ordered [facility] for you to have done at your last visit here and they have not yet been completed. If you can please come to our office on the 2nd floor to  your orders to have them compelted. If you do not have a follow-up appointment scheduled you can either contact the office to make an appointment with us or you can make one when you come in to pick-up your orders. If you have any questions or concerns, please don't hesitate to call.     Sincerely,        Denise Maravilla MD

## 2020-05-28 ENCOUNTER — TELEPHONE (OUTPATIENT)
Dept: INTERNAL MEDICINE | Age: 70
End: 2020-05-28

## 2020-06-01 ENCOUNTER — PATIENT MESSAGE (OUTPATIENT)
Dept: INTERNAL MEDICINE | Age: 70
End: 2020-06-01

## 2020-06-02 RX ORDER — ANTIOX #8/OM3/DHA/EPA/LUT/ZEAX 250-2.5 MG
2 CAPSULE ORAL DAILY
Qty: 60 CAPSULE | Refills: 3 | Status: SHIPPED | OUTPATIENT
Start: 2020-06-02 | End: 2020-12-16 | Stop reason: SDUPTHER

## 2020-06-02 RX ORDER — PNV NO.95/FERROUS FUM/FOLIC AC 28MG-0.8MG
325 TABLET ORAL
Qty: 30 TABLET | Refills: 3 | Status: SHIPPED | OUTPATIENT
Start: 2020-06-02 | End: 2021-08-02 | Stop reason: SDUPTHER

## 2020-06-30 RX ORDER — DILTIAZEM HYDROCHLORIDE 120 MG/1
120 CAPSULE, COATED, EXTENDED RELEASE ORAL DAILY
Qty: 30 CAPSULE | Refills: 3 | Status: SHIPPED | OUTPATIENT
Start: 2020-06-30 | End: 2021-08-18 | Stop reason: ALTCHOICE

## 2020-06-30 NOTE — TELEPHONE ENCOUNTER
Once 07/30/2020   Vitamin D 25 Hydroxy Once 07/30/2020   Phosphorus Once 07/01/2020   PTH-Related Peptide Once 07/30/2020   XR SHOULDER LEFT (MIN 2 VIEWS) Once 08/07/2020   Urinalysis Once 07/30/2020   Microalbumin, Ur Once 07/01/2020   TSH With Reflex Ft4 Once 07/30/2020       Next Visit Date:  Future Appointments   Date Time Provider Sigrid Begum   7/6/2020 11:00 AM Lory Bragg MD [facility]            Patient Active Problem List:     Fibroid     Lung nodule     Leiomyomatosis     Restrictive lung disease     Radiculopathy, cervical     Renal mass     DR (diabetic retinopathy) ([address]     HTN (hypertension)     DM (diabetes mellitus) ([address]     Dermatomyositis (HCC)     OA (osteoarthritis)     Hyperlipidemia     Anxiety and depression     Adnexal mass     Pelvic mass     Thyroid nodule

## 2020-07-06 ENCOUNTER — VIRTUAL VISIT (OUTPATIENT)
Dept: INTERNAL MEDICINE | Age: 70
End: 2020-07-06
Payer: MEDICAID

## 2020-07-06 PROCEDURE — 99214 OFFICE O/P EST MOD 30 MIN: CPT | Performed by: INTERNAL MEDICINE

## 2020-07-06 RX ORDER — TRAMADOL HYDROCHLORIDE 50 MG/1
50 TABLET ORAL PRN
COMMUNITY
Start: 2020-04-22 | End: 2022-08-09

## 2020-07-06 RX ORDER — PANTOPRAZOLE SODIUM 40 MG/1
40 TABLET, DELAYED RELEASE ORAL DAILY
Qty: 30 TABLET | Refills: 3 | Status: SHIPPED | OUTPATIENT
Start: 2020-07-06 | End: 2020-12-09

## 2020-07-06 RX ORDER — LOVASTATIN 20 MG/1
20 TABLET ORAL NIGHTLY
Qty: 30 TABLET | Refills: 3 | Status: SHIPPED | OUTPATIENT
Start: 2020-07-06 | End: 2020-12-09

## 2020-07-06 RX ORDER — BLOOD SUGAR DIAGNOSTIC
STRIP MISCELLANEOUS
Qty: 100 EACH | Refills: 0 | Status: SHIPPED | OUTPATIENT
Start: 2020-07-06 | End: 2020-12-09 | Stop reason: SDUPTHER

## 2020-07-06 RX ORDER — ECHINACEA PURPUREA EXTRACT 125 MG
1 TABLET ORAL PRN
Qty: 1 BOTTLE | Refills: 3 | Status: SHIPPED | OUTPATIENT
Start: 2020-07-06 | End: 2020-12-09 | Stop reason: SDUPTHER

## 2020-07-06 NOTE — PATIENT INSTRUCTIONS
Medications e-scribe to pharmacy of pt's choice. Order for CT, [address] faxed to [address] they will call pt for appt. Please call [phone] in not heard within 2 weeks. An After Visit Summary was printed and mailed to the patient.   CB

## 2020-07-06 NOTE — PROGRESS NOTES
0.65 % nasal spray 1 spray by Nasal route as needed for Congestion Yes Sam Regalado MD   Incontinence Supply Disposable (CERTAINTY OVERNIGHT UNDERWEAR) MISC 1 box by Does not apply route 3 times daily Yes Sam Regalado MD   dilTIAZem (CARDIZEM CD) 120 MG extended release capsule TAKE 1 CAPSULE BY MOUTH DAILY Yes Susan Oh MD   Ferrous Sulfate (IRON) 325 (65 Fe) MG TABS Take 325 mg by mouth daily (with breakfast) Yes Wallace Pompa MD   insulin glargine (LANTUS) 100 UNIT/ML injection vial Inject 50 Units into the skin nightly Yes Susan Oh MD   gabapentin (NEURONTIN) 300 MG capsule Take 1 capsule by mouth 3 times daily for 90 days. Yes Sam Regalado MD   lisinopril (PRINIVIL;ZESTRIL) 20 MG tablet Take 1 tablet by mouth daily Cancel Lisinopril 5 mg and 10 mg prescription Yes Sam Regalado MD   Leetta Bras USE AS DIRECTED THREE TIMES A DAY Yes Wallace Pompa MD   Multiple Vitamins-Minerals (PRESERVISION AREDS 2) CAPS Take 2 capsules by mouth daily  Patient not taking: Reported on 7/6/2020  Wallace Pompa MD   blood glucose test strips (ONE TOUCH ULTRA TEST) strip TEST BLOOD SUGAR THREE TIMES A DAY AS NEEDED  Sam Regalado MD   Alcohol Swabs (PRO COMFORT ALCOHOL) 70 % PADS USE AS DIRECTED THREE TIMES A DAY  Sam Regalado MD       Social History     Tobacco Use    Smoking status: Never Smoker    Smokeless tobacco: Never Used   Substance Use Topics    Alcohol use: No    Drug use: No            RECORD REVIEW: Previous medical records were reviewed at today's visit.     PHYSICAL EXAMINATION:    Vital Signs: (As obtained by patient/caregiver at home)      Constitutional: [] Appears well-developed and well-nourished [] No apparent distress      [] Abnormal   Mental status  [x] Alert and awake  [] Oriented to person/place/time []Able to follow commands        Pulmonary/Chest: [x] Respiratory effort normal.  [] No visualized signs of difficulty breathing or respiratory authority and the [employer] Act, this Virtual  Visit was conducted, with patient's consent, to reduce the patient's risk of exposure to COVID-19 and provide continuity of care for an established patient. Services were provided through a telephone discussion virtually to substitute for in-person clinic visit.

## 2020-08-07 ENCOUNTER — TELEPHONE (OUTPATIENT)
Dept: INTERNAL MEDICINE | Age: 70
End: 2020-08-07

## 2020-10-15 ENCOUNTER — TELEPHONE (OUTPATIENT)
Dept: INTERNAL MEDICINE | Age: 70
End: 2020-10-15

## 2020-10-15 NOTE — LETTER
[address]  [address] [address]  Phone: [phone]  Fax: [phone]    Chary Hughes MD        October 15, 2020    Anna Marie JULIA Villanueva  [address] [place], [place] [address] [address]      Dear Anna Marie: We are sending this letter because your PCP ordered [facility] for you to have done at your last visit here and they have not yet been completed. If you can please come to our office on the 2nd floor to  your orders to have them compelted. If you do not have a follow-up appointment scheduled you can either contact the office to make an appointment with us or you can make one when you come in to pick-up your orders. If you have any questions or concerns, please don't hesitate to call.     Sincerely,        Chary Hughes MD

## 2020-12-08 NOTE — TELEPHONE ENCOUNTER
E-scribing request for medications. Pt has future appt. Health Maintenance   Topic Date Due    Diabetic retinal exam  06/19/2016    Colon cancer screen colonoscopy  08/08/2019    Diabetic foot exam  09/11/2019    Diabetic microalbuminuria test  12/13/2019    Flu vaccine (1) 09/01/2020    Lipid screen  10/23/2020    Potassium monitoring  10/23/2020    Creatinine monitoring  10/23/2020    A1C test (Diabetic or Prediabetic)  11/07/2020    Breast cancer screen  10/16/2021    DTaP/Tdap/Td vaccine (2 - Td) 02/01/2023    DEXA (modify frequency per FRAX score)  Completed    Shingles Vaccine  Completed    Pneumococcal 65+ years Vaccine  Completed    Hepatitis C screen  Completed    Hepatitis A vaccine  Aged Out    Hib vaccine  Aged Out    Meningococcal (ACWY) vaccine  Aged Out             (applicable per patient's age: Cancer Screenings, Depression Screening, Fall Risk Screening, Immunizations)    Hemoglobin A1C (%)   Date Value   11/07/2019 7.6   06/24/2019 7.3   09/11/2018 7.1     Microalb/Crt. Ratio (mcg/mg creat)   Date Value   12/13/2018 20     LDL Cholesterol (mg/dL)   Date Value   10/23/2019 107     AST (U/L)   Date Value   12/13/2018 19     ALT (U/L)   Date Value   12/13/2018 16     BUN (mg/dL)   Date Value   10/23/2019 8      (goal A1C is < 7)   (goal LDL is <100) need 30-50% reduction from baseline     BP Readings from Last 3 Encounters:   11/20/19 (!) 166/98   10/23/19 (!) 152/82   06/24/19 138/71    (goal /80)      All Future Testing planned in [facility]:  Lab Frequency Next Occurrence   Basic Metabolic Panel Once 90/23/4767   Iron and TIBC Once 12/18/2020   CBC With Auto Differential Once 12/15/2020   Comprehensive Metabolic Panel Once 92/70/3449   H.  Pylori Antigen, EIA Once 12/15/2020   US Head Neck Soft Tissue Thyroid Once 12/23/2020   PTH, Intact With Ionized Calcium Once 12/15/2020   Vitamin D 25 Hydroxy Once 12/15/2020   Phosphorus Once 12/18/2020   PTH-Related Peptide Once 12/15/2020   XR SHOULDER LEFT (MIN 2 VIEWS) Once 04/22/2021   Urinalysis Once 12/15/2020   Microalbumin, Ur Once 12/18/2020   TSH With Reflex Ft4 Once 12/15/2020   CT ABDOMEN WO CONTRAST Additional Contrast? Radiologist Recommendation Once 02/05/2021   CT PELVIS WO CONTRAST Additional Contrast? Radiologist Recommendation Once 02/05/2021   CT CHEST W WO CONTRAST Once 02/05/2021   US THYROID Once 02/05/2021       Next Visit Date:  Future Appointments   Date Time Provider Sigrid Begum   1/20/2021  1:00 PM Audrey Astudillo MD [facility]            Patient Active Problem List:     Fibroid     Lung nodule     Leiomyomatosis     Restrictive lung disease     Radiculopathy, cervical     Renal mass     DR (diabetic retinopathy) ([address]     HTN (hypertension)     DM (diabetes mellitus) ([address]     Dermatomyositis ([address]     OA (osteoarthritis)     Hyperlipidemia     Anxiety and depression     Adnexal mass     Pelvic mass     Thyroid nodule

## 2020-12-09 RX ORDER — LANCETS 30 GAUGE
1 EACH MISCELLANEOUS DAILY
Qty: 100 EACH | Refills: 11 | Status: SHIPPED | OUTPATIENT
Start: 2020-12-09 | End: 2021-11-09 | Stop reason: SDUPTHER

## 2020-12-09 RX ORDER — GLUCOSAMINE HCL/CHONDROITIN SU 500-400 MG
1 CAPSULE ORAL 3 TIMES DAILY
Qty: 100 STRIP | Refills: 11 | Status: SHIPPED | OUTPATIENT
Start: 2020-12-09 | End: 2021-11-09 | Stop reason: SDUPTHER

## 2020-12-09 RX ORDER — BLOOD SUGAR DIAGNOSTIC
STRIP MISCELLANEOUS
Qty: 100 EACH | Refills: 0 | Status: SHIPPED | OUTPATIENT
Start: 2020-12-09 | End: 2021-11-09 | Stop reason: SDUPTHER

## 2020-12-09 RX ORDER — LOVASTATIN 20 MG/1
20 TABLET ORAL NIGHTLY
Qty: 30 TABLET | Refills: 3 | Status: SHIPPED | OUTPATIENT
Start: 2020-12-09 | End: 2021-04-08 | Stop reason: SDUPTHER

## 2020-12-09 RX ORDER — ECHINACEA PURPUREA EXTRACT 125 MG
1 TABLET ORAL PRN
Qty: 1 BOTTLE | Refills: 3 | Status: SHIPPED | OUTPATIENT
Start: 2020-12-09 | End: 2021-11-09 | Stop reason: SDUPTHER

## 2020-12-09 RX ORDER — PANTOPRAZOLE SODIUM 40 MG/1
40 TABLET, DELAYED RELEASE ORAL DAILY
Qty: 30 TABLET | Refills: 3 | Status: SHIPPED | OUTPATIENT
Start: 2020-12-09 | End: 2021-04-08 | Stop reason: SDUPTHER

## 2020-12-09 RX ORDER — PEN NEEDLE, DIABETIC 32GX 5/32"
NEEDLE, DISPOSABLE MISCELLANEOUS
Qty: 100 EACH | Refills: 3 | Status: SHIPPED | OUTPATIENT
Start: 2020-12-09 | End: 2021-11-09 | Stop reason: SDUPTHER

## 2020-12-16 DIAGNOSIS — I10 ESSENTIAL HYPERTENSION: ICD-10-CM

## 2020-12-16 DIAGNOSIS — M19.012 PRIMARY OSTEOARTHRITIS OF LEFT SHOULDER: ICD-10-CM

## 2020-12-16 RX ORDER — GABAPENTIN 300 MG/1
300 CAPSULE ORAL 3 TIMES DAILY
Qty: 270 CAPSULE | Refills: 3 | Status: SHIPPED | OUTPATIENT
Start: 2020-12-16 | End: 2021-04-08 | Stop reason: SDUPTHER

## 2020-12-16 RX ORDER — ANTIOX #8/OM3/DHA/EPA/LUT/ZEAX 250-2.5 MG
2 CAPSULE ORAL DAILY
Qty: 60 CAPSULE | Refills: 3 | Status: SHIPPED | OUTPATIENT
Start: 2020-12-16 | End: 2021-04-08 | Stop reason: SDUPTHER

## 2020-12-16 NOTE — TELEPHONE ENCOUNTER
Request for Gabapentin and Preservision . Next Visit Date:  Future Appointments   Date Time Provider Sigrid Shy   1/20/2021  1:00 PM General Opal MD [facility]   Topic Date Due    Diabetic retinal exam  06/19/2016    Colon cancer screen colonoscopy  08/08/2019    Diabetic foot exam  09/11/2019    Diabetic microalbuminuria test  12/13/2019    Flu vaccine (1) 09/01/2020    Lipid screen  10/23/2020    Potassium monitoring  10/23/2020    Creatinine monitoring  10/23/2020    A1C test (Diabetic or Prediabetic)  11/07/2020    Breast cancer screen  10/16/2021    DTaP/Tdap/Td vaccine (2 - Td) 02/01/2023    DEXA (modify frequency per FRAX score)  Completed    Shingles Vaccine  Completed    Pneumococcal 65+ years Vaccine  Completed    Hepatitis C screen  Completed    Hepatitis A vaccine  Aged Out    Hib vaccine  Aged Out    Meningococcal (ACWY) vaccine  Aged Out       Hemoglobin A1C (%)   Date Value   11/07/2019 7.6   06/24/2019 7.3   09/11/2018 7.1             ( goal A1C is < 7)   Microalb/Crt. Ratio (mcg/mg creat)   Date Value   12/13/2018 20     LDL Cholesterol (mg/dL)   Date Value   10/23/2019 107       (goal LDL is <100)   AST (U/L)   Date Value   12/13/2018 19     ALT (U/L)   Date Value   12/13/2018 16     BUN (mg/dL)   Date Value   10/23/2019 8     BP Readings from Last 3 Encounters:   11/20/19 (!) 166/98   10/23/19 (!) 152/82   06/24/19 138/71          (goal 120/80)    All Future Testing planned in [facility]  Lab Frequency Next Occurrence   Basic Metabolic Panel Once 91/46/8358   Iron and TIBC Once 12/18/2020   CBC With Auto Differential Once 12/15/2020   Comprehensive Metabolic Panel Once 58/94/8895   H.  Pylori Antigen, EIA Once 12/15/2020   US Head Neck Soft Tissue Thyroid Once 12/23/2020   PTH, Intact With Ionized Calcium Once 12/15/2020   Vitamin D 25 Hydroxy Once 12/15/2020   Phosphorus Once 12/18/2020   PTH-Related Peptide Once 12/15/2020   XR SHOULDER LEFT (MIN 2 VIEWS) Once 04/22/2021   Urinalysis Once 12/15/2020   Microalbumin, Ur Once 12/18/2020   TSH With Reflex Ft4 Once 12/15/2020   CT ABDOMEN WO CONTRAST Additional Contrast? Radiologist Recommendation Once 02/05/2021   CT PELVIS WO CONTRAST Additional Contrast? Radiologist Recommendation Once 02/05/2021   CT CHEST W WO CONTRAST Once 02/05/2021   US THYROID Once 02/05/2021         Patient Active Problem List:     Fibroid     Lung nodule     Leiomyomatosis     Restrictive lung disease     Radiculopathy, cervical     Renal mass     DR (diabetic retinopathy) ([address]     HTN (hypertension)     DM (diabetes mellitus) ([address]     Dermatomyositis (HCC)     OA (osteoarthritis)     Hyperlipidemia     Anxiety and depression     Adnexal mass     Pelvic mass     Thyroid nodule

## 2021-01-20 ENCOUNTER — VIRTUAL VISIT (OUTPATIENT)
Dept: INTERNAL MEDICINE | Age: 71
End: 2021-01-20
Payer: MEDICAID

## 2021-01-20 DIAGNOSIS — Z87.19 H/O: GI BLEED: ICD-10-CM

## 2021-01-20 DIAGNOSIS — I10 ESSENTIAL HYPERTENSION: ICD-10-CM

## 2021-01-20 DIAGNOSIS — H35.3213 EXUDATIVE AGE-RELATED MACULAR DEGENERATION OF RIGHT EYE WITH INACTIVE SCAR (HCC): ICD-10-CM

## 2021-01-20 DIAGNOSIS — E83.52 HYPERCALCEMIA: ICD-10-CM

## 2021-01-20 DIAGNOSIS — E11.3592 TYPE 2 DIABETES MELLITUS WITH PROLIFERATIVE RETINOPATHY OF LEFT EYE, WITH LONG-TERM CURRENT USE OF INSULIN, MACULAR EDEMA PRESENCE UNSPECIFIED, UNSPECIFIED PROLIFERATIVE RETINOPATHY TYPE (HCC): ICD-10-CM

## 2021-01-20 DIAGNOSIS — Z79.4 TYPE 2 DIABETES MELLITUS WITH PROLIFERATIVE RETINOPATHY OF LEFT EYE, WITH LONG-TERM CURRENT USE OF INSULIN, MACULAR EDEMA PRESENCE UNSPECIFIED, UNSPECIFIED PROLIFERATIVE RETINOPATHY TYPE (HCC): ICD-10-CM

## 2021-01-20 DIAGNOSIS — M33.90 DERMATOMYOSITIS (HCC): ICD-10-CM

## 2021-01-20 DIAGNOSIS — Z13.220 SCREENING FOR HYPERLIPIDEMIA: Primary | ICD-10-CM

## 2021-01-20 DIAGNOSIS — E11.8 TYPE 2 DIABETES MELLITUS WITH UNSPECIFIED COMPLICATIONS (HCC): ICD-10-CM

## 2021-01-20 DIAGNOSIS — I48.92 ATRIAL FLUTTER, UNSPECIFIED TYPE (HCC): ICD-10-CM

## 2021-01-20 PROCEDURE — 99214 OFFICE O/P EST MOD 30 MIN: CPT | Performed by: INTERNAL MEDICINE

## 2021-01-20 RX ORDER — MEDICAL SUPPLY, MISCELLANEOUS
1 EACH MISCELLANEOUS DAILY
Qty: 1 EACH | Refills: 0 | Status: SHIPPED | OUTPATIENT
Start: 2021-01-20 | End: 2021-08-18

## 2021-01-20 SDOH — ECONOMIC STABILITY: TRANSPORTATION INSECURITY
IN THE PAST 12 MONTHS, HAS THE LACK OF TRANSPORTATION KEPT YOU FROM MEDICAL APPOINTMENTS OR FROM GETTING MEDICATIONS?: NO

## 2021-01-20 SDOH — ECONOMIC STABILITY: FOOD INSECURITY: WITHIN THE PAST 12 MONTHS, THE FOOD YOU BOUGHT JUST DIDN'T LAST AND YOU DIDN'T HAVE MONEY TO GET MORE.: NEVER TRUE

## 2021-01-20 SDOH — ECONOMIC STABILITY: TRANSPORTATION INSECURITY
IN THE PAST 12 MONTHS, HAS LACK OF TRANSPORTATION KEPT YOU FROM MEETINGS, WORK, OR FROM GETTING THINGS NEEDED FOR DAILY LIVING?: NO

## 2021-01-20 SDOH — ECONOMIC STABILITY: INCOME INSECURITY: HOW HARD IS IT FOR YOU TO PAY FOR THE VERY BASICS LIKE FOOD, HOUSING, MEDICAL CARE, AND HEATING?: NOT VERY HARD

## 2021-01-20 NOTE — PROGRESS NOTES
2021    TELEHEALTH EVALUATION -- Audio(During VLXRA-08 public health emergency)    Patient is evaluated via telephone on 2020. Consent:  The patient and/or health care decision maker is aware that that he may receive a bill for this telephone service, depending on his insurance coverage, and has provided verbal consent to proceed: Yes    HPI:    Anna Marie Villanueva (:  1950) has requested an audio evaluation for the following concern(s):    Chronic medical problems :   Pelvic mass, lung nodules, hyper calcemia : she states that the pelvic mass is benign and she is aware of the lung nodules   Labs are still pending     + h/o a flutter, GI bleed : she is now off aspirin and elliquis     Last colonoscopy and endoscopy was in 2018     dexa scan was normal in 2016    Review of Systems    Prior to Visit Medications    Medication Sig Taking? Authorizing Provider   gabapentin (NEURONTIN) 300 MG capsule Take 1 capsule by mouth 3 times daily for 90 days.  Yes Marisol Wong MD   Multiple Vitamins-Minerals (PRESERVISION AREDS 2) CAPS Take 2 capsules by mouth daily Yes Marisol Wong MD   lovastatin (MEVACOR) 20 MG tablet TAKE 1 TABLET BY MOUTH NIGHTLY Yes Susna Oh MD   pantoprazole (PROTONIX) 40 MG tablet TAKE 1 TABLET BY MOUTH DAILY Yes Susan Oh MD   Insulin Syringe-Needle U-100 ([employer] INSULIN SYRINGE) 31G X 5/16\" 1 ML MISC USE AS DIRECTED TWICE A DAY Yes Cherry Mina MD   Alcohol Swabs (PRO COMFORT ALCOHOL) 70 % PADS USE AS DIRECTED THREE TIMES A DAY Yes Cherry Mina MD   blood glucose monitor strips 1 strip by Other route 3 times daily Test___times daily    Diagnosis: 250.0   Diabetes Mellitus____Insulin Dependent____Non-Insulin Dependent Yes Cherry Mina MD   Lancets MISC 1 each by Does not apply route daily Use___times daily    Diagnisis:250.0  Diabetes Mellitus____Insulin Dependent___Non-Insulin Dependent Yes Cherry Mina MD   sodium chloride (ALTAMIST SPRAY) 0.65 % nasal limited: Vitals/Constitutional/EENT/Resp/CV/GI//MS/Neuro/Skin/Heme-Lymph-Imm. ASSESSMENT/PLAN:  1. Screening for hyperlipidemia    - Lipid Panel; Future    2. Exudative age-related macular degeneration of right eye with inactive scar ([address]      3. Dermatomyositis ([address]      4. Type 2 diabetes mellitus with unspecified complications (HCC)    - Hemoglobin A1C; Future    5. Atrial flutter, unspecified type ([address]      6. Type 2 diabetes mellitus with proliferative retinopathy of left eye, with long-term current use of insulin, macular edema presence unspecified, unspecified proliferative retinopathy type ([address]      7. Hypercalcemia    - Comprehensive Metabolic Panel; Future  - Vitamin D 25 Hydroxy; Future  - PTH, Intact With Ionized Calcium; Future    8. H/O: GI bleed    - CBC With Auto Differential; Future      Plan :   Encouraged to check bp at home   Encouraged to check blood sugars at home   Advised to get labs   Would prefer her to come to office for complete physical exam and evaluation of fall risk along with medication optimization     Total Time spent 25 min     No follow-ups on file. An  electronic signature was used to authenticate this note. --Johan Sage MD on 1/20/2021 at 1:14 PM    9}    Pursuant to the emergency declaration under the [facility] waiver authority and the [employer] Act, this Virtual  Visit was conducted, with patient's consent, to reduce the patient's risk of exposure to COVID-19 and provide continuity of care for an established patient. Services were provided through a telephone discussion virtually to substitute for in-person clinic visit.

## 2021-01-20 NOTE — PATIENT INSTRUCTIONS
Printed script for BP cuff given to pt. Scripts for lab mailed to pt with fasting instructions, pt will get labs done before next appt. Reprinted labs given to pt along with instructions. An After Visit Summary was printed and mailed to the patient.   ALL

## 2021-02-22 ENCOUNTER — TELEPHONE (OUTPATIENT)
Dept: INTERNAL MEDICINE | Age: 71
End: 2021-02-22

## 2021-02-22 NOTE — LETTER
[address]  [address] [address]  Phone: [phone]  Fax: [phone]    Dorian Cao MD        February 22, 2021 June JULIA Villanueva  [address] [place], [place] [address] [address]      Dear Anna Marie: This letter is a reminder that you may have diagnostic testing that has not been completed. It is important to your well-being that these test(s) are performed. Please find the outstanding test(s) attached. If you could please have these completed before your next appointment. You can have the test completed at any [facility] facility or Lab. Please see the order for scheduling instructions. Any testing that needs completed other than blood work or xray's please call [phone] to schedule an appointment. Otherwise can be done at any [facility]. Please call our office at Dept: [phone] for additional information on the outstanding tests or let us know if they have been completed so we may update your chart. If you have any questions or concerns, please don't hesitate to call.     Sincerely,        Dorian Cao MD

## 2021-04-07 ENCOUNTER — TELEPHONE (OUTPATIENT)
Dept: INTERNAL MEDICINE | Age: 71
End: 2021-04-07

## 2021-04-07 DIAGNOSIS — I10 ESSENTIAL HYPERTENSION: ICD-10-CM

## 2021-04-07 DIAGNOSIS — M19.012 PRIMARY OSTEOARTHRITIS OF LEFT SHOULDER: ICD-10-CM

## 2021-04-07 NOTE — TELEPHONE ENCOUNTER
E[facility] received a call from:jUNE    Name of Caller: same    Relationship to patient:self    Organization name: na    Best contact number: same    Reason for call: June missed her last appt. She rescheduled it but cannot get another appt until May. She will needs refills sooner is that going to be ok? cb pt if needs sooner apt to be fitted in.

## 2021-04-08 RX ORDER — GABAPENTIN 300 MG/1
300 CAPSULE ORAL 3 TIMES DAILY
Qty: 270 CAPSULE | Refills: 3 | Status: SHIPPED | OUTPATIENT
Start: 2021-04-08 | End: 2021-11-09 | Stop reason: SDUPTHER

## 2021-04-08 RX ORDER — ANTIOX #8/OM3/DHA/EPA/LUT/ZEAX 250-2.5 MG
2 CAPSULE ORAL DAILY
Qty: 60 CAPSULE | Refills: 3 | Status: SHIPPED | OUTPATIENT
Start: 2021-04-08 | End: 2022-08-09

## 2021-04-08 RX ORDER — PANTOPRAZOLE SODIUM 40 MG/1
40 TABLET, DELAYED RELEASE ORAL DAILY
Qty: 30 TABLET | Refills: 3 | Status: SHIPPED | OUTPATIENT
Start: 2021-04-08 | End: 2021-08-03

## 2021-04-08 RX ORDER — LOVASTATIN 20 MG/1
20 TABLET ORAL NIGHTLY
Qty: 30 TABLET | Refills: 3 | Status: SHIPPED | OUTPATIENT
Start: 2021-04-08 | End: 2021-08-03

## 2021-05-06 ENCOUNTER — TELEPHONE (OUTPATIENT)
Dept: INTERNAL MEDICINE | Age: 71
End: 2021-05-06

## 2021-05-06 NOTE — LETTER
[address] [address]  Phone: [phone]  Fax: [phone]    Garrett Harrell MD        May 6, 2021    Anna Marie JULIA Villanueva  [address] [facility] [address]      Dear Anna Marie: This letter is a reminder that you may have diagnostic testing that has not been completed. It is important to your well-being that these test(s) are performed. Please find the outstanding test(s) attached. If you could please have these completed before your next appointment. You can have the test completed at any [facility] facility or Lab. Please see the order for scheduling instructions. Any testing that needs completed other than blood work or xray's please call [phone] to schedule an appointment. Otherwise can be done at any [facility]. Please call our office at Dept: [phone] for additional information on the outstanding tests or let us know if they have been completed so we may update your chart. If you have any questions or concerns, please don't hesitate to call.     Sincerely,        Garrett Harrell MD

## 2021-06-07 ENCOUNTER — TELEPHONE (OUTPATIENT)
Dept: INTERNAL MEDICINE | Age: 71
End: 2021-06-07

## 2021-06-07 NOTE — LETTER
[address]  [address] [address]  Phone: [phone]  Fax: [phone]    Walter Harrison MD        June 7, 2021 June JULIA Villanueva  [address] [place] [address]      Dear Anna Marie: This letter is a reminder that you may have diagnostic testing that has not been completed. It is important to your well-being that these test(s) are performed. Please find the outstanding test(s) attached. If you could please have these completed before your next appointment. You can have the test completed at any [facility] facility or Lab. Please see the order for scheduling instructions. Any testing that needs completed other than blood work or xray's please call [phone] to schedule an appointment. Otherwise can be done at any [facility]. Please call our office at Dept: [phone] for additional information on the outstanding tests or let us know if they have been completed so we may update your chart. If you have any questions or concerns, please don't hesitate to call.     Sincerely,        Walter Harrison MD

## 2021-07-08 ENCOUNTER — TELEPHONE (OUTPATIENT)
Dept: INTERNAL MEDICINE | Age: 71
End: 2021-07-08

## 2021-07-08 DIAGNOSIS — R19.00 PELVIC MASS: Primary | ICD-10-CM

## 2021-07-08 NOTE — TELEPHONE ENCOUNTER
PC from Nurse Karyna Saenz at [facility] asking for an referral to OBGYN for due to her Large Pelvic Mass.  Referral pended

## 2021-07-21 ENCOUNTER — TELEPHONE (OUTPATIENT)
Dept: OBGYN | Age: 71
End: 2021-07-21

## 2021-07-21 ENCOUNTER — TELEPHONE (OUTPATIENT)
Dept: INTERNAL MEDICINE | Age: 71
End: 2021-07-21

## 2021-07-21 NOTE — TELEPHONE ENCOUNTER
Patient has a referral to see OBGYN for a pelvic mass, no new patient appts available.  Please contact patient to assist

## 2021-07-21 NOTE — TELEPHONE ENCOUNTER
NUSRAT: audrey from [facility] calling the office stating that the pt almost had a fall yesterday while she was shopping, pt became short of breath, weak, pt has a appt on 8/2/21 pt would like to be evaluated for a rollator walker, pt stated if she had one she could of sat and rest for a minute to catch her breath.

## 2021-07-21 NOTE — TELEPHONE ENCOUNTER
----- Message from Carla Thompson sent at 7/21/2021 11:15 AM EDT -----  Subject: Message to Provider    QUESTIONS  Information for Provider? Therapist calling in to report on pt ; Evaluated   for Home Therapy 1 time a week for 4 weeks. Any question may call [facility] the   therapist at [phone]  ---------------------------------------------------------------------------  --------------  [address]  What is the best way for the office to contact you? OK to leave message on   voicemail  Preferred Call Back Phone Number? [phone]  ---------------------------------------------------------------------------  --------------  SCRIPT ANSWERS  Relationship to Patient? Third Party  Representative Name?  Lani Shore - therapist

## 2021-07-27 NOTE — TELEPHONE ENCOUNTER
Patient needs an appointment for in office check up for wheel chair or rollator walker .  Please arrange an office visit

## 2021-07-29 ENCOUNTER — TELEPHONE (OUTPATIENT)
Dept: INTERNAL MEDICINE | Age: 71
End: 2021-07-29

## 2021-07-29 NOTE — TELEPHONE ENCOUNTER
Helder Gómez with [facility] called and said she has been trying to reach the patient for an assessment but has had to leave 3 voicemail's and she went out to the patients home and the patient did not answer the door.

## 2021-08-02 ENCOUNTER — OFFICE VISIT (OUTPATIENT)
Dept: INTERNAL MEDICINE | Age: 71
End: 2021-08-02
Payer: MEDICAID

## 2021-08-02 ENCOUNTER — HOSPITAL ENCOUNTER (OUTPATIENT)
Age: 71
Setting detail: SPECIMEN
Discharge: HOME | End: 2021-08-02
Payer: MEDICAID

## 2021-08-02 VITALS
BODY MASS INDEX: 28.49 KG/M2 | WEIGHT: 171 LBS | HEIGHT: 65 IN | DIASTOLIC BLOOD PRESSURE: 86 MMHG | HEART RATE: 122 BPM | SYSTOLIC BLOOD PRESSURE: 139 MMHG

## 2021-08-02 DIAGNOSIS — E11.3592 TYPE 2 DIABETES MELLITUS WITH PROLIFERATIVE RETINOPATHY OF LEFT EYE, WITH LONG-TERM CURRENT USE OF INSULIN, MACULAR EDEMA PRESENCE UNSPECIFIED, UNSPECIFIED PROLIFERATIVE RETINOPATHY TYPE (HCC): ICD-10-CM

## 2021-08-02 DIAGNOSIS — Z79.4 TYPE 2 DIABETES MELLITUS WITH PROLIFERATIVE RETINOPATHY OF LEFT EYE, WITH LONG-TERM CURRENT USE OF INSULIN, MACULAR EDEMA PRESENCE UNSPECIFIED, UNSPECIFIED PROLIFERATIVE RETINOPATHY TYPE (HCC): ICD-10-CM

## 2021-08-02 DIAGNOSIS — I10 ESSENTIAL HYPERTENSION: ICD-10-CM

## 2021-08-02 DIAGNOSIS — D64.9 ANEMIA, UNSPECIFIED TYPE: ICD-10-CM

## 2021-08-02 DIAGNOSIS — H74.40: ICD-10-CM

## 2021-08-02 DIAGNOSIS — M47.9 ARTHRITIS OF BACK: ICD-10-CM

## 2021-08-02 DIAGNOSIS — Z13.220 SCREENING FOR HYPERLIPIDEMIA: ICD-10-CM

## 2021-08-02 DIAGNOSIS — Z13.220 SCREENING FOR HYPERLIPIDEMIA: Primary | ICD-10-CM

## 2021-08-02 DIAGNOSIS — M54.16 LUMBAR RADICULOPATHY, CHRONIC: ICD-10-CM

## 2021-08-02 LAB
CHOLESTEROL/HDL RATIO: 3.8
CHOLESTEROL: 205 MG/DL
CREATININE URINE: 177 MG/DL (ref 28–217)
HBA1C MFR BLD: 10.8 %
HDLC SERPL-MCNC: 54 MG/DL
LDL CHOLESTEROL: 134 MG/DL (ref 0–130)
TRIGL SERPL-MCNC: 83 MG/DL
VLDLC SERPL CALC-MCNC: ABNORMAL MG/DL (ref 1–30)

## 2021-08-02 PROCEDURE — 1090F PRES/ABSN URINE INCON ASSESS: CPT | Performed by: INTERNAL MEDICINE

## 2021-08-02 PROCEDURE — 99211 OFF/OP EST MAY X REQ PHY/QHP: CPT | Performed by: INTERNAL MEDICINE

## 2021-08-02 PROCEDURE — G8399 PT W/DXA RESULTS DOCUMENT: HCPCS | Performed by: INTERNAL MEDICINE

## 2021-08-02 PROCEDURE — 1036F TOBACCO NON-USER: CPT | Performed by: INTERNAL MEDICINE

## 2021-08-02 PROCEDURE — 83036 HEMOGLOBIN GLYCOSYLATED A1C: CPT | Performed by: INTERNAL MEDICINE

## 2021-08-02 PROCEDURE — G8427 DOCREV CUR MEDS BY ELIG CLIN: HCPCS | Performed by: INTERNAL MEDICINE

## 2021-08-02 PROCEDURE — 1123F ACP DISCUSS/DSCN MKR DOCD: CPT | Performed by: INTERNAL MEDICINE

## 2021-08-02 PROCEDURE — 99214 OFFICE O/P EST MOD 30 MIN: CPT | Performed by: INTERNAL MEDICINE

## 2021-08-02 PROCEDURE — 4040F PNEUMOC VAC/ADMIN/RCVD: CPT | Performed by: INTERNAL MEDICINE

## 2021-08-02 PROCEDURE — G8417 CALC BMI ABV UP PARAM F/U: HCPCS | Performed by: INTERNAL MEDICINE

## 2021-08-02 PROCEDURE — 3046F HEMOGLOBIN A1C LEVEL >9.0%: CPT | Performed by: INTERNAL MEDICINE

## 2021-08-02 PROCEDURE — 3017F COLORECTAL CA SCREEN DOC REV: CPT | Performed by: INTERNAL MEDICINE

## 2021-08-02 PROCEDURE — 2022F DILAT RTA XM EVC RTNOPTHY: CPT | Performed by: INTERNAL MEDICINE

## 2021-08-02 RX ORDER — PNV NO.95/FERROUS FUM/FOLIC AC 28MG-0.8MG
325 TABLET ORAL
Qty: 30 TABLET | Refills: 3 | Status: SHIPPED | OUTPATIENT
Start: 2021-08-02 | End: 2021-08-02 | Stop reason: ALTCHOICE

## 2021-08-02 RX ORDER — VIT C/E/ZN/COPPR/LUTEIN/ZEAXAN 250MG-90MG
1 CAPSULE ORAL DAILY
Qty: 90 TABLET | Refills: 3 | Status: SHIPPED | OUTPATIENT
Start: 2021-08-02 | End: 2022-03-01 | Stop reason: SDUPTHER

## 2021-08-02 ASSESSMENT — PATIENT HEALTH QUESTIONNAIRE - PHQ9
2. FEELING DOWN, DEPRESSED OR HOPELESS: 0
SUM OF ALL RESPONSES TO PHQ QUESTIONS 1-9: 1
1. LITTLE INTEREST OR PLEASURE IN DOING THINGS: 1
SUM OF ALL RESPONSES TO PHQ9 QUESTIONS 1 & 2: 1

## 2021-08-02 NOTE — PROGRESS NOTES
[facility]      Date of Patient's Visit: 8/2/2021    Progress note    Patient Care Team:  Shikha Alcazar MD as PCP - General (Internal Medicine)  Shikha Alcazar MD as PCP - [address] [place] [address] Dr Cantu Provider  Flaco Sosa MD as Consulting Physician (Internal Medicine)      [address] [place] [address]  Chief Complaint   Patient presents with    Hypertension    Health Maintenance     A1c running, labs pended, due for eye exam, decline colonoscopy    Equipment Request     walker with seat and basket       SUBJECTIVE  Anna Marie Villanueva is a 66 y.o. female who presents for c/o walking disability from hip pain, lumbar stenosis and severe physical deconditioning. This is limiting her activities of daily living   In addition she also has slightly reduced vision bilaterally. She lives with her son and also has home health nurse visit 3 times a week      She also reports to have a recent hospitalization at [facility] for nausea vomiting and dehydration (records are not available to me.)    HbA1c is 10.8 today fasting blood sugar is 126. She takes long-acting insulin 50 units every night she is very compliant with her medications. He has lost a lot of weight and has loss of appetite. Does not eat regular meals because of her loss of appetite but instead she likes to snack on salty potato chips all day. There is a near growth in the right ear, nontender, granulomatous, infected, chronic for many months has been growing bigger in size. ROS  All other review of systems negative, except for those noted.     Review of Systems    Past Medical History:   Diagnosis Date    Anxiety and depression     Fibroid     Hyperlipidemia     Hypertension     Leiomyomatosis     Lung nodule     Obesity     Osteoarthritis     Radiculopathy, cervical     Renal mass     Restrictive lung disease     Retinal abnormality - diabetes related     Type II or unspecified type diabetes mellitus without mention of complication, not stated as uncontrolled        Past Surgical History:   Procedure Laterality Date    CARDIAC CATHETERIZATION       SECTION      LUNG BIOPSY         Family History   Problem Relation Age of Onset    High Blood Pressure Mother     Diabetes Mother     Heart Disease Mother     Cancer Father        Social History     Socioeconomic History    Marital status: Single     Spouse name: None    Number of children: None    Years of education: None    Highest education level: None   Occupational History    None   Tobacco Use    Smoking status: Never Smoker    Smokeless tobacco: Never Used   Vaping Use    Vaping Use: Never used   Substance and Sexual Activity    Alcohol use: No    Drug use: No    Sexual activity: None   Other Topics Concern    None   Social History Narrative    None     Social Determinants of Health     Financial Resource Strain: Low Risk     Difficulty of Paying Living Expenses: Not very hard   Food Insecurity: No Food Insecurity    Worried About Running Out of Food in the Last Year: Never true    Breezy of Food in the Last Year: Never true   Transportation Needs: No Transportation Needs    Lack of Transportation (Medical): No    Lack of Transportation (Non-Medical):  No   Physical Activity:     Days of Exercise per Week:     Minutes of Exercise per Session:    Stress:     Feeling of Stress :    Social Connections:     Frequency of Communication with Friends and Family:     Frequency of Social Gatherings with Friends and Family:     Attends Baptism Services:     Active Member of Clubs or Organizations:     Attends Club or Organization Meetings:     Marital Status:    Intimate Partner Violence:     Fear of Current or Ex-Partner:     Emotionally Abused:     Physically Abused:     Sexually Abused:        Current Outpatient Medications   Medication Sig Dispense Refill    Multiple Vitamins-Minerals (PRESERVISION AREDS 2) CHEW Take 1 tablet by mouth daily 90 tablet 3    gabapentin (NEURONTIN) 300 MG capsule Take 1 capsule by mouth 3 times daily for 90 days. 270 capsule 3    lovastatin (MEVACOR) 20 MG tablet Take 1 tablet by mouth nightly 30 tablet 3    pantoprazole (PROTONIX) 40 MG tablet Take 1 tablet by mouth daily 30 tablet 3    Insulin Syringe-Needle U-100 (ULTICARE INSULIN SYRINGE) 31G X 5/16\" 1 ML MISC USE AS DIRECTED TWICE A  each 0    Alcohol Swabs (PRO COMFORT ALCOHOL) 70 % PADS USE AS DIRECTED THREE TIMES A  each 3    traMADol (ULTRAM) 50 MG tablet 50 mg as needed for Pain.        Incontinence Supply Disposable (CERTAINTY OVERNIGHT UNDERWEAR) MISC 1 box by Does not apply route 3 times daily 3 each 2    dilTIAZem (CARDIZEM CD) 120 MG extended release capsule TAKE 1 CAPSULE BY MOUTH DAILY 30 capsule 3    insulin glargine (LANTUS) 100 UNIT/ML injection vial Inject 50 Units into the skin nightly 5 vial 11    Multiple Vitamins-Minerals (PRESERVISION AREDS 2) CAPS Take 2 capsules by mouth daily (Patient not taking: Reported on 8/2/2021) 60 capsule 3    Blood Pressure Monitoring (B-D ASSURE BPM/AUTO ARM CUFF) MISC 1 Device by Does not apply route daily (Patient not taking: Reported on 8/2/2021) 1 each 0    blood glucose monitor strips 1 strip by Other route 3 times daily Test___times daily    Diagnosis: 250.0   Diabetes Mellitus____Insulin Dependent____Non-Insulin Dependent (Patient not taking: Reported on 8/2/2021) 100 strip 11    Lancets MISC 1 each by Does not apply route daily Use___times daily    Diagnisis:250.0  Diabetes Mellitus____Insulin Dependent___Non-Insulin Dependent (Patient not taking: Reported on 8/2/2021) 100 each 11    sodium chloride (ALTAMIST SPRAY) 0.65 % nasal spray 1 spray by Nasal route as needed for Congestion (Patient not taking: Reported on 8/2/2021) 1 Bottle 3    lisinopril (PRINIVIL;ZESTRIL) 20 MG tablet Take 1 tablet by mouth daily Cancel Lisinopril 5 mg and 10 mg prescription 30 tablet 11     No current facility-administered medications for this visit. No Known Allergies    PHYSICAL EXAM:   Vital Signs:   /86   Pulse 122   Ht 5' 5\" (1.651 m)   Wt 171 lb (77.6 kg)   BMI 28.46 kg/m²     BP Readings from Last 3 Encounters:   08/02/21 139/86   11/20/19 (!) 166/98   10/23/19 (!) 152/82        Wt Readings from Last 3 Encounters:   08/02/21 171 lb (77.6 kg)   11/20/19 200 lb (90.7 kg)   10/23/19 196 lb (88.9 kg)       Physical Exam  Cardiovascular:      Rate and Rhythm: Normal rate. Pulmonary:      Effort: Pulmonary effort is normal.   Musculoskeletal:         General: Normal range of motion. Skin:     Coloration: Skin is jaundiced. Neurological:      Mental Status: She is alert. Psychiatric:         Behavior: Behavior normal.         Body mass index is 28.46 kg/m².     RESULTS    Lab Findings    CBC:   Lab Results   Component Value Date    WBC 6.9 10/23/2019    HGB 15.8 10/23/2019     10/23/2019     05/23/2012     BMP:   Lab Results   Component Value Date     10/23/2019    K 4.2 10/23/2019     10/23/2019    CO2 22 10/23/2019    BUN 8 10/23/2019    CREATININE 0.72 10/23/2019    GLUCOSE 100 10/23/2019    GLUCOSE 91 05/23/2012     HEMOGLOBIN A1C:   Lab Results   Component Value Date    LABA1C 10.8 08/02/2021     MICROALBUMIN URINE:   Lab Results   Component Value Date    MICROALBUR 37 12/13/2018     FASTING LIPID PANEL:   Lab Results   Component Value Date    CHOL 165 10/23/2019    HDL 39 (L) 10/23/2019    TRIG 96 10/23/2019     Lab Results   Component Value Date    LDLCHOLESTEROL 107 10/23/2019     LIVER PROFILE:   Lab Results   Component Value Date    ALT 16 12/13/2018    AST 19 12/13/2018    PROT 7.6 12/13/2018    BILITOT 0.39 12/13/2018    LABALBU 4.1 12/13/2018    LABALBU 4.2 05/23/2012      THYROID FUNCTION:   Lab Results   Component Value Date    TSH 1.70 10/29/2016      URINE ANALYSIS: No results found for: LABURIN      ASSESSMENT & PLAN    1. Screening for hyperlipidemia    - Lipid Panel; Future    2. Type 2 diabetes mellitus with proliferative retinopathy of left eye, with long-term current use of insulin, macular edema presence unspecified, unspecified proliferative retinopathy type (HCC)    -  DIABETES FOOT EXAM  - POCT glycosylated hemoglobin (Hb A1C)  - Microalbumin, Ur; Future  - Creatinine, Random Urine; Future    3. Anemia, unspecified type      4. Lumbar radiculopathy, chronic      5. Arthritis of back    - DME Order for Patricia Hudson as OP    6. Polyp of ear    - External Referral To ENT       labs pended   Ordered wheelchair with wheels and seat for the patient to assist in her activities of daily living and prevent falls. She has severe physical deconditioning as well as back pain      For uncontrolled diabetes mellitus she is advised to take carb substitutes for example instead of potato chips she can do kale chips   Advised against restricting her diet and trying to have regular meals  She will check predinner blood sugars for me and return call in 1 to 2 weeks to update her diabetic medic management    Follow up Instructions:    1. Return in about 2 weeks (around 8/16/2021). 2. Reviewed prior labs and health maintenance. 3. Discussed use, benefit, and side effects of prescribed medications. Barriers to medication compliance addressed. All patient questions answered. Pt voiced understanding.      4. Patient given educational materials - see patient instructions      MD KASHMIR Jones  Attending Physician, [address] [place] [address], Internal Medicine Residency Program  [address] [place] [address]  8/2/2021, 11:57 AM

## 2021-08-02 NOTE — TELEPHONE ENCOUNTER
E-scribing request for pantoprazole and lovastatin. Pt has future appt. Health Maintenance   Topic Date Due    COVID-19 Vaccine (1) Never done    Diabetic retinal exam  06/19/2016    Colon cancer screen colonoscopy  08/08/2019    Diabetic microalbuminuria test  12/13/2019    Lipid screen  10/23/2020    Potassium monitoring  10/23/2020    Creatinine monitoring  10/23/2020    Flu vaccine (1) 09/01/2021    Breast cancer screen  10/16/2021    A1C test (Diabetic or Prediabetic)  11/02/2021    Diabetic foot exam  08/02/2022    DTaP/Tdap/Td vaccine (2 - Td or Tdap) 02/01/2023    DEXA (modify frequency per FRAX score)  Completed    Shingles Vaccine  Completed    Pneumococcal 65+ years Vaccine  Completed    Hepatitis C screen  Completed    Hepatitis A vaccine  Aged Out    Hib vaccine  Aged Out    Meningococcal (ACWY) vaccine  Aged Out             (applicable per patient's age: Cancer Screenings, Depression Screening, Fall Risk Screening, Immunizations)    Hemoglobin A1C (%)   Date Value   08/02/2021 10.8   11/07/2019 7.6   06/24/2019 7.3     Microalb/Crt.  Ratio (mcg/mg creat)   Date Value   12/13/2018 20     LDL Cholesterol (mg/dL)   Date Value   10/23/2019 107     AST (U/L)   Date Value   12/13/2018 19     ALT (U/L)   Date Value   12/13/2018 16     BUN (mg/dL)   Date Value   10/23/2019 8      (goal A1C is < 7)   (goal LDL is <100) need 30-50% reduction from baseline     BP Readings from Last 3 Encounters:   08/02/21 139/86   11/20/19 (!) 166/98   10/23/19 (!) 152/82    (goal /80)      All Future Testing planned in [facility]:  Lab Frequency Next Occurrence   Hemoglobin A1C Once 08/06/2021   Lipid Panel Once 08/06/2021   Comprehensive Metabolic Panel Once 25/56/5271   CBC With Auto Differential Once 08/06/2021   Vitamin D 25 Hydroxy Once 08/06/2021   PTH, Intact With Ionized Calcium Once 08/06/2021       Next Visit Date:  Future Appointments   Date Time Provider Sigrid Begum   8/5/2021  3:45 PM Jean Blanco DO [facility]   8/18/2021  4:00 [facility]            Patient Active Problem List:     Fibroid     Lung nodule     Leiomyomatosis     Restrictive lung disease     Radiculopathy, cervical     Renal mass     DR (diabetic retinopathy) ([address]     HTN (hypertension)     Type 2 diabetes mellitus with proliferative retinopathy of left eye, with long-term current use of insulin (HCC)     Dermatomyositis (HCC)     OA (osteoarthritis)     Hyperlipidemia     Anxiety and depression     Adnexal mass     Pelvic mass     Thyroid nodule     Exudative age-related macular degeneration of right eye with inactive scar (HCC)     Atrial flutter ([address]

## 2021-08-02 NOTE — PATIENT INSTRUCTIONS
Medications e-scribe to pharmacy of pt's choice. A printed script for Durable Medical Equipment was ordered for the patient. The script was faxed to [facility] RONALDO Be, [address] [place] [address]  P: [phone] F: [phone] . Scripts for lab given to pt with fasting instructions, pt will get labs done before next appt. Referral for ENT sent to Dr Iza Hansen  they will call pt for appt, copy of referral with number and address given to pt. Pt should call referral number if not heard from within a couple of weeks. An After Visit Summary was printed and given to the patient.   ALL

## 2021-08-03 LAB
CREATININE URINE: 178.6 MG/DL (ref 28–217)
MICROALBUMIN/CREAT 24H UR: 223 MG/L
MICROALBUMIN/CREAT UR-RTO: 125 MCG/MG CREAT

## 2021-08-03 RX ORDER — PANTOPRAZOLE SODIUM 40 MG/1
40 TABLET, DELAYED RELEASE ORAL DAILY
Qty: 30 TABLET | Refills: 3 | Status: SHIPPED | OUTPATIENT
Start: 2021-08-03 | End: 2021-11-09 | Stop reason: SDUPTHER

## 2021-08-03 RX ORDER — LOVASTATIN 20 MG/1
20 TABLET ORAL NIGHTLY
Qty: 30 TABLET | Refills: 3 | Status: SHIPPED | OUTPATIENT
Start: 2021-08-03 | End: 2021-11-09 | Stop reason: SDUPTHER

## 2021-08-04 ENCOUNTER — TELEPHONE (OUTPATIENT)
Dept: INTERNAL MEDICINE | Age: 71
End: 2021-08-04

## 2021-08-09 ENCOUNTER — TELEPHONE (OUTPATIENT)
Dept: INTERNAL MEDICINE | Age: 71
End: 2021-08-09

## 2021-08-09 NOTE — TELEPHONE ENCOUNTER
PC from [facility] care wanting to inform you that she extending her home care once a week for 3 weeks starting next week.

## 2021-08-18 ENCOUNTER — VIRTUAL VISIT (OUTPATIENT)
Dept: INTERNAL MEDICINE | Age: 71
End: 2021-08-18
Payer: MEDICAID

## 2021-08-18 DIAGNOSIS — R00.0 TACHYCARDIA: Primary | ICD-10-CM

## 2021-08-18 DIAGNOSIS — Z79.4 TYPE 2 DIABETES MELLITUS WITH PROLIFERATIVE RETINOPATHY OF LEFT EYE, WITH LONG-TERM CURRENT USE OF INSULIN, MACULAR EDEMA PRESENCE UNSPECIFIED, UNSPECIFIED PROLIFERATIVE RETINOPATHY TYPE (HCC): ICD-10-CM

## 2021-08-18 DIAGNOSIS — E11.3592 TYPE 2 DIABETES MELLITUS WITH PROLIFERATIVE RETINOPATHY OF LEFT EYE, WITH LONG-TERM CURRENT USE OF INSULIN, MACULAR EDEMA PRESENCE UNSPECIFIED, UNSPECIFIED PROLIFERATIVE RETINOPATHY TYPE (HCC): ICD-10-CM

## 2021-08-18 DIAGNOSIS — I10 ESSENTIAL HYPERTENSION: ICD-10-CM

## 2021-08-18 PROCEDURE — 99214 OFFICE O/P EST MOD 30 MIN: CPT | Performed by: INTERNAL MEDICINE

## 2021-08-18 NOTE — PROGRESS NOTES
2021    TELEHEALTH EVALUATION -- Audio(During GJMSM-04 public health emergency)    Patient is evaluated via telephone on 2020. Consent:  The patient and/or health care decision maker is aware that that he may receive a bill for this telephone service, depending on his insurance coverage, and has provided verbal consent to proceed: Yes    HPI:    Anna Marie Villanueva (:  1950) has requested an audio evaluation for the following concern(s):     f/u on DM and HTN:   fbs between 100-110  Post prandial blood sugars are 110-180`s. No hypoglycemic episodes     bp : well controlled : noticed tachycardia in the chart. The patient states at home the HR stays >100 ( per her visiting nurse)   No dizziness or passing out events     Review of Systems    Prior to Visit Medications    Medication Sig Taking? Authorizing Provider   pantoprazole (PROTONIX) 40 MG tablet TAKE 1 TABLET BY MOUTH DAILY Yes Everardo Dubois MD   lovastatin (MEVACOR) 20 MG tablet TAKE 1 TABLET BY MOUTH NIGHTLY Yes Everardo Dubois MD   gabapentin (NEURONTIN) 300 MG capsule Take 1 capsule by mouth 3 times daily for 90 days. Yes Everardo Dubois MD   Multiple Vitamins-Minerals (PRESERVISION AREDS 2) CAPS Take 2 capsules by mouth daily Yes Susan Oh MD   Insulin Syringe-Needle U-100 (ULTICARE INSULIN SYRINGE) 31G X 5/16\" 1 ML MISC USE AS DIRECTED TWICE A DAY Yes Everardo Dubois MD   Alcohol Swabs (PRO COMFORT ALCOHOL) 70 % PADS USE AS DIRECTED THREE TIMES A DAY Yes Everardo Dubois MD   blood glucose monitor strips 1 strip by Other route 3 times daily Test___times daily    Diagnosis: 250.0   Diabetes Mellitus____Insulin Dependent____Non-Insulin Dependent Yes Everardo Dubois MD   Lancets MISC 1 each by Does not apply route daily Use___times daily    Diagnisis:250.0  Diabetes Mellitus____Insulin Dependent___Non-Insulin Dependent Yes Everardo Dubois MD   traMADol (ULTRAM) 50 MG tablet 50 mg as needed for Pain.   Yes Historical Provider, MD Incontinence Supply Disposable (CERTAINTY OVERNIGHT UNDERWEAR) MISC 1 box by Does not apply route 3 times daily Yes Rosy Coombs MD   insulin glargine (LANTUS) 100 UNIT/ML injection vial Inject 50 Units into the skin nightly Yes Rosy Coombs MD   lisinopril (PRINIVIL;ZESTRIL) 20 MG tablet Take 1 tablet by mouth daily Cancel Lisinopril 5 mg and 10 mg prescription Yes Rosy Coombs MD   Multiple Vitamins-Minerals (PRESERVISION AREDS 2) CHEW Take 1 tablet by mouth daily  Patient not taking: Reported on 8/18/2021  Rosy Coombs MD   sodium chloride (ALTAMIST SPRAY) 0.65 % nasal spray 1 spray by Nasal route as needed for Congestion  Patient not taking: Reported on 8/2/2021  Rosy Coombs MD       Social History     Tobacco Use    Smoking status: Never Smoker    Smokeless tobacco: Never Used   Vaping Use    Vaping Use: Never used   Substance Use Topics    Alcohol use: No    Drug use: No            RECORD REVIEW: Previous medical records were reviewed at today's visit. PHYSICAL EXAMINATION:    Vital Signs: (As obtained by patient/caregiver at home)      Constitutional: [] Appears well-developed and well-nourished [] No apparent distress      [] Abnormal   Mental status  [] Alert and awake  [] Oriented to person/place/time []Able to follow commands        Pulmonary/Chest: [] Respiratory effort normal.  [] No visualized signs of difficulty breathing or respiratory distress        [] Abnormal              Psychiatric:       [] Normal [] Abnormal        [] No Hallucinations    Other pertinent observable physical exam findings:-              RECORD REVIEW: Previous medical records were reviewed at today's visit. The past family, medical and social histories were reviewed and unchanged with the exceptions of what is mentioned in this note.     Due to this being a TeleHealth encounter, evaluation of the following organ systems is limited: Vitals/Constitutional/EENT/Resp/CV/GI//MS/Neuro/Skin/Heme-Lymph-Imm. ASSESSMENT/PLAN:  1. Tachycardia    - EKG 12 lead; Future    2. Type 2 diabetes mellitus with proliferative retinopathy of left eye, with long-term current use of insulin, macular edema presence unspecified, unspecified proliferative retinopathy type ([address]      3. Essential hypertension  Well controlled       Plan : ordered EKG to rule out an arrhythmia ,   She is not on cardizem anymore   Will consider restarting cardizem or metoprolol      Total Time spent 25 min     No follow-ups on file. An  electronic signature was used to authenticate this note. --Shaquille Appiah MD on 8/18/2021 at 4:31 PM    9}    Pursuant to the emergency declaration under the [facility] waiver authority and the [employer] and [employer] Act, this Virtual  Visit was conducted, with patient's consent, to reduce the patient's risk of exposure to COVID-19 and provide continuity of care for an established patient. Services were provided through a telephone discussion virtually to substitute for in-person clinic visit.

## 2021-08-18 NOTE — PATIENT INSTRUCTIONS
EKG test ordered, test is a walk in at [facility] bring order with you. An After Visit Summary was printed and mailed to the patient.   ALL

## 2021-11-09 DIAGNOSIS — I10 ESSENTIAL HYPERTENSION: ICD-10-CM

## 2021-11-09 DIAGNOSIS — Z79.4 TYPE 2 DIABETES MELLITUS WITHOUT COMPLICATION, WITH LONG-TERM CURRENT USE OF INSULIN (HCC): ICD-10-CM

## 2021-11-09 DIAGNOSIS — E11.9 TYPE 2 DIABETES MELLITUS WITHOUT COMPLICATION, WITH LONG-TERM CURRENT USE OF INSULIN (HCC): ICD-10-CM

## 2021-11-09 DIAGNOSIS — J39.2 DRY THROAT: ICD-10-CM

## 2021-11-09 DIAGNOSIS — E11.9 TYPE 2 DIABETES MELLITUS WITHOUT COMPLICATION, UNSPECIFIED WHETHER LONG TERM INSULIN USE (HCC): ICD-10-CM

## 2021-11-09 DIAGNOSIS — M19.012 PRIMARY OSTEOARTHRITIS OF LEFT SHOULDER: ICD-10-CM

## 2021-11-09 RX ORDER — PEN NEEDLE, DIABETIC 32GX 5/32"
NEEDLE, DISPOSABLE MISCELLANEOUS
Qty: 100 EACH | Refills: 11 | Status: SHIPPED | OUTPATIENT
Start: 2021-11-09 | End: 2022-03-01 | Stop reason: SDUPTHER

## 2021-11-09 RX ORDER — ECHINACEA PURPUREA EXTRACT 125 MG
1 TABLET ORAL PRN
Qty: 1 EACH | Refills: 5 | Status: SHIPPED | OUTPATIENT
Start: 2021-11-09 | End: 2022-08-09

## 2021-11-09 RX ORDER — GLUCOSAMINE HCL/CHONDROITIN SU 500-400 MG
1 CAPSULE ORAL 3 TIMES DAILY
Qty: 100 STRIP | Refills: 11 | Status: SHIPPED | OUTPATIENT
Start: 2021-11-09 | End: 2022-03-01 | Stop reason: SDUPTHER

## 2021-11-09 RX ORDER — LOVASTATIN 20 MG/1
20 TABLET ORAL NIGHTLY
Qty: 30 TABLET | Refills: 3 | Status: SHIPPED | OUTPATIENT
Start: 2021-11-09 | End: 2022-03-01 | Stop reason: SDUPTHER

## 2021-11-09 RX ORDER — BLOOD SUGAR DIAGNOSTIC
STRIP MISCELLANEOUS
Qty: 100 EACH | Refills: 11 | Status: SHIPPED | OUTPATIENT
Start: 2021-11-09 | End: 2022-03-01 | Stop reason: SDUPTHER

## 2021-11-09 RX ORDER — GABAPENTIN 300 MG/1
300 CAPSULE ORAL 3 TIMES DAILY
Qty: 270 CAPSULE | Refills: 3 | Status: SHIPPED | OUTPATIENT
Start: 2021-11-09 | End: 2022-08-24

## 2021-11-09 RX ORDER — LANCETS 30 GAUGE
1 EACH MISCELLANEOUS DAILY
Qty: 100 EACH | Refills: 11 | Status: SHIPPED | OUTPATIENT
Start: 2021-11-09 | End: 2022-03-01 | Stop reason: SDUPTHER

## 2021-11-09 RX ORDER — PANTOPRAZOLE SODIUM 40 MG/1
40 TABLET, DELAYED RELEASE ORAL DAILY
Qty: 30 TABLET | Refills: 3 | Status: SHIPPED | OUTPATIENT
Start: 2021-11-09 | End: 2022-03-01 | Stop reason: SDUPTHER

## 2021-11-09 NOTE — TELEPHONE ENCOUNTER
Pt daughter passed away suddenly. Pt is going away to heal to stay at a friends house for the holidays. She would like refills on all her medications. Pt expects to be gone through the holidays and into next year. Medications pended. Health Maintenance   Topic Date Due    COVID-19 Vaccine (1) Never done    Diabetic retinal exam  06/19/2016    Colon cancer screen colonoscopy  08/08/2019    Potassium monitoring  10/23/2020    Creatinine monitoring  10/23/2020    Flu vaccine (1) 09/01/2021    A1C test (Diabetic or Prediabetic)  11/02/2021    Breast cancer screen  10/16/2021    Diabetic foot exam  08/02/2022    Diabetic microalbuminuria test  08/02/2022    Lipid screen  08/02/2022    DTaP/Tdap/Td vaccine (2 - Td or Tdap) 02/01/2023    DEXA (modify frequency per FRAX score)  Completed    Shingles Vaccine  Completed    Pneumococcal 65+ years Vaccine  Completed    Hepatitis C screen  Completed    Hepatitis A vaccine  Aged Out    Hib vaccine  Aged Out    Meningococcal (ACWY) vaccine  Aged Out             (applicable per patient's age: Cancer Screenings, Depression Screening, Fall Risk Screening, Immunizations)    Hemoglobin A1C (%)   Date Value   08/02/2021 10.8   11/07/2019 7.6   06/24/2019 7.3     Microalb/Crt.  Ratio (mcg/mg creat)   Date Value   08/02/2021 125 (H)     LDL Cholesterol (mg/dL)   Date Value   08/02/2021 134 (H)     AST (U/L)   Date Value   12/13/2018 19     ALT (U/L)   Date Value   12/13/2018 16     BUN (mg/dL)   Date Value   10/23/2019 8      (goal A1C is < 7)   (goal LDL is <100) need 30-50% reduction from baseline     BP Readings from Last 3 Encounters:   08/02/21 139/86   11/20/19 (!) 166/98   10/23/19 (!) 152/82    (goal /80)      All Future Testing planned in [facility]:  Lab Frequency Next Occurrence   Comprehensive Metabolic Panel Once 97/95/4621   CBC With Auto Differential Once 11/16/2021   Vitamin D 25 Hydroxy Once 11/16/2021   PTH, Intact With Ionized Calcium Once 11/16/2021   EKG 12 lead Once 01/28/2022       Next Visit Date:  No future appointments.          Patient Active Problem List:     Fibroid     Lung nodule     Leiomyomatosis     Restrictive lung disease     Radiculopathy, cervical     Renal mass     DR (diabetic retinopathy) (HCC)     HTN (hypertension)     Type 2 diabetes mellitus with proliferative retinopathy of left eye, with long-term current use of insulin (HCC)     Dermatomyositis (HCC)     OA (osteoarthritis)     Hyperlipidemia     Anxiety and depression     Adnexal mass     Pelvic mass     Thyroid nodule     Exudative age-related macular degeneration of right eye with inactive scar (HCC)     Atrial flutter ([address]

## 2021-11-24 ENCOUNTER — TELEPHONE (OUTPATIENT)
Dept: INTERNAL MEDICINE | Age: 71
End: 2021-11-24

## 2021-11-24 NOTE — LETTER
[facility]  [facility] [address] [address]  Dept: [phone]      Jose Stallworth MD        11/24/2021 [facility]      Dear Anna Marie: This letter is a reminder that you may have diagnostic testing that has not been completed. It is important to your well-being that these test(s) are performed. Please call our office at Dept: [phone] for additional information on the outstanding tests or let us know if they have been completed so we may update your chart.     Sincerely,        Jose Stallworth MD

## 2022-01-01 DIAGNOSIS — I10 ESSENTIAL HYPERTENSION: ICD-10-CM

## 2022-01-01 DIAGNOSIS — N94.89 ADNEXAL MASS: ICD-10-CM

## 2022-01-01 RX ORDER — RIVAROXABAN 10 MG/1
10 TABLET, FILM COATED ORAL
Qty: 30 TABLET | Refills: 1 | OUTPATIENT
Start: 2022-01-01

## 2022-01-01 RX ORDER — LOVASTATIN 20 MG/1
20 TABLET ORAL NIGHTLY
Qty: 30 TABLET | Refills: 3 | OUTPATIENT
Start: 2022-01-01

## 2022-01-01 RX ORDER — PANTOPRAZOLE SODIUM 40 MG/1
TABLET, DELAYED RELEASE ORAL
Qty: 30 TABLET | Refills: 3 | OUTPATIENT
Start: 2022-01-01

## 2022-01-01 RX ORDER — LANCETS 33 GAUGE
EACH MISCELLANEOUS
Refills: 11 | OUTPATIENT
Start: 2022-01-01

## 2022-03-01 ENCOUNTER — TELEMEDICINE (OUTPATIENT)
Dept: INTERNAL MEDICINE | Age: 72
End: 2022-03-01
Payer: MEDICAID

## 2022-03-01 DIAGNOSIS — I10 ESSENTIAL HYPERTENSION: ICD-10-CM

## 2022-03-01 DIAGNOSIS — Z79.4 TYPE 2 DIABETES MELLITUS WITHOUT COMPLICATION, WITH LONG-TERM CURRENT USE OF INSULIN (HCC): ICD-10-CM

## 2022-03-01 DIAGNOSIS — Z12.31 ENCOUNTER FOR SCREENING MAMMOGRAM FOR BREAST CANCER: Primary | ICD-10-CM

## 2022-03-01 DIAGNOSIS — E11.9 TYPE 2 DIABETES MELLITUS WITHOUT COMPLICATION, UNSPECIFIED WHETHER LONG TERM INSULIN USE (HCC): ICD-10-CM

## 2022-03-01 DIAGNOSIS — D50.0 IRON DEFICIENCY ANEMIA DUE TO CHRONIC BLOOD LOSS: ICD-10-CM

## 2022-03-01 DIAGNOSIS — E11.9 TYPE 2 DIABETES MELLITUS WITHOUT COMPLICATION, WITH LONG-TERM CURRENT USE OF INSULIN (HCC): ICD-10-CM

## 2022-03-01 DIAGNOSIS — G47.10 EXCESSIVE SLEEPINESS: ICD-10-CM

## 2022-03-01 PROCEDURE — 99213 OFFICE O/P EST LOW 20 MIN: CPT | Performed by: INTERNAL MEDICINE

## 2022-03-01 RX ORDER — VIT C/E/ZN/COPPR/LUTEIN/ZEAXAN 250MG-90MG
1 CAPSULE ORAL DAILY
Qty: 90 TABLET | Refills: 3 | Status: SHIPPED | OUTPATIENT
Start: 2022-03-01 | End: 2022-08-09

## 2022-03-01 RX ORDER — PANTOPRAZOLE SODIUM 40 MG/1
40 TABLET, DELAYED RELEASE ORAL DAILY
Qty: 30 TABLET | Refills: 3 | Status: SHIPPED | OUTPATIENT
Start: 2022-03-01 | End: 2022-07-05

## 2022-03-01 RX ORDER — LANCETS 30 GAUGE
1 EACH MISCELLANEOUS DAILY
Qty: 100 EACH | Refills: 11 | Status: SHIPPED | OUTPATIENT
Start: 2022-03-01 | End: 2022-08-09

## 2022-03-01 RX ORDER — GLUCOSAMINE HCL/CHONDROITIN SU 500-400 MG
1 CAPSULE ORAL 3 TIMES DAILY
Qty: 100 STRIP | Refills: 11 | Status: SHIPPED | OUTPATIENT
Start: 2022-03-01 | End: 2022-08-09

## 2022-03-01 RX ORDER — PEN NEEDLE, DIABETIC 32GX 5/32"
NEEDLE, DISPOSABLE MISCELLANEOUS
Qty: 100 EACH | Refills: 11 | Status: SHIPPED | OUTPATIENT
Start: 2022-03-01 | End: 2022-08-09

## 2022-03-01 RX ORDER — LATANOPROST 50 UG/ML
SOLUTION/ DROPS OPHTHALMIC
COMMUNITY
Start: 2022-02-08 | End: 2022-08-09

## 2022-03-01 RX ORDER — BLOOD SUGAR DIAGNOSTIC
STRIP MISCELLANEOUS
Qty: 100 EACH | Refills: 11 | Status: SHIPPED | OUTPATIENT
Start: 2022-03-01 | End: 2022-08-09

## 2022-03-01 RX ORDER — LOVASTATIN 20 MG/1
20 TABLET ORAL NIGHTLY
Qty: 30 TABLET | Refills: 3 | Status: SHIPPED | OUTPATIENT
Start: 2022-03-01 | End: 2022-07-05

## 2022-03-01 SDOH — ECONOMIC STABILITY: FOOD INSECURITY: WITHIN THE PAST 12 MONTHS, YOU WORRIED THAT YOUR FOOD WOULD RUN OUT BEFORE YOU GOT MONEY TO BUY MORE.: NEVER TRUE

## 2022-03-01 SDOH — ECONOMIC STABILITY: FOOD INSECURITY: WITHIN THE PAST 12 MONTHS, THE FOOD YOU BOUGHT JUST DIDN'T LAST AND YOU DIDN'T HAVE MONEY TO GET MORE.: NEVER TRUE

## 2022-03-01 ASSESSMENT — PATIENT HEALTH QUESTIONNAIRE - PHQ9
5. POOR APPETITE OR OVEREATING: 0
SUM OF ALL RESPONSES TO PHQ QUESTIONS 1-9: 5
SUM OF ALL RESPONSES TO PHQ QUESTIONS 1-9: 5
4. FEELING TIRED OR HAVING LITTLE ENERGY: 3
SUM OF ALL RESPONSES TO PHQ9 QUESTIONS 1 & 2: 0
2. FEELING DOWN, DEPRESSED OR HOPELESS: 0
10. IF YOU CHECKED OFF ANY PROBLEMS, HOW DIFFICULT HAVE THESE PROBLEMS MADE IT FOR YOU TO DO YOUR WORK, TAKE CARE OF THINGS AT HOME, OR GET ALONG WITH OTHER PEOPLE: 0
3. TROUBLE FALLING OR STAYING ASLEEP: 2
9. THOUGHTS THAT YOU WOULD BE BETTER OFF DEAD, OR OF HURTING YOURSELF: 0
SUM OF ALL RESPONSES TO PHQ QUESTIONS 1-9: 5
8. MOVING OR SPEAKING SO SLOWLY THAT OTHER PEOPLE COULD HAVE NOTICED. OR THE OPPOSITE, BEING SO FIGETY OR RESTLESS THAT YOU HAVE BEEN MOVING AROUND A LOT MORE THAN USUAL: 0
6. FEELING BAD ABOUT YOURSELF - OR THAT YOU ARE A FAILURE OR HAVE LET YOURSELF OR YOUR FAMILY DOWN: 0
7. TROUBLE CONCENTRATING ON THINGS, SUCH AS READING THE NEWSPAPER OR WATCHING TELEVISION: 0
SUM OF ALL RESPONSES TO PHQ QUESTIONS 1-9: 5
1. LITTLE INTEREST OR PLEASURE IN DOING THINGS: 0

## 2022-03-01 ASSESSMENT — SOCIAL DETERMINANTS OF HEALTH (SDOH): HOW HARD IS IT FOR YOU TO PAY FOR THE VERY BASICS LIKE FOOD, HOUSING, MEDICAL CARE, AND HEATING?: NOT HARD AT ALL

## 2022-03-01 NOTE — PROGRESS NOTES
3/1/2022    TELEHEALTH EVALUATION -- Audio(During CVLJK-53 public health emergency)    Patient is evaluated via telephone on 2020. Consent:  The patient and/or health care decision maker is aware that that he may receive a bill for this telephone service, depending on his insurance coverage, and has provided verbal consent to proceed: Yes    HPI:    Anna Marie Villanueva (:  1950) has requested an audio evaluation for the following concern(s):     excessive tiredness and sleepiness. Her daughter just passed away and she does feel depressed, has low appetite and does not want to talk to any of her family. She has not checked her blood glucose in a while but is taking the same dose of insulin, her DM was previously uncontrolled however now she does not eat much     Previous h/o anemia requiring blood transfusion , last Hb checked in the ER was 9.3     Review of Systems    Prior to Visit Medications    Medication Sig Taking?  Authorizing Provider   latanoprost (XALATAN) 0.005 % ophthalmic solution INSTILL 1 DROP INTO BOTH EYES NIGHTLY Yes Historical Provider, MD   Multiple Vitamins-Minerals (PRESERVISION AREDS 2) CHEW Take 1 tablet by mouth daily Yes Senia Euceda MD   blood glucose monitor strips 1 strip by Other route 3 times daily Test___times daily    Diagnosis: 250.0   Diabetes Mellitus____Insulin Dependent____Non-Insulin Dependent Yes Senia Euceda MD   Lancets MISC 1 each by Does not apply route daily Use___times daily    Diagnisis:250.0  Diabetes Mellitus____Insulin Dependent___Non-Insulin Dependent Yes Senia Euceda MD   Alcohol Swabs (PRO COMFORT ALCOHOL) 70 % PADS USE AS DIRECTED THREE TIMES A DAY Yes Susan Oh MD   Insulin Syringe-Needle U-100 (ULTICARE INSULIN SYRINGE) 31G X 5/16\" 1 ML MISC USE AS DIRECTED TWICE A DAY Yes Senia Euceda MD   lovastatin (MEVACOR) 20 MG tablet Take 1 tablet by mouth nightly Yes Senia Euceda MD   pantoprazole (PROTONIX) 40 MG tablet Take 1 tablet by mouth daily Yes Risa Orellana MD   blood glucose monitor kit and supplies Dispense sufficient amount for indicated testing frequency plus additional to accommodate PRN testing needs. Dispense all needed supplies to include: monitor, strips, lancing device, lancets, control solutions, alcohol swabs. Yes Risa Orellana MD   gabapentin (NEURONTIN) 300 MG capsule Take 1 capsule by mouth 3 times daily for 90 days. Yes Risa Orellana MD   traMADol (ULTRAM) 50 MG tablet 50 mg as needed for Pain. Yes Historical Provider, MD   insulin glargine (LANTUS) 100 UNIT/ML injection vial Inject 50 Units into the skin nightly Yes Risa Orellana MD   sodium chloride (ALTAMIST SPRAY) 0.65 % nasal spray 1 spray by Nasal route as needed for Congestion  Patient not taking: Reported on 3/1/2022  Risa Orellana MD   Multiple Vitamins-Minerals (PRESERVISION AREDS 2) CAPS Take 2 capsules by mouth daily  Patient not taking: Reported on 3/1/2022  Risa Orellana MD   lisinopril (PRINIVIL;ZESTRIL) 20 MG tablet Take 1 tablet by mouth daily Cancel Lisinopril 5 mg and 10 mg prescription  Patient not taking: Reported on 3/1/2022  Risa Orellana MD       Social History     Tobacco Use    Smoking status: Never Smoker    Smokeless tobacco: Never Used   Vaping Use    Vaping Use: Never used   Substance Use Topics    Alcohol use: No    Drug use: No            RECORD REVIEW: Previous medical records were reviewed at today's visit.     PHYSICAL EXAMINATION:    Vital Signs: (As obtained by patient/caregiver at home)      Constitutional: [] Appears well-developed and well-nourished [] No apparent distress      [] Abnormal   Mental status  [] Alert and awake  [] Oriented to person/place/time []Able to follow commands        Pulmonary/Chest: [] Respiratory effort normal.  [] No visualized signs of difficulty breathing or respiratory distress        [] Abnormal              Psychiatric:       [] Normal [] Abnormal        [] No Hallucinations    Other pertinent observable physical exam findings:-              RECORD REVIEW: Previous medical records were reviewed at today's visit. The past family, medical and social histories were reviewed and unchanged with the exceptions of what is mentioned in this note. Due to this being a TeleHealth encounter, evaluation of the following organ systems is limited: Vitals/Constitutional/EENT/Resp/CV/GI//MS/Neuro/Skin/Heme-Lymph-Imm. ASSESSMENT/PLAN:  1. Type 2 diabetes mellitus without complication, unspecified whether long term insulin use (HCC)    - Hemoglobin A1C; Future  - Multiple Vitamins-Minerals (PRESERVISION AREDS 2) CHEW; Take 1 tablet by mouth daily  Dispense: 90 tablet; Refill: 3  - blood glucose monitor strips; 1 strip by Other route 3 times daily Test___times daily    Diagnosis: 250.0   Diabetes Mellitus____Insulin Dependent____Non-Insulin Dependent  Dispense: 100 strip; Refill: 11  - Lancets MISC; 1 each by Does not apply route daily Use___times daily    Diagnisis:250.0  Diabetes Mellitus____Insulin Dependent___Non-Insulin Dependent  Dispense: 100 each; Refill: 11  - Alcohol Swabs (PRO COMFORT ALCOHOL) 70 % PADS; USE AS DIRECTED THREE TIMES A DAY  Dispense: 100 each; Refill: 11  - blood glucose monitor kit and supplies; Dispense sufficient amount for indicated testing frequency plus additional to accommodate PRN testing needs. Dispense all needed supplies to include: monitor, strips, lancing device, lancets, control solutions, alcohol swabs. Dispense: 1 kit; Refill: 0    2. Type 2 diabetes mellitus without complication, with long-term current use of insulin (HCC)    - Insulin Syringe-Needle U-100 (ULTICARE INSULIN SYRINGE) 31G X 5/16\" 1 ML MISC; USE AS DIRECTED TWICE A DAY  Dispense: 100 each; Refill: 11    3. Essential hypertension    - lovastatin (MEVACOR) 20 MG tablet; Take 1 tablet by mouth nightly  Dispense: 30 tablet;  Refill: 3  - pantoprazole (PROTONIX) 40 MG tablet; Take 1 tablet by mouth daily  Dispense: 30 tablet; Refill: 3    4. Encounter for screening mammogram for breast cancer    - [facility] Digital Screen Bilateral [[id number]]; Future    5. Excessive sleepiness    - TSH With Reflex Ft4; Future  - Basic Metabolic Panel; Future  - Magnesium; Future  - Phosphorus; Future    6. Iron deficiency anemia due to chronic blood loss  - [facility], [facility]  - CBC with Auto Differential; Future  - Iron and TIBC; Future  - Ferritin; Future    Plan :   Check, anemia, thyroid function test and BMP   Advised to monitor blood glucose   Depression counselling provided she does not want to start medication at this time       Total Time spent 25 min     No follow-ups on file. An  electronic signature was used to authenticate this note. --Mortimer Heft, MD on 3/1/2022 at 1:44 PM    9}    Pursuant to the emergency declaration under the [facility] waiver authority and the [employer] Act, this Virtual  Visit was conducted, with patient's consent, to reduce the patient's risk of exposure to COVID-19 and provide continuity of care for an established patient. Services were provided through a telephone discussion virtually to substitute for in-person clinic visit.

## 2022-03-01 NOTE — PATIENT INSTRUCTIONS
Medications e-scribe to pharmacy of pt's choice. Script for lab given to pt, no fasting required. Pt will get labs done before next appt. Script for Mammogram and instructions for NANDINI given to pt, pt will call [phone] and schedule appt. An After Visit Summary was printed and given to the patient.   CB

## 2022-03-14 DIAGNOSIS — I10 ESSENTIAL HYPERTENSION: ICD-10-CM

## 2022-03-14 RX ORDER — PANTOPRAZOLE SODIUM 40 MG/1
40 TABLET, DELAYED RELEASE ORAL DAILY
Qty: 30 TABLET | Refills: 3 | OUTPATIENT
Start: 2022-03-14

## 2022-03-14 RX ORDER — LOVASTATIN 20 MG/1
20 TABLET ORAL NIGHTLY
Qty: 30 TABLET | Refills: 3 | OUTPATIENT
Start: 2022-03-14

## 2022-04-12 ENCOUNTER — TELEPHONE (OUTPATIENT)
Dept: INTERNAL MEDICINE | Age: 72
End: 2022-04-12

## 2022-04-12 DIAGNOSIS — D64.9 SEVERE ANEMIA: ICD-10-CM

## 2022-04-12 DIAGNOSIS — C79.9 METASTATIC CARCINOMA (HCC): Primary | ICD-10-CM

## 2022-04-15 DIAGNOSIS — N94.89 ADNEXAL MASS: Primary | ICD-10-CM

## 2022-04-18 ENCOUNTER — TELEPHONE (OUTPATIENT)
Dept: ONCOLOGY | Age: 72
End: 2022-04-18

## 2022-04-18 ENCOUNTER — TELEPHONE (OUTPATIENT)
Dept: INTERNAL MEDICINE | Age: 72
End: 2022-04-18

## 2022-04-18 NOTE — TELEPHONE ENCOUNTER
Patient wanting to inform PCP \"that she was admitted into [facility] on 4/16/2022; Bleeding from rectum; Should be discharged today or tomorrow. \"

## 2022-04-18 NOTE — TELEPHONE ENCOUNTER
Dr. Amy Cabezas placed referral for oncology navigation. Name: Anna Marie Villanueva  : 1950  MRN: [id number]    Oncology Navigation- Initial Note:    Intake-  Contact Type: Telephone  Notes: Attempted to contact pt, no answer, unable to leave VM r/t VM asked for access code. Will continue to follow.     Electronically signed by Ketan Ramos RN on 2022 at 11:46 AM

## 2022-04-18 NOTE — TELEPHONE ENCOUNTER
----- Message from [address] [facility] [address] sent at 4/18/2022  1:41 PM EDT -----  Subject: Message to Provider    QUESTIONS  Information for Provider? Pt. Anna Marie Villanueva is calling to let provider   know that she was admitted into [facility] on 4/16/2022; Bleeding from rectum; Should be discharged today or tomorrow. ---------------------------------------------------------------------------  --------------  Deannie Sandhoff INFO  What is the best way for the office to contact you? OK to leave message on   voicemail  Preferred Call Back Phone Number? [phone]  ---------------------------------------------------------------------------  --------------  SCRIPT ANSWERS  Relationship to Patient?  Self

## 2022-04-20 ENCOUNTER — TELEPHONE (OUTPATIENT)
Dept: ONCOLOGY | Age: 72
End: 2022-04-20

## 2022-04-20 NOTE — TELEPHONE ENCOUNTER
Dr. Amy Cabezas alerted writer pt canceled today's consultation & requested writer attempt to contact pt. Name: Anna Marie Villanueva  : 1950  MRN: [id number]    Oncology Navigation- Initial Note:    Intake-  Contact Type: Telephone  Notes: Introductory phone call made to patient, instructed patient Dr. Amy Cabezas requested oncology navigation & Kurtis Hernandez will be navigating care. Inquired on today's canceled appt. Pt stated recently admitted @  [facility] - r/t rectal bleeding, received blood transfusion, & too weak to attend Dr. Amy Cabezas consultation today. Inquired on rescheduling. Pt stated \"I want to see my family doctor first\" & c/o weakness/fatigue. Support given & stressed importance of Dr. Amy Cabezas consultation. Inquired if [facility] facility more convenient, pt stated yes. Inquired on scheduling consultation  @ [facility], pt agreeable. Spoke with [facility], [facility] , updated on pt & requested appt. Pt scheduled  @ 1145. Pt updated on appt details & verbalized understanding. Discussed potential barriers to care, pt stated using cane/walker. Pt stated uses cab service via medical insurance for transportation & lives w/friend. Pt requested daughter, Annabel Stover, listed as emergency contact & daughter Skippy Salm removed r/t Brisa Bravo passed away 2021. Support given & chart updated. Inquired on smoking hx, pt stated never smoked. Inquired on alcohol/drug use, pt denied any. Instructed pt may contact writer cooper @ [phone]. Dr. Amy Cabezas updated. Will continue to follow.     Electronically signed by Ketan Ramos RN on 2022 at 10:46 AM

## 2022-04-21 ENCOUNTER — HOSPITAL ENCOUNTER (OUTPATIENT)
Facility: MEDICAL CENTER | Age: 72
End: 2022-04-21

## 2022-04-25 ENCOUNTER — TELEPHONE (OUTPATIENT)
Dept: INTERNAL MEDICINE | Age: 72
End: 2022-04-25

## 2022-04-25 NOTE — LETTER
[facility] [address]  [address] [address]  Phone: [phone]  Fax: [phone]    Wojciech Nowak MD        April 25, 2022 June JULIA Villanueva  [address] [place] [id number]      Dear Anna Marie: This letter is a reminder that you may have diagnostic Mammogram that has not been completed. It is important to your well-being that this test is performed. Please find the outstanding order attached. You can have the test completed at [facility]. [facility] or [facility]. Please see the order for scheduling instructions. Please call [phone] to schedule an appointment. We are also now offering service at our [facility] for more information or to schedule your mammogram call [phone])  Please call our office at Dept: [phone] for additional information on the outstanding test or let us know if they have been completed so we may update your chart. If you have any questions or concerns, please don't hesitate to call.     Sincerely,        Wojciech Nowak MD

## 2022-04-29 ENCOUNTER — TELEPHONE (OUTPATIENT)
Dept: ONCOLOGY | Age: 72
End: 2022-04-29

## 2022-05-02 ENCOUNTER — TELEPHONE (OUTPATIENT)
Dept: ONCOLOGY | Age: 72
End: 2022-05-02

## 2022-05-02 NOTE — TELEPHONE ENCOUNTER
Name: Anna Marie Villanueva  : 1950  MRN: [id number]    Oncology Navigation Follow-Up Note    Contact Type:  Telephone  Notes:  Upon review of chart noted pt no show for  Dr. Nathaly Astudillo consult. Spoke with pt for f/u call to inquire on no show & rescheduling appt. Pt stated \"I just couldn't get out of bed that day\". Pt stated recently not eating much r/t depression from daughter's passing. Support given. Inquired if pt agreeable to rescheduling Dr. Nathaly Astudillo consult  @ [facility], pt stated yes. Spoke with Matilde Peña, [facility] , updated on pt & requested consult appt. Pt scheduled  @ 0915. Pt updated on appt details. Pt verbalized understanding & denied questions/concerns. Pt stated will contact cab service to schedule transportation. Will continue to follow.     Electronically signed by Leo Weldon RN on 2022 at 2:36 PM

## 2022-05-06 ENCOUNTER — TELEPHONE (OUTPATIENT)
Dept: ONCOLOGY | Age: 72
End: 2022-05-06

## 2022-07-05 DIAGNOSIS — I10 ESSENTIAL HYPERTENSION: ICD-10-CM

## 2022-07-05 RX ORDER — PANTOPRAZOLE SODIUM 40 MG/1
40 TABLET, DELAYED RELEASE ORAL DAILY
Qty: 30 TABLET | Refills: 3 | Status: SHIPPED | OUTPATIENT
Start: 2022-07-05 | End: 2022-08-09

## 2022-07-05 RX ORDER — LOVASTATIN 20 MG/1
20 TABLET ORAL NIGHTLY
Qty: 30 TABLET | Refills: 3 | Status: SHIPPED | OUTPATIENT
Start: 2022-07-05 | End: 2022-08-09

## 2022-07-05 NOTE — TELEPHONE ENCOUNTER
E-scribing request for 2 medications. Pt has future appt. Health Maintenance   Topic Date Due    Colorectal Cancer Screen  08/08/2019    Diabetic retinal exam  10/28/2020    Breast cancer screen  10/16/2021    COVID-19 Vaccine (3 - Moderna risk 4-dose series) 10/30/2021    A1C test (Diabetic or Prediabetic)  11/02/2021    Diabetic microalbuminuria test  08/02/2022    Lipids  08/02/2022    Diabetic foot exam  08/02/2022    Flu vaccine (1) 09/01/2022    DTaP/Tdap/Td vaccine (2 - Td or Tdap) 02/01/2023    Depression Monitoring  03/01/2023    DEXA (modify frequency per FRAX score)  Completed    Shingles vaccine  Completed    Pneumococcal 65+ years Vaccine  Completed    Hepatitis C screen  Completed    Hepatitis A vaccine  Aged Out    Hib vaccine  Aged Out    Meningococcal (ACWY) vaccine  Aged Out             (applicable per patient's age: Cancer Screenings, Depression Screening, Fall Risk Screening, Immunizations)    Hemoglobin A1C (%)   Date Value   08/02/2021 10.8   11/07/2019 7.6   06/24/2019 7.3     Microalb/Crt.  Ratio (mcg/mg creat)   Date Value   08/02/2021 125 (H)     LDL Cholesterol (mg/dL)   Date Value   08/02/2021 134 (H)     AST (IU/L)   Date Value   04/16/2022 10 (L)     ALT (IU/L)   Date Value   04/16/2022 6 (L)     BUN (mg/dL)   Date Value   04/16/2022 12      (goal A1C is < 7)   (goal LDL is <100) need 30-50% reduction from baseline     BP Readings from Last 3 Encounters:   08/02/21 139/86   11/20/19 (!) 166/98   10/23/19 (!) 152/82    (goal /80)      All Future Testing planned in [facility]:  Lab Frequency Next Occurrence   EKG 12 lead Once 05/07/2022   NANDINI Digital Screen Bilateral [[id number]] Once 07/25/2022   Hemoglobin A1C Once 06/09/2022   TSH With Reflex Ft4 Once 64/01/3521   Basic Metabolic Panel Once 62/02/4541   CBC with Auto Differential Once 06/09/2022   Iron and TIBC Once 06/09/2022   Ferritin Once 06/11/2022   Magnesium Once 06/09/2022   Phosphorus Once 06/11/2022

## 2022-07-12 ENCOUNTER — TELEPHONE (OUTPATIENT)
Dept: INTERNAL MEDICINE | Age: 72
End: 2022-07-12

## 2022-07-12 NOTE — LETTER
[facility] [address]  [address] [address]  Phone: [phone]  Fax: [phone]    Suri Flynn MD        July 12, 2022     Anna Marie JULIA Villanueva  [address] [place] [address]      Dear Anna Marie: We missed seeing you for a scheduled appointment at [address] [place] [address] with Suri Flynn MD on 7/12/2022. We're sorry you were unable to keep your appointment and hope that you are doing well. We ask that you please call 24 hours in advance if you are unable to make your appointment, so that we can give that time to another patient in need. We care about you and the management of your healthcare and want to make sure that you follow up as recommended. To provide quality care and timely appointments to all our patients, you may be dismissed from the practice if you do not show for three (3) scheduled appointments within a 12-month period. We would like to continue treating your healthcare needs. Please call the office to reschedule your appointment, if needed.      Sincerely,        Suri Flynn MD

## 2022-08-04 ENCOUNTER — HOSPITAL ENCOUNTER (OUTPATIENT)
Age: 72
Setting detail: SPECIMEN
Discharge: HOME | End: 2022-08-04

## 2022-08-04 LAB
ALBUMIN SERPL-MCNC: 3.3 G/DL (ref 3.5–5.2)
ALBUMIN/GLOBULIN RATIO: 0.8 (ref 1–2.5)
ALP BLD-CCNC: 57 U/L (ref 35–104)
ALT SERPL-CCNC: 6 U/L (ref 5–33)
ANION GAP SERPL CALCULATED.3IONS-SCNC: 9 MMOL/L (ref 9–17)
AST SERPL-CCNC: 12 U/L
BILIRUB SERPL-MCNC: 0.38 MG/DL (ref 0.3–1.2)
BUN BLDV-MCNC: 9 MG/DL (ref 8–23)
CALCIUM SERPL-MCNC: 9 MG/DL (ref 8.6–10.4)
CHLORIDE BLD-SCNC: 97 MMOL/L (ref 98–107)
CO2: 27 MMOL/L (ref 20–31)
CREAT SERPL-MCNC: 0.59 MG/DL (ref 0.5–0.9)
GFR AFRICAN AMERICAN: >60 ML/MIN
GFR NON-AFRICAN AMERICAN: >60 ML/MIN
GFR SERPL CREATININE-BSD FRML MDRD: ABNORMAL ML/MIN/{1.73_M2}
GLUCOSE BLD-MCNC: 98 MG/DL (ref 70–99)
HCT VFR BLD CALC: 27.3 % (ref 36.3–47.1)
HEMOGLOBIN: 7.5 G/DL (ref 11.9–15.1)
INR BLD: 1.1
MAGNESIUM: 2.3 MG/DL (ref 1.6–2.6)
MCH RBC QN AUTO: 18.7 PG (ref 25.2–33.5)
MCHC RBC AUTO-ENTMCNC: 27.5 G/DL (ref 28.4–34.8)
MCV RBC AUTO: 67.9 FL (ref 82.6–102.9)
NRBC AUTOMATED: 0 PER 100 WBC
PDW BLD-RTO: 25.4 % (ref 11.8–14.4)
PHOSPHORUS: 2.3 MG/DL (ref 2.6–4.5)
PLATELET # BLD: 382 K/UL (ref 138–453)
PMV BLD AUTO: 10 FL (ref 8.1–13.5)
POTASSIUM SERPL-SCNC: 4.1 MMOL/L (ref 3.7–5.3)
PROTHROMBIN TIME: 11.2 SEC (ref 9.1–12.3)
RBC # BLD: 4.02 M/UL (ref 3.95–5.11)
SODIUM BLD-SCNC: 133 MMOL/L (ref 135–144)
TOTAL PROTEIN: 7.4 G/DL (ref 6.4–8.3)
WBC # BLD: 9.2 K/UL (ref 3.5–11.3)

## 2022-08-04 PROCEDURE — 36415 COLL VENOUS BLD VENIPUNCTURE: CPT

## 2022-08-04 PROCEDURE — 84100 ASSAY OF PHOSPHORUS: CPT

## 2022-08-04 PROCEDURE — 85027 COMPLETE CBC AUTOMATED: CPT

## 2022-08-04 PROCEDURE — 80053 COMPREHEN METABOLIC PANEL: CPT

## 2022-08-04 PROCEDURE — 83735 ASSAY OF MAGNESIUM: CPT

## 2022-08-04 PROCEDURE — 85610 PROTHROMBIN TIME: CPT

## 2022-08-04 PROCEDURE — P9603 ONE-WAY ALLOW PRORATED MILES: HCPCS

## 2022-08-05 ENCOUNTER — HOSPITAL ENCOUNTER (OUTPATIENT)
Age: 72
Setting detail: SPECIMEN
Discharge: HOME | End: 2022-08-05

## 2022-08-05 LAB
HCT VFR BLD CALC: 27.6 % (ref 36.3–47.1)
HEMOGLOBIN: 7.6 G/DL (ref 11.9–15.1)

## 2022-08-05 PROCEDURE — 85018 HEMOGLOBIN: CPT

## 2022-08-05 PROCEDURE — P9603 ONE-WAY ALLOW PRORATED MILES: HCPCS

## 2022-08-05 PROCEDURE — 36415 COLL VENOUS BLD VENIPUNCTURE: CPT

## 2022-08-05 PROCEDURE — 85014 HEMATOCRIT: CPT

## 2022-08-08 ENCOUNTER — HOSPITAL ENCOUNTER (OUTPATIENT)
Age: 72
Setting detail: SPECIMEN
Discharge: HOME | End: 2022-08-08

## 2022-08-08 LAB
HCT VFR BLD CALC: 25.6 % (ref 36.3–47.1)
HEMOGLOBIN: 7.5 G/DL (ref 11.9–15.1)

## 2022-08-08 PROCEDURE — 85018 HEMOGLOBIN: CPT

## 2022-08-08 PROCEDURE — 85014 HEMATOCRIT: CPT

## 2022-08-08 PROCEDURE — 36415 COLL VENOUS BLD VENIPUNCTURE: CPT

## 2022-08-08 PROCEDURE — P9603 ONE-WAY ALLOW PRORATED MILES: HCPCS

## 2022-08-09 ENCOUNTER — HOSPITAL ENCOUNTER (INPATIENT)
Age: 72
LOS: 7 days | Discharge: HOME | DRG: 530 | End: 2022-08-16
Attending: EMERGENCY MEDICINE | Admitting: INTERNAL MEDICINE
Payer: MEDICAID

## 2022-08-09 ENCOUNTER — APPOINTMENT (OUTPATIENT)
Dept: GENERAL RADIOLOGY | Age: 72
DRG: 530 | End: 2022-08-09
Payer: MEDICAID

## 2022-08-09 DIAGNOSIS — C54.1 ENDOMETRIAL CANCER (HCC): ICD-10-CM

## 2022-08-09 DIAGNOSIS — K92.2 LOWER GI BLEED: Primary | ICD-10-CM

## 2022-08-09 DIAGNOSIS — R10.84 GENERALIZED ABDOMINAL PAIN: ICD-10-CM

## 2022-08-09 DIAGNOSIS — I10 ESSENTIAL HYPERTENSION: ICD-10-CM

## 2022-08-09 LAB
ABSOLUTE EOS #: 0.18 K/UL (ref 0–0.4)
ABSOLUTE LYMPH #: 1.29 K/UL (ref 1–4.8)
ABSOLUTE MONO #: 0.74 K/UL (ref 0.1–1.3)
ANION GAP SERPL CALCULATED.3IONS-SCNC: 6 MMOL/L (ref 9–17)
BASOPHILS # BLD: 2 % (ref 0–2)
BASOPHILS ABSOLUTE: 0.18 K/UL (ref 0–0.2)
BUN BLDV-MCNC: 10 MG/DL (ref 8–23)
CALCIUM SERPL-MCNC: 9.2 MG/DL (ref 8.6–10.4)
CHLORIDE BLD-SCNC: 100 MMOL/L (ref 98–107)
CO2: 27 MMOL/L (ref 20–31)
CREAT SERPL-MCNC: 0.69 MG/DL (ref 0.5–0.9)
EOSINOPHILS RELATIVE PERCENT: 2 % (ref 0–4)
GFR AFRICAN AMERICAN: >60 ML/MIN
GFR NON-AFRICAN AMERICAN: >60 ML/MIN
GFR SERPL CREATININE-BSD FRML MDRD: ABNORMAL ML/MIN/{1.73_M2}
GLUCOSE BLD-MCNC: 206 MG/DL (ref 70–99)
HCT VFR BLD CALC: 22 % (ref 36–46)
HEMOGLOBIN: 7.2 G/DL (ref 12–16)
INR BLD: 1.1
LYMPHOCYTES # BLD: 14 % (ref 24–44)
MCH RBC QN AUTO: 20.2 PG (ref 26–34)
MCHC RBC AUTO-ENTMCNC: 32.8 G/DL (ref 31–37)
MCV RBC AUTO: 61.5 FL (ref 80–100)
MONOCYTES # BLD: 8 % (ref 1–7)
MORPHOLOGY: ABNORMAL
PARTIAL THROMBOPLASTIN TIME: 31.6 SEC (ref 24–36)
PDW BLD-RTO: 26 % (ref 11.5–14.9)
PLATELET # BLD: 429 K/UL (ref 150–450)
PMV BLD AUTO: 7 FL (ref 6–12)
POTASSIUM SERPL-SCNC: 4 MMOL/L (ref 3.7–5.3)
PRO-BNP: 57 PG/ML
PROTHROMBIN TIME: 14.2 SEC (ref 11.8–14.6)
RBC # BLD: 3.58 M/UL (ref 4–5.2)
SEG NEUTROPHILS: 74 % (ref 36–66)
SEGMENTED NEUTROPHILS ABSOLUTE COUNT: 6.81 K/UL (ref 1.3–9.1)
SODIUM BLD-SCNC: 133 MMOL/L (ref 135–144)
TROPONIN, HIGH SENSITIVITY: 19 NG/L (ref 0–14)
TROPONIN, HIGH SENSITIVITY: 21 NG/L (ref 0–14)
WBC # BLD: 9.2 K/UL (ref 3.5–11)

## 2022-08-09 PROCEDURE — 85045 AUTOMATED RETICULOCYTE COUNT: CPT

## 2022-08-09 PROCEDURE — 83880 ASSAY OF NATRIURETIC PEPTIDE: CPT

## 2022-08-09 PROCEDURE — 85730 THROMBOPLASTIN TIME PARTIAL: CPT

## 2022-08-09 PROCEDURE — 6370000000 HC RX 637 (ALT 250 FOR IP): Performed by: NURSE PRACTITIONER

## 2022-08-09 PROCEDURE — 99285 EMERGENCY DEPT VISIT HI MDM: CPT

## 2022-08-09 PROCEDURE — 84484 ASSAY OF TROPONIN QUANT: CPT

## 2022-08-09 PROCEDURE — 2060000000 HC ICU INTERMEDIATE R&B

## 2022-08-09 PROCEDURE — 99223 1ST HOSP IP/OBS HIGH 75: CPT | Performed by: INTERNAL MEDICINE

## 2022-08-09 PROCEDURE — 86850 RBC ANTIBODY SCREEN: CPT

## 2022-08-09 PROCEDURE — 71045 X-RAY EXAM CHEST 1 VIEW: CPT

## 2022-08-09 PROCEDURE — 85610 PROTHROMBIN TIME: CPT

## 2022-08-09 PROCEDURE — 85025 COMPLETE CBC W/AUTO DIFF WBC: CPT

## 2022-08-09 PROCEDURE — 93005 ELECTROCARDIOGRAM TRACING: CPT

## 2022-08-09 PROCEDURE — 86901 BLOOD TYPING SEROLOGIC RH(D): CPT

## 2022-08-09 PROCEDURE — 2580000003 HC RX 258: Performed by: NURSE PRACTITIONER

## 2022-08-09 PROCEDURE — 80048 BASIC METABOLIC PNL TOTAL CA: CPT

## 2022-08-09 PROCEDURE — 86920 COMPATIBILITY TEST SPIN: CPT

## 2022-08-09 PROCEDURE — 36415 COLL VENOUS BLD VENIPUNCTURE: CPT

## 2022-08-09 PROCEDURE — 86900 BLOOD TYPING SEROLOGIC ABO: CPT

## 2022-08-09 RX ORDER — SODIUM CHLORIDE 0.9 % (FLUSH) 0.9 %
5-40 SYRINGE (ML) INJECTION PRN
Status: DISCONTINUED | OUTPATIENT
Start: 2022-08-09 | End: 2022-08-16 | Stop reason: HOSPADM

## 2022-08-09 RX ORDER — ONDANSETRON 2 MG/ML
4 INJECTION INTRAMUSCULAR; INTRAVENOUS EVERY 6 HOURS PRN
Status: DISCONTINUED | OUTPATIENT
Start: 2022-08-09 | End: 2022-08-16 | Stop reason: HOSPADM

## 2022-08-09 RX ORDER — SODIUM CHLORIDE 9 MG/ML
INJECTION, SOLUTION INTRAVENOUS PRN
Status: DISCONTINUED | OUTPATIENT
Start: 2022-08-09 | End: 2022-08-16 | Stop reason: HOSPADM

## 2022-08-09 RX ORDER — PANTOPRAZOLE SODIUM 40 MG/1
40 TABLET, DELAYED RELEASE ORAL 2 TIMES DAILY
Status: ON HOLD | COMMUNITY
End: 2022-08-15 | Stop reason: HOSPADM

## 2022-08-09 RX ORDER — GABAPENTIN 300 MG/1
300 CAPSULE ORAL 3 TIMES DAILY
Status: DISCONTINUED | OUTPATIENT
Start: 2022-08-09 | End: 2022-08-16 | Stop reason: HOSPADM

## 2022-08-09 RX ORDER — FERROUS SULFATE 325(65) MG
325 TABLET ORAL 2 TIMES DAILY
COMMUNITY

## 2022-08-09 RX ORDER — OXYCODONE HYDROCHLORIDE AND ACETAMINOPHEN 5; 325 MG/1; MG/1
1 TABLET ORAL EVERY 6 HOURS PRN
Status: ON HOLD | COMMUNITY
End: 2022-08-15 | Stop reason: SDUPTHER

## 2022-08-09 RX ORDER — ATORVASTATIN CALCIUM 10 MG/1
10 TABLET, FILM COATED ORAL DAILY
Status: DISCONTINUED | OUTPATIENT
Start: 2022-08-10 | End: 2022-08-16 | Stop reason: HOSPADM

## 2022-08-09 RX ORDER — FERROUS SULFATE 325(65) MG
325 TABLET ORAL 2 TIMES DAILY
Status: DISCONTINUED | OUTPATIENT
Start: 2022-08-09 | End: 2022-08-16 | Stop reason: HOSPADM

## 2022-08-09 RX ORDER — SODIUM CHLORIDE 9 MG/ML
INJECTION, SOLUTION INTRAVENOUS CONTINUOUS
Status: DISCONTINUED | OUTPATIENT
Start: 2022-08-09 | End: 2022-08-12

## 2022-08-09 RX ORDER — SODIUM CHLORIDE 0.9 % (FLUSH) 0.9 %
5-40 SYRINGE (ML) INJECTION EVERY 12 HOURS SCHEDULED
Status: DISCONTINUED | OUTPATIENT
Start: 2022-08-09 | End: 2022-08-16 | Stop reason: HOSPADM

## 2022-08-09 RX ORDER — PANTOPRAZOLE SODIUM 40 MG/1
40 TABLET, DELAYED RELEASE ORAL 2 TIMES DAILY
Status: DISCONTINUED | OUTPATIENT
Start: 2022-08-09 | End: 2022-08-09 | Stop reason: SDUPTHER

## 2022-08-09 RX ORDER — ONDANSETRON 4 MG/1
4 TABLET, ORALLY DISINTEGRATING ORAL EVERY 8 HOURS PRN
Status: DISCONTINUED | OUTPATIENT
Start: 2022-08-09 | End: 2022-08-16 | Stop reason: HOSPADM

## 2022-08-09 RX ORDER — ACETAMINOPHEN 650 MG/1
650 SUPPOSITORY RECTAL EVERY 6 HOURS PRN
Status: DISCONTINUED | OUTPATIENT
Start: 2022-08-09 | End: 2022-08-16 | Stop reason: HOSPADM

## 2022-08-09 RX ORDER — ACETAMINOPHEN 325 MG/1
650 TABLET ORAL EVERY 6 HOURS PRN
Status: DISCONTINUED | OUTPATIENT
Start: 2022-08-09 | End: 2022-08-16 | Stop reason: HOSPADM

## 2022-08-09 RX ORDER — ATORVASTATIN CALCIUM 10 MG/1
10 TABLET, FILM COATED ORAL DAILY
COMMUNITY

## 2022-08-09 RX ADMIN — SODIUM CHLORIDE, PRESERVATIVE FREE 10 ML: 5 INJECTION INTRAVENOUS at 22:44

## 2022-08-09 RX ADMIN — SODIUM CHLORIDE: 9 INJECTION, SOLUTION INTRAVENOUS at 22:52

## 2022-08-09 RX ADMIN — FERROUS SULFATE TAB 325 MG (65 MG ELEMENTAL FE) 325 MG: 325 (65 FE) TAB at 22:45

## 2022-08-09 RX ADMIN — GABAPENTIN 300 MG: 300 CAPSULE ORAL at 22:45

## 2022-08-09 ASSESSMENT — ENCOUNTER SYMPTOMS
PHOTOPHOBIA: 0
BLOOD IN STOOL: 1
SHORTNESS OF BREATH: 1
NAUSEA: 0
ABDOMINAL PAIN: 0
WHEEZING: 0
EYE REDNESS: 0
BACK PAIN: 0
VOMITING: 0
RHINORRHEA: 0

## 2022-08-09 ASSESSMENT — LIFESTYLE VARIABLES
HOW OFTEN DO YOU HAVE A DRINK CONTAINING ALCOHOL: NEVER
HOW MANY STANDARD DRINKS CONTAINING ALCOHOL DO YOU HAVE ON A TYPICAL DAY: PATIENT DOES NOT DRINK
HOW OFTEN DO YOU HAVE A DRINK CONTAINING ALCOHOL: NEVER
HOW MANY STANDARD DRINKS CONTAINING ALCOHOL DO YOU HAVE ON A TYPICAL DAY: PATIENT DOES NOT DRINK

## 2022-08-09 ASSESSMENT — PAIN SCALES - GENERAL: PAINLEVEL_OUTOF10: 0

## 2022-08-09 ASSESSMENT — PAIN - FUNCTIONAL ASSESSMENT: PAIN_FUNCTIONAL_ASSESSMENT: 0-10

## 2022-08-09 NOTE — ED PROVIDER NOTES
[facility]  Emergency Department Encounter  Emergency Medicine Resident     Pt Toby Paget  MRN: [id number]  [place] 1950  Date of evaluation: 22  PCP:  Edinson Gonzales MD      [address]       Chief Complaint   Patient presents with    GI Problem     ECF staff notice bright red blood in stool this PM.  Patient's lab indicated low hemoglobin yesterday. HISTORY OF PRESENT ILLNESS  (Location/Symptom, Timing/Onset, Context/Setting, Quality, Duration, Modifying Factors, Severity.)      Anna Marie Villanueva is a 67 y.o. female who presents with complaints of 1 episode of rectal bleeding that started approximately 2 hours prior to arrival with some oozing since then. Patient denied any vomiting of blood. No abdominal pain. Patient notes baseline shortness of breath. Does not use home oxygen. Has been noted to have pelvic mass as well as lung masses suspicious for metastatic disease and follows with Dr. Mare Garcia. Is not take any blood thinners. Has had no-shows with Dr. Ave Mancini most recently in May 2022. No chest pain. Recent CT abdomen pelvis 2022 showing small ascites right upper quadrant, gallbladder sludge, no other acute abnormality. Recent history of pelvic mass and bilateral lung masses suspicious for metastatic disease. Recent admission to [facility] for vaginal and lower GI bleed requiring transfusion  PAST MEDICAL / SURGICAL / SOCIAL / FAMILY HISTORY      has a past medical history of Anxiety and depression, Anxiety and depression, Fibroid, Hyperlipidemia, Hypertension, Leiomyomatosis, Lung nodule, Obesity, Osteoarthritis, Radiculopathy, cervical, Renal mass, Restrictive lung disease, Retinal abnormality - diabetes related, and Type II or unspecified type diabetes mellitus without mention of complication, not stated as uncontrolled.   Reviewed with patient     has a past surgical history that includes Lung biopsy; Cardiac catheterization; and  section. Recent placement of IVC filter 7/26/2022 due to bilateral PE with contraindication to [facility] by Dr. August Parsons History    Marital status: Single     Spouse name: Not on file    Number of children: Not on file    Years of education: Not on file    Highest education level: Not on file   Occupational History    Not on file   Tobacco Use    Smoking status: Never    Smokeless tobacco: Never   Vaping Use    Vaping Use: Never used   Substance and Sexual Activity    Alcohol use: Never    Drug use: Never    Sexual activity: Not on file   Other Topics Concern    Not on file   Social History Narrative    Not on file     Social Determinants of Health     Financial Resource Strain: Low Risk     Difficulty of Paying Living Expenses: Not hard at all   Food Insecurity: No Food Insecurity    Worried About Running Out of Food in the Last Year: Never true    Ran Out of Food in the Last Year: Never true   Transportation Needs: Not on file   Physical Activity: Not on file   Stress: Not on file   Social Connections: Not on file   Intimate Partner Violence: Not on file   Housing Stability: Not on file       Family History   Problem Relation Age of Onset    High Blood Pressure Mother     Diabetes Mother     Heart Disease Mother     Cancer Father        Allergies:  Patient has no known allergies. Home Medications:  Prior to Admission medications    Medication Sig Start Date End Date Taking? Authorizing Provider   atorvastatin (LIPITOR) 10 MG tablet Take 10 mg by mouth in the morning. Yes Historical Provider, MD   ferrous sulfate (IRON 325) 325 (65 Fe) MG tablet Take 325 mg by mouth in the morning and 325 mg before bedtime. Yes Historical Provider, MD   oxyCODONE-acetaminophen (PERCOCET) 5-325 MG per tablet Take 1 tablet by mouth every 6 hours as needed for Pain.    Yes Historical Provider, MD   pantoprazole (PROTONIX) 40 MG tablet Take 40 mg by mouth in the morning and at bedtime   Yes Historical Provider, MD   gabapentin (NEURONTIN) 300 MG capsule Take 1 capsule by mouth 3 times daily for 90 days. 11/9/21 3/1/22  Gabriela Hauser MD       REVIEW OF SYSTEMS    (2-9 systems for level 4, 10 or more for level 5)      Review of Systems   Constitutional:  Negative for chills and fever. HENT:  Negative for congestion and rhinorrhea. Eyes:  Negative for photophobia and redness. Respiratory:  Positive for shortness of breath. Negative for wheezing. Cardiovascular:  Positive for leg swelling. Negative for chest pain and palpitations. Gastrointestinal:  Positive for blood in stool. Negative for abdominal pain, nausea and vomiting. Genitourinary:  Negative for dysuria and frequency. Musculoskeletal:  Negative for back pain and neck pain. Neurological:  Negative for dizziness and headaches. PHYSICAL EXAM   (up to 7 for level 4, 8 or more for level 5)      INITIAL VITALS:   BP (!) 126/55   Pulse 79   Temp 97.5 °F (36.4 °C) (Oral)   Resp 18   Ht 5' 5\" (1.651 m)   Wt 168 lb (76.2 kg)   SpO2 99%   BMI 27.96 kg/m²     Physical Exam  Vitals and nursing note reviewed. Exam conducted with a chaperone present. Constitutional:       General: She is not in acute distress. HENT:      Head: Atraumatic. Right Ear: External ear normal.      Left Ear: External ear normal.      Nose: Nose normal.      Mouth/Throat:      Mouth: Mucous membranes are moist.      Pharynx: Oropharynx is clear. Eyes:      Conjunctiva/sclera: Conjunctivae normal.      Comments: No conjunctival pallor   Cardiovascular:      Rate and Rhythm: Normal rate and regular rhythm. Pulses: Normal pulses. Pulmonary:      Effort: Pulmonary effort is normal. No respiratory distress. Breath sounds: Normal breath sounds. No wheezing. Abdominal:      Palpations: Abdomen is soft. Tenderness: There is no abdominal tenderness. There is no rebound.    Genitourinary:     Comments: Mild bleeding in vaginal vault  Musculoskeletal:         General: Normal range of motion. Cervical back: Normal range of motion. Right lower leg: Edema (2+) present. Left lower leg: Edema (2+) present. Skin:     General: Skin is warm and dry. Capillary Refill: Capillary refill takes less than 2 seconds. Neurological:      General: No focal deficit present. Mental Status: She is alert and oriented to person, place, and time. DIFFERENTIAL  DIAGNOSIS     PLAN (LABS / IMAGING / EKG):  Orders Placed This Encounter   Procedures    XR CHEST PORTABLE    CBC with Auto Differential    Basic Metabolic Panel    Protime-INR    APTT    Troponin    Troponin    Brain Natriuretic Peptide    Path Review, Smear    Hemoglobin and Hematocrit    Basic Metabolic Panel w/ Reflex to MG    CBC    ADULT DIET;  Regular; 4 carb choices (60 gm/meal)    Diet NPO Exceptions are: Ice Chips    Vaginal exam    Vital signs per unit routine    Nursing to document all stools for color and consistency    Place intermittent pneumatic compression device    Notify physician    Up with assistance    Telemetry monitoring - continuous duration    Full Code    Consult to Gastroenterology    Consult to Hematology/Oncology    Initiate Oxygen Therapy Protocol    EKG 12 Lead    TYPE AND SCREEN    Insert peripheral IV    ADMIT TO INPATIENT       MEDICATIONS ORDERED:  Orders Placed This Encounter   Medications    atorvastatin (LIPITOR) tablet 10 mg    ferrous sulfate (IRON 325) tablet 325 mg    gabapentin (NEURONTIN) capsule 300 mg    DISCONTD: pantoprazole (PROTONIX) tablet 40 mg    sodium chloride flush 0.9 % injection 5-40 mL    sodium chloride flush 0.9 % injection 5-40 mL    0.9 % sodium chloride infusion    OR Linked Order Group     ondansetron (ZOFRAN-ODT) disintegrating tablet 4 mg     ondansetron (ZOFRAN) injection 4 mg    OR Linked Order Group     acetaminophen (TYLENOL) tablet 650 mg     acetaminophen (TYLENOL) suppository 650 mg    0.9 % sodium chloride infusion    pantoprazole (PROTONIX) 40 mg in sodium chloride (PF) 10 mL injection         DDX: Lower GI bleed, anemia    DIAGNOSTIC RESULTS / EMERGENCY DEPARTMENT COURSE / MDM   LAB RESULTS:  Results for orders placed or performed during the hospital encounter of 08/09/22   CBC with Auto Differential   Result Value Ref Range    WBC 9.2 3.5 - 11.0 k/uL    RBC 3.58 (L) 4.0 - 5.2 m/uL    Hemoglobin 7.2 (L) 12.0 - 16.0 g/dL    Hematocrit 22.0 (L) 36 - 46 %    MCV 61.5 (L) 80 - 100 fL    MCH 20.2 (L) 26 - 34 pg    MCHC 32.8 31 - 37 g/dL    RDW 26.0 (H) 11.5 - 14.9 %    Platelets 400 089 - 787 k/uL    MPV 7.0 6.0 - 12.0 fL    Seg Neutrophils 74 (H) 36 - 66 %    Lymphocytes 14 (L) 24 - 44 %    Monocytes 8 (H) 1 - 7 %    Eosinophils % 2 0 - 4 %    Basophils 2 0 - 2 %    Segs Absolute 6.81 1.3 - 9.1 k/uL    Absolute Lymph # 1.29 1.0 - 4.8 k/uL    Absolute Mono # 0.74 0.1 - 1.3 k/uL    Absolute Eos # 0.18 0.0 - 0.4 k/uL    Basophils Absolute 0.18 0.0 - 0.2 k/uL    Morphology ANISOCYTOSIS PRESENT     Morphology HYPOCHROMIA PRESENT     Morphology MICROCYTOSIS PRESENT     Morphology 1+ POLYCHROMASIA     Morphology 1+ SCHISTOCYTES    Basic Metabolic Panel   Result Value Ref Range    Glucose 206 (H) 70 - 99 mg/dL    BUN 10 8 - 23 mg/dL    Creatinine 0.69 0.50 - 0.90 mg/dL    Calcium 9.2 8.6 - 10.4 mg/dL    Sodium 133 (L) 135 - 144 mmol/L    Potassium 4.0 3.7 - 5.3 mmol/L    Chloride 100 98 - 107 mmol/L    CO2 27 20 - 31 mmol/L    Anion Gap 6 (L) 9 - 17 mmol/L    GFR Non-African American >60 >60 mL/min    GFR African American >60 >60 mL/min    GFR Comment         Protime-INR   Result Value Ref Range    Protime 14.2 11.8 - 14.6 sec    INR 1.1    APTT   Result Value Ref Range    PTT 31.6 24.0 - 36.0 sec   Troponin   Result Value Ref Range    Troponin, High Sensitivity 21 (H) 0 - 14 ng/L   Troponin   Result Value Ref Range    Troponin, High Sensitivity 19 (H) 0 - 14 ng/L   Brain Natriuretic Peptide   Result Value Ref Range notes and missed appointment in May. On exam, afebrile, normotensive, lungs clear, 2+ pitting edema to lower extremities, abdomen soft and nontender, ngozi blood to rectum. Plan to get CBC, BMP, coags, type and screen, cardiac work-up. Hemoglobin greater than 7 so plan did not give blood. Pelvic exam was performed and did show some mild vaginal bleeding. Troponin is 21 and 19 consecutively. EKG without any ST changes. Presentation similar to when she was recently admitted to [facility] for vaginal and rectal bleeding. Patient admitted due to rectal and vaginal bleeding in the setting of uterine cancer. Likely to see oncology on admit and continue to trend hemoglobin. ED Course as of 08/09/22 2301 Tue Aug 09, 2022   1850 8/8/2022 hemoglobin 7.5, similar to previous [AR]   1851 History of anemia, type 2 diabetes [AR]   1853 History of acute blood loss anemia in April 2022 requiring blood to infusion of 1 unit [AR]   5176 [facility] patient had colonoscopy in 2019, which showed external hemorrhoids as well as severe   pandiverticulosis. [AR]   2466 WBC: 9.2 [AR]   1942 Hemoglobin Quant(!): 7.2 [AR]   0029 Troponin, High Sensitivity(!): 21 [AR]   2123 Exam performed with chaperone, mild bleeding in vaginal vault likely secondary to uterine cancer. [AR]      ED Course User Index  [AR] Ashlie Mcdonald MD       No notes of [facility] Admission Criteria type on file. PROCEDURES:  None    CONSULTS:  IP CONSULT TO GI  IP CONSULT TO HEM/ONC        FINAL IMPRESSION      1. Lower GI bleed          DISPOSITION / PLAN     DISPOSITION Admitted 08/09/2022 09:05:55 PM      PATIENT REFERRED TO:  No follow-up provider specified.     DISCHARGE MEDICATIONS:  Current Discharge Medication List          Martha Moise MD  Emergency Medicine Resident    (Please note that portions of thisnote were completed with a voice recognition program.  Efforts were made to edit the dictations but occasionally words are mis-transcribed.)      Ashlie Mcdonald MD  Resident  08/09/22 5336

## 2022-08-09 NOTE — PROGRESS NOTES
Medication History completed:    New medications: atorvastatin 10mg daily, ferrous sulfate 325mg tablets twice a day, percocet tablet 5-325 give 1 tablet by mouth every 6 hours,     Medications discontinued: lovastatin 20mg, alcohol swabs, blood glucose monitor, glucose strips, lantus, syringes, lancets, latanoprost, lisinopril, preservision AREDS 2,     Changes to dosing:  Pantoprazole increased to twice a day    Stated allergies: NKDA    Other pertinent information: Confirmed medication with the [employer]. Stephan Garza  PharmD Candidate 2023, [facility]    My student completed the medication reconciliation and I reviewed the documentation.      Thank you,  Elizabeth Giles, BradD, BCPS  [phone]

## 2022-08-09 NOTE — ED PROVIDER NOTES
[address]  eMERGENCY dEPARTMENT eNCOUnter   Attending Attestation     Pt Name: Sobeida De Jesus  MRN: [id number]  [place] 1950  Date of evaluation: 8/9/22    History, EXAM, MDM:    Anna Marie Villanueva is a 67 y.o. female who presents with GI Problem (ECF staff notice bright red blood in stool this PM.  Patient's lab indicated low hemoglobin yesterday.)    BRBPR. Recent GIB treated at [facility] (records not available at this time). Not currently on anticoagulants. She does have recently diagnosed PE. IVC filter in place. She has a pelvic mass concerning for ovarian cancer with evidence of metastasis. Feeling SOB. Lungs clear. Abdomen soft, no TTP. EKG shows a sinus rhythm. HR is 99, , QRS 74, , no JOSE MANUEL, No STD,  TW flattening, the axis is normal.      Pelvic exam performed by resident and shows minimal vaginal bleeding. Hemoglobin is 7.2. Pt being placed in the hospital for monitoring of her bleeding and hemoglobin, oncology consult and GI consult. Vitals:   Vitals:    08/09/22 1844 08/09/22 1848 08/09/22 1852   BP: (!) 125/50     Pulse: 94  94   Resp: 16     Temp: 98.9 °F (37.2 °C)     TempSrc: Oral     SpO2:  99%    Weight: 168 lb (76.2 kg)     Height: 5' 5\" (1.651 m)       I performed a history and physical examination of the patient and discussed management with the resident. I reviewed the residents note and agree with the documented findings and plan of care. Any areas of disagreement are noted on the chart. I was personally present for the key portions of any procedures. I have documented in the chart those procedures where I was not present during the key portions. I have personally reviewed all images and agree with the resident's interpretation. I have reviewed the emergency nurses triage note.  I agree with the chief complaint, past medical history, past surgical history, allergies, medications, social and family history as documented unless otherwise noted below. Documentation of the HPI, Physical Exam and Medical Decision Making performed by medical students or scribes is based on my personal performance of the HPI, PE and MDM. For Phys Assistant/ Nurse Practitioner cases/documentation I have had a face to face evaluation of this patient and have completed at least one if not all key elements of the E/M (history, physical exam, and MDM). Additional findings are as noted.     Montrell Diaz MD  Attending Emergency  Physician                Montrell Diaz MD  08/10/22 Le[address] 5 John Paul Johnson MD  08/10/22 1037

## 2022-08-10 ENCOUNTER — APPOINTMENT (OUTPATIENT)
Dept: CT IMAGING | Age: 72
DRG: 530 | End: 2022-08-10
Payer: MEDICAID

## 2022-08-10 LAB
ABSOLUTE RETIC #: 0.07 M/UL (ref 0.02–0.1)
ABSOLUTE RETIC #: 0.07 M/UL (ref 0.02–0.1)
ANION GAP SERPL CALCULATED.3IONS-SCNC: 9 MMOL/L (ref 9–17)
BUN BLDV-MCNC: 7 MG/DL (ref 8–23)
CA 125: 334 U/ML
CALCIUM SERPL-MCNC: 8.9 MG/DL (ref 8.6–10.4)
CARCINOEMBRYONIC ANTIGEN: 2.1 NG/ML
CHLORIDE BLD-SCNC: 103 MMOL/L (ref 98–107)
CO2: 25 MMOL/L (ref 20–31)
CREAT SERPL-MCNC: 0.49 MG/DL (ref 0.5–0.9)
DATE, STOOL #1: NORMAL
FERRITIN: 52 NG/ML (ref 13–150)
GFR AFRICAN AMERICAN: >60 ML/MIN
GFR NON-AFRICAN AMERICAN: >60 ML/MIN
GFR SERPL CREATININE-BSD FRML MDRD: ABNORMAL ML/MIN/{1.73_M2}
GLUCOSE BLD-MCNC: 117 MG/DL (ref 65–105)
GLUCOSE BLD-MCNC: 117 MG/DL (ref 70–99)
GLUCOSE BLD-MCNC: 118 MG/DL (ref 65–105)
GLUCOSE BLD-MCNC: 152 MG/DL (ref 65–105)
GLUCOSE BLD-MCNC: 160 MG/DL (ref 65–105)
HCT VFR BLD CALC: 23.1 % (ref 36–46)
HCT VFR BLD CALC: 24.5 % (ref 36–46)
HCT VFR BLD CALC: 24.7 % (ref 36–46)
HCT VFR BLD CALC: 24.9 % (ref 36–46)
HEMOCCULT SP1 STL QL: NEGATIVE
HEMOGLOBIN: 7.2 G/DL (ref 12–16)
HEMOGLOBIN: 7.4 G/DL (ref 12–16)
HEMOGLOBIN: 7.5 G/DL (ref 12–16)
HEMOGLOBIN: 7.6 G/DL (ref 12–16)
IRON SATURATION: 10 % (ref 20–55)
IRON: 23 UG/DL (ref 37–145)
MCH RBC QN AUTO: 19 PG (ref 26–34)
MCHC RBC AUTO-ENTMCNC: 29.9 G/DL (ref 31–37)
MCV RBC AUTO: 63.6 FL (ref 80–100)
PDW BLD-RTO: 26.2 % (ref 11.5–14.9)
PLATELET # BLD: 402 K/UL (ref 150–450)
PMV BLD AUTO: 7.9 FL (ref 6–12)
POTASSIUM SERPL-SCNC: 4.5 MMOL/L (ref 3.7–5.3)
RBC # BLD: 3.92 M/UL (ref 4–5.2)
RETIC %: 1.8 % (ref 0.5–2)
RETIC %: 1.8 % (ref 0.5–2)
SODIUM BLD-SCNC: 137 MMOL/L (ref 135–144)
TIME, STOOL #1: NORMAL
TOTAL IRON BINDING CAPACITY: 224 UG/DL (ref 250–450)
UNSATURATED IRON BINDING CAPACITY: 201 UG/DL (ref 112–347)
WBC # BLD: 8.4 K/UL (ref 3.5–11)

## 2022-08-10 PROCEDURE — 82728 ASSAY OF FERRITIN: CPT

## 2022-08-10 PROCEDURE — 83550 IRON BINDING TEST: CPT

## 2022-08-10 PROCEDURE — 85045 AUTOMATED RETICULOCYTE COUNT: CPT

## 2022-08-10 PROCEDURE — 74177 CT ABD & PELVIS W/CONTRAST: CPT

## 2022-08-10 PROCEDURE — 2060000000 HC ICU INTERMEDIATE R&B

## 2022-08-10 PROCEDURE — 80048 BASIC METABOLIC PNL TOTAL CA: CPT

## 2022-08-10 PROCEDURE — 86304 IMMUNOASSAY TUMOR CA 125: CPT

## 2022-08-10 PROCEDURE — 85027 COMPLETE CBC AUTOMATED: CPT

## 2022-08-10 PROCEDURE — 6370000000 HC RX 637 (ALT 250 FOR IP): Performed by: INTERNAL MEDICINE

## 2022-08-10 PROCEDURE — 97162 PT EVAL MOD COMPLEX 30 MIN: CPT

## 2022-08-10 PROCEDURE — 82378 CARCINOEMBRYONIC ANTIGEN: CPT

## 2022-08-10 PROCEDURE — 85014 HEMATOCRIT: CPT

## 2022-08-10 PROCEDURE — 99254 IP/OBS CNSLTJ NEW/EST MOD 60: CPT | Performed by: INTERNAL MEDICINE

## 2022-08-10 PROCEDURE — 6360000002 HC RX W HCPCS: Performed by: NURSE PRACTITIONER

## 2022-08-10 PROCEDURE — 6360000004 HC RX CONTRAST MEDICATION: Performed by: INTERNAL MEDICINE

## 2022-08-10 PROCEDURE — A4216 STERILE WATER/SALINE, 10 ML: HCPCS | Performed by: NURSE PRACTITIONER

## 2022-08-10 PROCEDURE — 2580000003 HC RX 258: Performed by: NURSE PRACTITIONER

## 2022-08-10 PROCEDURE — 82607 VITAMIN B-12: CPT

## 2022-08-10 PROCEDURE — 36415 COLL VENOUS BLD VENIPUNCTURE: CPT

## 2022-08-10 PROCEDURE — 85018 HEMOGLOBIN: CPT

## 2022-08-10 PROCEDURE — 82270 OCCULT BLOOD FECES: CPT

## 2022-08-10 PROCEDURE — 97116 GAIT TRAINING THERAPY: CPT

## 2022-08-10 PROCEDURE — 2580000003 HC RX 258: Performed by: INTERNAL MEDICINE

## 2022-08-10 PROCEDURE — C9113 INJ PANTOPRAZOLE SODIUM, VIA: HCPCS | Performed by: NURSE PRACTITIONER

## 2022-08-10 PROCEDURE — 82947 ASSAY GLUCOSE BLOOD QUANT: CPT

## 2022-08-10 PROCEDURE — 82746 ASSAY OF FOLIC ACID SERUM: CPT

## 2022-08-10 PROCEDURE — 83540 ASSAY OF IRON: CPT

## 2022-08-10 PROCEDURE — 6370000000 HC RX 637 (ALT 250 FOR IP): Performed by: NURSE PRACTITIONER

## 2022-08-10 RX ORDER — 0.9 % SODIUM CHLORIDE 0.9 %
80 INTRAVENOUS SOLUTION INTRAVENOUS ONCE
Status: COMPLETED | OUTPATIENT
Start: 2022-08-10 | End: 2022-08-10

## 2022-08-10 RX ORDER — INSULIN LISPRO 100 [IU]/ML
0-4 INJECTION, SOLUTION INTRAVENOUS; SUBCUTANEOUS NIGHTLY
Status: DISCONTINUED | OUTPATIENT
Start: 2022-08-10 | End: 2022-08-16 | Stop reason: HOSPADM

## 2022-08-10 RX ORDER — INSULIN LISPRO 100 [IU]/ML
0-8 INJECTION, SOLUTION INTRAVENOUS; SUBCUTANEOUS
Status: DISCONTINUED | OUTPATIENT
Start: 2022-08-10 | End: 2022-08-16 | Stop reason: HOSPADM

## 2022-08-10 RX ORDER — SODIUM CHLORIDE 0.9 % (FLUSH) 0.9 %
10 SYRINGE (ML) INJECTION PRN
Status: DISCONTINUED | OUTPATIENT
Start: 2022-08-10 | End: 2022-08-16 | Stop reason: HOSPADM

## 2022-08-10 RX ORDER — DEXTROSE MONOHYDRATE 100 MG/ML
INJECTION, SOLUTION INTRAVENOUS CONTINUOUS PRN
Status: DISCONTINUED | OUTPATIENT
Start: 2022-08-10 | End: 2022-08-16 | Stop reason: HOSPADM

## 2022-08-10 RX ORDER — AMOXICILLIN AND CLAVULANATE POTASSIUM 875; 125 MG/1; MG/1
1 TABLET, FILM COATED ORAL EVERY 12 HOURS SCHEDULED
Status: COMPLETED | OUTPATIENT
Start: 2022-08-10 | End: 2022-08-15

## 2022-08-10 RX ADMIN — GABAPENTIN 300 MG: 300 CAPSULE ORAL at 15:27

## 2022-08-10 RX ADMIN — SODIUM CHLORIDE: 9 INJECTION, SOLUTION INTRAVENOUS at 05:49

## 2022-08-10 RX ADMIN — AMOXICILLIN AND CLAVULANATE POTASSIUM 1 TABLET: 875; 125 TABLET, FILM COATED ORAL at 21:15

## 2022-08-10 RX ADMIN — SODIUM CHLORIDE, PRESERVATIVE FREE 10 ML: 5 INJECTION INTRAVENOUS at 10:04

## 2022-08-10 RX ADMIN — IOPAMIDOL 75 ML: 755 INJECTION, SOLUTION INTRAVENOUS at 13:59

## 2022-08-10 RX ADMIN — GABAPENTIN 300 MG: 300 CAPSULE ORAL at 21:15

## 2022-08-10 RX ADMIN — FERROUS SULFATE TAB 325 MG (65 MG ELEMENTAL FE) 325 MG: 325 (65 FE) TAB at 10:04

## 2022-08-10 RX ADMIN — GABAPENTIN 300 MG: 300 CAPSULE ORAL at 10:04

## 2022-08-10 RX ADMIN — FERROUS SULFATE TAB 325 MG (65 MG ELEMENTAL FE) 325 MG: 325 (65 FE) TAB at 21:15

## 2022-08-10 RX ADMIN — SODIUM CHLORIDE: 9 INJECTION, SOLUTION INTRAVENOUS at 12:52

## 2022-08-10 RX ADMIN — ATORVASTATIN CALCIUM 10 MG: 10 TABLET, FILM COATED ORAL at 10:03

## 2022-08-10 RX ADMIN — IOHEXOL 50 ML: 240 INJECTION, SOLUTION INTRATHECAL; INTRAVASCULAR; INTRAVENOUS; ORAL at 12:50

## 2022-08-10 RX ADMIN — SODIUM CHLORIDE: 9 INJECTION, SOLUTION INTRAVENOUS at 19:28

## 2022-08-10 RX ADMIN — SODIUM CHLORIDE, PRESERVATIVE FREE 10 ML: 5 INJECTION INTRAVENOUS at 13:59

## 2022-08-10 RX ADMIN — SODIUM CHLORIDE 40 MG: 9 INJECTION INTRAMUSCULAR; INTRAVENOUS; SUBCUTANEOUS at 10:04

## 2022-08-10 RX ADMIN — SODIUM CHLORIDE 80 ML: 9 INJECTION, SOLUTION INTRAVENOUS at 14:00

## 2022-08-10 ASSESSMENT — ENCOUNTER SYMPTOMS
CHOKING: 0
SHORTNESS OF BREATH: 0
SORE THROAT: 0
SHORTNESS OF BREATH: 1
NAUSEA: 0
BLOOD IN STOOL: 1
WHEEZING: 0
VOICE CHANGE: 0
TROUBLE SWALLOWING: 0
VOMITING: 0
CONSTIPATION: 0
ANAL BLEEDING: 1
EYES NEGATIVE: 1
ABDOMINAL PAIN: 0
COUGH: 0
COLOR CHANGE: 0
BACK PAIN: 0
DIARRHEA: 0

## 2022-08-10 ASSESSMENT — PAIN SCALES - GENERAL: PAINLEVEL_OUTOF10: 3

## 2022-08-10 NOTE — PROGRESS NOTES
[facility]   OCCUPATIONAL THERAPY MISSED TREATMENT NOTE   INPATIENT   Date: 8/10/22  Patient Name: Ekta Kyle       Room:   MRN: [id number]   Account #: [de-identified]    : 1950  (77 y.o.)  Gender: female                 REASON FOR MISSED TREATMENT:  8/10/22    -   Other - Pt out of room at this time for CT. OT will attempt tomorrow.       13:57      Ashley Lisa OTR/L

## 2022-08-10 NOTE — CARE COORDINATION
Patient is coming from [facility]. SÁNCHEZ spoke with daughter and plans are to return to [facility] once medically ready. Daughter reported that there is no chemotherapy plan at this moment. SÁNCHEZ spoke with Giana at [facility] and explained that patient wants to return to [facility]. Patient will need authorization. If patient does go on chemotherapy medication facility will have to see if cost of medication is affordable. SÁNCHEZ will follow for the development of patients discharge plans.  Electronically signed by PETE Tyler on 8/10/2022 at 2:45 PM

## 2022-08-10 NOTE — PROGRESS NOTES
Physical Therapy  Facility/Department: [address] [place] [address] CARE  Physical Therapy Initial Assessment    Name: Lindsey Brewer  : 1950  MRN: [id number]  Date of Service: 8/10/2022    Discharge Recommendations:  Patient would benefit from continued therapy after discharge   PT Equipment Recommendations  Equipment Needed: No (has rollator and quad cane)      Patient Diagnosis(es): The encounter diagnosis was Lower GI bleed. Past Medical History:  has a past medical history of Anxiety and depression, Anxiety and depression, Fibroid, Hyperlipidemia, Hypertension, Leiomyomatosis, Lung nodule, Obesity, Osteoarthritis, Radiculopathy, cervical, Renal mass, Restrictive lung disease, Retinal abnormality - diabetes related, and Type II or unspecified type diabetes mellitus without mention of complication, not stated as uncontrolled. Past Surgical History:  has a past surgical history that includes Lung biopsy; Cardiac catheterization; and  section. Assessment   Body Structures, Functions, Activity Limitations Requiring Skilled Therapeutic Intervention: Decreased functional mobility ; Decreased endurance; Increased pain;Decreased strength  Assessment: continue per POC to maxmize potential  Treatment Diagnosis: impaired mobility due to weakness  Specific Instructions for Next Treatment: advance gait distance, instruct in exercise for strengthening and energy conservation and balance, progress to platform steps  Therapy Prognosis: Good  Decision Making: Medium Complexity  History: pt admitted due to GI hemorrhage  Exam: ROM, MMT, balance and mobility assessments  Clinical Presentation: independent bed mobility, min x 1 sit > stand and CGA x 1 stand > sit and gait using wheeled walker 2' forward then  3 turning steps towards the chair, FALL RISK, O2 at 3 L  Barriers to Learning: none  Requires PT Follow-Up: Yes  Activity Tolerance  Activity Tolerance: Patient tolerated treatment well     Plan   Plan  Plan: 5-7 times Shower chair with back, Curtain  Bathroom Toilet: Standard (has sink next to it)  Bathroom Equipment: Shower chair, Hand-held shower  Home Equipment: Rollator, Cane, quad  Has the patient had two or more falls in the past year or any fall with injury in the past year?: No  ADL Assistance: Independent  Homemaking Assistance: Needs assistance (son is primary for cooking, patient does her own laundry, orders in groceries)  Homemaking Responsibilities: Yes (son is primary for cooking, patient does her own laundry, orders in groceries)  Ambulation Assistance: Independent (uses rollator or quad cane- dependening on what she is doing)  Transfer Assistance: Independent  Active : No  Patient's  Info: medical cab (free cab) and [employer]  Occupation: Retired  IADL Comments: sleeps in a flat bed or sitting up in recliner  Additional Comments: son does not drive. Pt reports that she came in  from home, but chart indicates from [facility]. No family present to verify information. Vision/Hearing  Vision  Vision: Impaired  Vision Exceptions:  (recent catarct surgery left eye- currently not wearing her glasses- C/O blurry vision)  Hearing  Hearing: Exceptions to [facility]  Hearing Exceptions: Hard of hearing/hearing concerns; No hearing aid (has a \"Growth in her right ear\" which limits her hearing)    Cognition   Orientation  Overall Orientation Status: Within Functional Limits  Orientation Level: Oriented to person;Oriented to place;Oriented to situation;Oriented to time;Oriented X4     Objective   O2 Device: Nasal cannula  Comment: 3 L     Observation/Palpation  Observation: peripheral IV left forearm, O2 at 3 L  Edema: 2+ bilateral LE edema  Gross Assessment  Sensation: Impaired (C/O bilateral feet, hands and arms numbness)     AROM RLE (degrees)  RLE AROM: WFL  AROM LLE (degrees)  LLE AROM : WFL  AROM RUE (degrees)  RUE General AROM: see OT for UE assessment  AROM LUE (degrees)  LUE General AROM: see OT for UE assessment  Strength RLE  Comment: hip flexors 3+/5  otherwise grossly 4-/5  Strength LLE  Comment: hip flexors 3+/5  otherwise grossly 4-/5  Strength RUE  Comment: see OT for UE assessment  Strength LUE  Comment: see OT for UE assessment           Bed mobility  Bed Mobility Comments: pt sitting independently at the EOB when therapists entered the room. Per pt report, she independently got to the EOB.   Transfers  Sit to Stand: Minimal Assistance  Stand to sit: Contact guard assistance  Bed to Chair: Contact guard assistance (used wheeled walker)  Comment: pt leans out over the wheeled walker when standing with trunk flexed at 90 degrees before pushing self up to erect posture, verbal cues to reach back for chair handles prior to sitting  Ambulation  Surface: level tile  Device: Rolling Walker  Other Apparatus: O2 (3 L)  Assistance: Contact guard assistance  Gait Deviations: Increased VALENTIN  Distance: 2' forward then 3 turning steps towards the chair  Comments: refused further distance as pt wanted her brief changed by nursing staff, therapist offered to walk pt to the bathroom but she refused- nursing notiified  Stairs/Curb  Stairs?: No (has 1 step w/o rail to enter, flight of 10 steps w/ left rail to 2nd floor bed and bath)     Balance  Sitting - Static: Good  Sitting - Dynamic: Good  Standing - Static: Fair;+ (used wheeled walker)  Standing - Dynamic: Fair (used wheeled walker)           OutComes Score                                                  AM-PAC Score  AM-PAC Inpatient Mobility Raw Score : 18 (08/10/22 0917)  AM-PAC Inpatient T-Scale Score : 43.63 (08/10/22 0917)  Mobility Inpatient CMS 0-100% Score: 46.58 (08/10/22 0917)  Mobility Inpatient CMS G-Code Modifier : CK (08/10/22 0917)          Tinneti Score       Goals  Short Term Goals  Time Frame for Short term goals: 5-7 treatments/ week  Short term goal 1: pt to tolerate 1/2 hour of therapuetic exercise and activity keeping O2 sats above 90%  Short term goal 2: pt to demonstrate good technique for LE strengthening exercises, balance activities and energy conservation techniques  Short term goal 3: pt to demonstrate safe technique for transfers w/ CGA x 1 using wheeled walker  Short term goal 4: pt to demonstrate gait 50-80' using wheeled walker w/ CGA x 1 and O2 as ordered  Short term goal 5: pt to demonstrate good ambulatory balance using wheeled walker  Additional Goals?: Yes  Short Term Goal 6: pt to demonstrate ability to ascend/ descend 4-6\" platform step using AD w/ min x 1  Patient Goals   Patient goals : did not state a goal       Education  Patient Education  Education Given To: Patient  Education Provided: Role of Therapy;Plan of Care  Education Method: Demonstration;Verbal  Barriers to Learning: None  Education Outcome: Verbalized understanding;Continued education needed      Therapy Time   Individual Concurrent Group Co-treatment   Time In 0917         Time Out 0947         Minutes 30         Timed Code Treatment Minutes: Raudel Bauer PT

## 2022-08-10 NOTE — ACP (ADVANCE CARE PLANNING)
unlikely,     the patient would NOT desire the use of a ventilator (breathing machine). Resuscitation:  In the event the patient's heart stopped as a result of an underlying serious health condition, the patient communicates a preference for      resuscitative attempts (CPR).     Outcomes/Plan:  Teach Back Method used to verify the patient's and/or Healthcare Decision Maker's understanding of key information in the advance directive documents    Electronically signed by Eliana Bowen on 8/10/2022 at 3:43 PM

## 2022-08-10 NOTE — H&P
[address] [facility]  Danilo Ramires MD; Janett Caceres MD; Yokasta Ross MD; MD Zhanna Verde MD; Waleska Turpin MD  [facility]   [address]     HISTORY AND PHYSICAL EXAMINATION            Date:   8/10/2022  Patientname:  Anna Marie Arzola  Date of admission:  8/9/2022  6:43 PM  MRN:   [id number]  Account:  [de-identified]  YOB: 1950  PCP:    Mara Beck MD  Room:   2092/2092-01  Code Status:    Full Code      Chief Complaint:     Chief Complaint   Patient presents with    GI Problem     ECF staff notice bright red blood in stool this PM.  Patient's lab indicated low hemoglobin yesterday. History Obtained From:     patient, Quality of history:  poor historian    History of Present Illness:     Anna Marie Mistry is a 67 y.o. Non- / non  female who presents with GI Problem (ECF staff notice bright red blood in stool this PM.  Patient's lab indicated low hemoglobin yesterday.)   and is admitted to the hospital for the management of Gastrointestinal hemorrhage. Past medical history includes pelvic mass, DMT2, HTN,and HLD. Patient is able to provide very little contributing information. She was apparently sent to ED today after ECF staff noticing bright red rectal bleeding. She has a history of a similar admission to [facility] with vaginal and rectal bleeding but records from that hospitalization are not available currently. Patient has a history of a pelvic mass, adnexal mass and bilateral lung masses that are concerning for metastatic cancer. She missed several outpatient appointments with Dr. Mariangel Junior and there is no current work up or treatment plan documented. She had IVC filter placed on 7/26/22 due to history of bilateral PE and contraindication to anticoagulation. In ED, Hgb 7.2. Troponin 21 with repeat 19. EKG normal sinus without ST changes.    Symptoms are associated with shortness of breath use.    Family History:     Family History   Problem Relation Age of Onset    High Blood Pressure Mother     Diabetes Mother     Heart Disease Mother     Cancer Father        REVIEW OF SYSTEMS     Review of Systems   Constitutional:  Negative for chills, diaphoresis and fever. HENT:  Negative for congestion and sore throat. Respiratory:  Positive for shortness of breath. Negative for cough and wheezing. Cardiovascular:  Positive for leg swelling. Negative for chest pain and palpitations. Gastrointestinal:  Positive for anal bleeding. Negative for abdominal pain, constipation, diarrhea, nausea and vomiting. Genitourinary:  Positive for vaginal bleeding. Negative for dysuria, frequency and urgency. Musculoskeletal:  Negative for back pain and myalgias. Skin:  Negative for rash. Neurological:  Negative for dizziness, weakness and headaches. Psychiatric/Behavioral:  The patient is not nervous/anxious. PHYSICAL EXAM      BP (!) 147/65   Pulse 85   Temp 98.4 °F (36.9 °C) (Oral)   Resp 18   Ht 5' 5\" (1.651 m)   Wt 168 lb (76.2 kg)   SpO2 100%   BMI 27.96 kg/m²  Body mass index is 27.96 kg/m². Physical Exam  Vitals and nursing note reviewed. Constitutional:       General: She is not in acute distress. Appearance: She is well-developed. She is not diaphoretic. HENT:      Head: Normocephalic and atraumatic. Eyes:      Conjunctiva/sclera: Conjunctivae normal.      Pupils: Pupils are equal, round, and reactive to light. Neck:      Trachea: No tracheal deviation. Cardiovascular:      Rate and Rhythm: Normal rate and regular rhythm. Heart sounds: Normal heart sounds. No murmur heard. No friction rub. No gallop. Pulmonary:      Effort: Pulmonary effort is normal. No respiratory distress. Breath sounds: Normal breath sounds. No wheezing or rales. Chest:      Chest wall: No tenderness. Abdominal:      General: Bowel sounds are normal. There is no distension. Glucose 206 (H) 70 - 99 mg/dL    BUN 10 8 - 23 mg/dL    Creatinine 0.69 0.50 - 0.90 mg/dL    Calcium 9.2 8.6 - 10.4 mg/dL    Sodium 133 (L) 135 - 144 mmol/L    Potassium 4.0 3.7 - 5.3 mmol/L    Chloride 100 98 - 107 mmol/L    CO2 27 20 - 31 mmol/L    Anion Gap 6 (L) 9 - 17 mmol/L    GFR Non-African American >60 >60 mL/min    GFR African American >60 >60 mL/min    GFR Comment         Protime-INR    Collection Time: 08/09/22  7:15 PM   Result Value Ref Range    Protime 14.2 11.8 - 14.6 sec    INR 1.1    APTT    Collection Time: 08/09/22  7:15 PM   Result Value Ref Range    PTT 31.6 24.0 - 36.0 sec   Troponin    Collection Time: 08/09/22  7:15 PM   Result Value Ref Range    Troponin, High Sensitivity 21 (H) 0 - 14 ng/L   Brain Natriuretic Peptide    Collection Time: 08/09/22  7:15 PM   Result Value Ref Range    Pro-BNP 57 <300 pg/mL   TYPE AND SCREEN    Collection Time: 08/09/22  7:15 PM   Result Value Ref Range    Expiration Date 08/12/2022,2359     Arm Band Number [id number]     ABO/Rh O POSITIVE     Antibody Screen NEGATIVE     Blood Bank Comment ABORH HISTORY CONFIRMED OPOS 004    EKG 12 Lead    Collection Time: 08/09/22  7:16 PM   Result Value Ref Range    Ventricular Rate 99 BPM    Atrial Rate 99 BPM    P-R Interval 144 ms    QRS Duration 74 ms    Q-T Interval 344 ms    QTc Calculation (Bazett) 441 ms    P Axis 26 degrees    R Axis 32 degrees    T Axis 34 degrees   Troponin    Collection Time: 08/09/22  9:15 PM   Result Value Ref Range    Troponin, High Sensitivity 19 (H) 0 - 14 ng/L   Hemoglobin and Hematocrit    Collection Time: 08/10/22 12:47 AM   Result Value Ref Range    Hemoglobin 7.4 (L) 12.0 - 16.0 g/dL    Hematocrit 24.7 (L) 36 - 46 %       Imaging/Diagnostics:  XR CHEST PORTABLE    Result Date: 8/9/2022  Mild right basilar airspace disease, possibly pneumonia. Innumerable pulmonary nodules measuring up to 2 cm stable to slightly increased from 01/20/2010.   Clinical history supplied for the study performed 12 years ago was metastatic lung cancer. Correlate clinically. 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 Brandon Sumner as outpatient  -Consult oncology while inpatient   -Request records from [facility]    HTN  -Not currently taking any BP meds  -Monitor VS    Diabetes Mellitus  -hold oral hypoglycemics/metformin for now  -POCT ac and hs with sliding scale coverage       Disposition 2-3 days      Consultations:   IP CONSULT TO GI  IP CONSULT TO HEM/ONC    Patient is admitted as inpatient status because of co-morbidities listed above, severity of signs and symptoms as outlined, requirement for current medical therapies and most importantly because of direct risk to patient if care not provided in a hospital setting. Expected length of stay > 48 hours. CARLIN Nelson - FRANNY  8/10/2022  4:33 AM    Copy sent to Dr. Brian Bauer MD    Please note that this chart was generated using voice recognition Dragon dictation software. Although every effort was made to ensure the accuracy of this automated transcription, some errors in transcription may have occurred. Liberty Vanessa [address] [place] [address].    Phone [phone]       Attending Physician Statement  I have discussed the care of Anna Marie Villanueva and I have examined the patient myselft and taken ros and hpi , including pertinent history and exam findings,  with the NP.  I have reviewed the key elements of all parts of the encounter with the NP  I agree with the assessment, plan and orders as documented by the NP  26-year-old female with underlying history of hypertension, hyperlipidemia, diabetes,  Abdominal mass which she was supposed to follow-up with oncology, missed 3 appointments, I discussed at length with oncologist their office sent a letter to the patient,  I had a long discussion with patient and her daughter today, patient did admit that she adamantly miss appointments those were more important, she has been following with the doctors for her eye surgery and exam but did not follow for her abdominal mass which on the CT of the abdomen and pelvis looked like she had extensive mets, no biopsy found in the system,  CAT scan report, radiology mentioned uterine cancer, no biopsy done and pathological reports available at this time,  Admitted on this admission for anemia and possible rectal bleeding, gastroenterology on board, hemoglobin has been staying stable above 7,  No active bleeding at this time,  Chronic anemia possible due to underlying malignancy,  Morbid obesity,  Chronic abdominal pain,  Chronic right ear infection for which we have given her Augmentin at this time seems like she has not seen any ENT physician and ear canal looks extensively inflamed and eroded,  Looking at multiple comorbid condition as above patient is high risk, poor prognosis    Electronically signed by Tab Teague MD

## 2022-08-10 NOTE — PLAN OF CARE
Problem: Pain  Goal: Verbalizes/displays adequate comfort level or baseline comfort level  Outcome: Progressing  Flowsheets (Taken 8/10/2022 0305)  Verbalizes/displays adequate comfort level or baseline comfort level: Assess pain using appropriate pain scale  Note: Pt able to verbalize comfort level     Problem: Skin/Tissue Integrity  Goal: Absence of new skin breakdown  Description: 1. Monitor for areas of redness and/or skin breakdown  2. Assess vascular access sites hourly  3. Every 4-6 hours minimum:  Change oxygen saturation probe site  4. Every 4-6 hours:  If on nasal continuous positive airway pressure, respiratory therapy assess nares and determine need for appliance change or resting period.   Outcome: Progressing  Note: Pt absent of any new skin breakdown     Problem: Safety - Adult  Goal: Free from fall injury  Outcome: Progressing  Note: Pt free of falls     Problem: ABCDS Injury Assessment  Goal: Absence of physical injury  Outcome: Progressing  Note: Pt absent of any physical injury

## 2022-08-10 NOTE — FLOWSHEET NOTE
08/10/22 1148   Encounter Summary   Encounter Overview/Reason  Spiritual/Emotional Needs   Service Provided For: Patient   Referral/Consult From: Nadia   Last Encounter  08/10/22   Complexity of Encounter Moderate   Spiritual/Emotional needs   Type Spiritual Support   Assessment/Intervention/Outcome   Assessment Calm   Intervention Active listening;Discussed belief system/Amish practices/brenda;Discussed illness injury and its impact; Explored/Affirmed feelings, thoughts, concerns;Explored Coping Skills/Resources;Prayer (assurance of)/[place];Sustaining Presence/Ministry of presence   Outcome Engaged in conversation;Expressed feelings, needs, and concerns;Expressed Gratitude;Receptive

## 2022-08-10 NOTE — FLOWSHEET NOTE
Writer was asked to see pt about her HPOA decisions. Pt wants all 3 of her children involved in any decisions, noting they know her wishes. Pt expressed her reliance on \"the good Lord\" for all of her needs and was in good spirits. Please see writer's ACP note of this date for complete details regarding ACP conversation. 08/10/22 1533   Encounter Summary   Encounter Overview/Reason  Advance Care Planning   Service Provided For: Patient   Referral/Consult From: Other (comment)  (Case management)   Support System Children;Family members   Last Encounter  08/10/22   Complexity of Encounter Moderate   Begin Time 1440   End Time  1500   Total Time Calculated 20 min   Spiritual/Emotional needs   Type Spiritual Support   Advance Care Planning   Type ACP conversation   Assessment/Intervention/Outcome   Assessment Calm;Peaceful   Intervention Active listening;Discussed belief system/Mandaeism practices/brenda;Discussed illness injury and its impact; Explored/Affirmed feelings, thoughts, concerns;Explored Coping Skills/Resources;Prayer (assurance of)/[place];Sustaining Presence/Ministry of presence   Outcome Coping;Engaged in conversation;Expressed feelings, needs, and concerns;Expressed Gratitude;Receptive

## 2022-08-10 NOTE — CONSULTS
Today's Date: 8/10/2022  Patient Name: Anna Marie Villanueva  Date of admission: 2022  6:43 PM  Patient's age: 67 y.o., 1950  Admission Dx: Gastrointestinal hemorrhage [K92.2]  Lower GI bleed [K92.2]    Reason for Consult: management recommendations  Requesting Physician: All Perez MD    CHIEF COMPLAINT: GI bleed    History Obtained From:  patient    HISTORY OF PRESENT ILLNESS:      The patient is a 67 y.o.  female who is admitted to the hospital for GI bleed the staff noticed bright red blood in the stool, similar admission to the hospital at [facility] with vaginal and rectal bleed work-up was done is unavailable at this time, patient had pelvic mass adnexal mass and bilateral lung masses concerning for metastatic endometrial cancer she missed several appointment with Dr. Aydin Middleton also she does have IVC filter after she had bilateral pulmonary embolism started on anticoagulation complicated by GI bleed, she did have abdominal pain no fever or chills or weight loss or nausea or vomiting  CT abdomen on  did show extensive retroperitoneal adenopathy encasement of the aorta and multiple pelvic vein noted no bowel obstruction    Past Medical History:   has a past medical history of Anxiety and depression, Anxiety and depression, Fibroid, Hyperlipidemia, Hypertension, Leiomyomatosis, Lung nodule, Obesity, Osteoarthritis, Radiculopathy, cervical, Renal mass, Restrictive lung disease, Retinal abnormality - diabetes related, and Type II or unspecified type diabetes mellitus without mention of complication, not stated as uncontrolled. Past Surgical History:   has a past surgical history that includes Lung biopsy; Cardiac catheterization; and  section. Medications:    Reviewed in Epic     Allergies:  Patient has no known allergies. Social History:   reports that she has never smoked.  She has never used smokeless tobacco. She reports that she does not drink alcohol and does not use drugs. Family History: family history includes Cancer in her father; Diabetes in her mother; Heart Disease in her mother; High Blood Pressure in her mother. REVIEW OF SYSTEMS:    Constitutional: No fever or chills. No night sweats, no weight loss   Eyes: No eye discharge, double vision, or eye pain   HEENT: negative for sore mouth, sore throat, hoarseness and voice change   Respiratory: negative for cough , sputum, dyspnea, wheezing, hemoptysis, chest pain   Cardiovascular: negative for chest pain, dyspnea, palpitations, orthopnea, PND   Gastrointestinal: negative for nausea, vomiting, diarrhea, constipation, positive for abdominal pain, no dysphagia, hematemesis positive for hematochezia   Genitourinary: negative for frequency, dysuria, nocturia, urinary incontinence, and hematuria   Integument: negative for rash, skin lesions, bruises.    Hematologic/Lymphatic: negative for easy bruising, bleeding, lymphadenopathy, or petechiae   Endocrine: negative for heat or cold intolerance,weight changes, change in bowel habits and hair loss   Musculoskeletal: negative for myalgias, arthralgias, pain, joint swelling,and bone pain   Neurological: negative for headaches, dizziness, seizures, weakness, numbness    PHYSICAL EXAM:      BP (!) 158/89   Pulse (!) 103   Temp 97.7 °F (36.5 °C) (Oral)   Resp 18   Ht 5' 5\" (1.651 m)   Wt 175 lb 11.3 oz (79.7 kg)   SpO2 100%   BMI 29.24 kg/m²    Temp (24hrs), Av.2 °F (36.8 °C), Min:97.5 °F (36.4 °C), Max:98.9 °F (37.2 °C)    General appearance - well appearing, no in pain or distress   Mental status - alert and cooperative   Eyes - pupils equal and reactive, extraocular eye movements intact   Ears - bilateral TM's and external ear canals normal   Mouth - mucous membranes moist, pharynx normal without lesions   Neck - supple, no significant adenopathy   Lymphatics - no palpable lymphadenopathy, no hepatosplenomegaly   Chest - clear to auscultation, no wheezes, rales or rhonchi, symmetric air entry   Heart - normal rate, regular rhythm, normal S1, S2, no murmurs  Abdomen - soft, tender, nondistended, pelvic mass palpable and tender  Neurological - alert, oriented, normal speech, no focal findings or movement disorder noted   Musculoskeletal - no joint tenderness, deformity or swelling   Extremities - peripheral pulses normal, no pedal edema, no clubbing or cyanosis   Skin - normal coloration and turgor, no rashes, no suspicious skin lesions noted ,    DATA:    Labs:   CBC:   Recent Labs     08/09/22  1915 08/10/22  0047 08/10/22  0701 08/10/22  1138   WBC 9.2  --  8.4  --    HGB 7.2*   < > 7.5* 7.6*   HCT 22.0*   < > 24.9* 24.5*     --  402  --     < > = values in this interval not displayed. BMP:   Recent Labs     08/09/22  1915 08/10/22  0623   * 137   K 4.0 4.5   CO2 27 25   BUN 10 7*   CREATININE 0.69 0.49*   LABGLOM >60 >60   GLUCOSE 206* 117*     PT/INR:   Recent Labs     08/09/22 1915   PROTIME 14.2   INR 1.1       IMAGING DATA:  XR CHEST PORTABLE   Final Result   Mild right basilar airspace disease, possibly pneumonia. Innumerable pulmonary nodules measuring up to 2 cm stable to slightly   increased from 01/20/2010. Clinical history supplied for the study performed   12 years ago was metastatic lung cancer. Correlate clinically.          CT ABDOMEN PELVIS W IV CONTRAST Additional Contrast? 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 [D70.8043, Z79.4] 09/21/2012         IMPRESSION:   Pelvic mass/abdominal lymphadenopathy  Abdominal pain related to above  Anemia  GI bleed?/Vaginal bleed    RECOMMENDATIONS:  I reviewed the laboratory data, imaging studies, discussed the diagnosis and possible treatment    Patient presented with abdominal pain GI bleed/vaginal bleed she did had CT abdomen before where there is extensive retroperitoneal adenopathy and complex large cystic uterine mass with mild ascites , there was encasement of the aorta but no obstructive symptoms this is compatible with metastatic carcinoma likely endometrial, will repeated CT abdomen pelvis rule out any obstructive pathology in the abdomen will discussed with IR obtaining CT-guided biopsy,patient need to start on systemic treatment and consult with GYN oncology  CA 19-9  Anemia work-up and GYN consult    Discussed with patient and Nurse. Thank you for asking us to see this patient. [facility]                            This note is created with the assistance of a speech recognition program.  While intending to generate a document that actually reflects the content of the visit, the document can still have some errors including those of syntax and sound a like substitutions which may escape proof reading. It such instances, actual meaning can be extrapolated by contextual diversion.      Hematologist/Medical Oncologist

## 2022-08-10 NOTE — PLAN OF CARE
PRE-CONSULT ROUNDING NOTE    HPI    76-year-old female presents from [facility] after staff noticed bright red blood in her brief. Hemoglobin 7.2 on admission. Patient reports similar admission to [facility] with vaginal and rectal bleeding, records not available. Patient has history of pelvic mass, adnexal mass, bilateral lung masses concerning for metastatic cancer. Patient was to follow with Dr. Portia Alberto regarding these issues but it appears she has not. Patient is unclear if bleeding is coming from the rectum or vagina. Patient reports some chronic shortness of breath, mild bilateral lower extremity edema. Reports back pain, generalized abdominal pain. Denies any fever, chills, chest pain, cough. Denies any dysphagia, odynophagia, dyspeptic symptoms. Patient reports good appetite. Reports that she did lose a few pounds but has subsequently gained that back. No previous EGD, colonoscopy. Abdominal examination revealed large mass in the L mid abdomen. Has abdominal distention. MARCO with brown stool; will send for occult blood. Not currently on [facility] or antiplatelet therapies    Review of Systems   Constitutional:  Negative for appetite change, fatigue, fever and unexpected weight change. HENT:  Negative for mouth sores, sore throat, trouble swallowing and voice change. Eyes: Negative. Respiratory:  Negative for cough, choking, shortness of breath and wheezing. Cardiovascular:  Negative for chest pain, palpitations and leg swelling. Gastrointestinal:  Positive for blood in stool. Endocrine: Negative for polyphagia and polyuria. Genitourinary:  Positive for vaginal bleeding and vaginal discharge. Negative for difficulty urinating, frequency, hematuria, pelvic pain and urgency. Musculoskeletal:  Negative for arthralgias, back pain, gait problem and joint swelling. Skin:  Negative for color change, pallor and rash. Allergic/Immunologic: Negative for food allergies.    Neurological:  Negative for dizziness, seizures, weakness, light-headedness and headaches. Hematological:  Negative for adenopathy. Does not bruise/bleed easily. Psychiatric/Behavioral:  Negative for sleep disturbance. The patient is not nervous/anxious. Physical Exam  Vitals and nursing note reviewed. Constitutional:       Appearance: She is well-developed. HENT:      Head: Normocephalic and atraumatic. Eyes:      General: No scleral icterus. Conjunctiva/sclera: Conjunctivae normal.      Pupils: Pupils are equal, round, and reactive to light. Neck:      Thyroid: No thyromegaly. Vascular: No hepatojugular reflux or JVD. Trachea: No tracheal deviation. Cardiovascular:      Rate and Rhythm: Normal rate and regular rhythm. Heart sounds: Normal heart sounds. Pulmonary:      Effort: Pulmonary effort is normal. No respiratory distress. Breath sounds: Normal breath sounds. No wheezing or rales. Abdominal:      General: Bowel sounds are normal. There is distension. Palpations: Abdomen is soft. There is mass. There is no hepatomegaly. Tenderness: There is no abdominal tenderness. There is no rebound. Hernia: No hernia is present. Musculoskeletal:         General: No tenderness. Cervical back: Normal range of motion and neck supple. Comments: No joint swelling   Lymphadenopathy:      Cervical: No cervical adenopathy. Skin:     General: Skin is warm. Findings: No bruising, ecchymosis, erythema or rash. Neurological:      Mental Status: She is alert and oriented to person, place, and time. Cranial Nerves: No cranial nerve deficit. Psychiatric:         Thought Content: Thought content normal.       ASSESSMENT/PLAN    Anemia  ?  Rectal vs vaginal bleeding  Large pelvic mass    -Check stool for occult blood  -Tumor markers  -Recommend gyn and oncology evaluation

## 2022-08-10 NOTE — CONSULTS
for difficulty urinating, frequency, hematuria, pelvic pain and urgency. Musculoskeletal:  Negative for arthralgias, back pain, gait problem and joint swelling. Skin:  Negative for color change, pallor and rash. Allergic/Immunologic: Negative for food allergies. Neurological:  Negative for dizziness, seizures, weakness, light-headedness and headaches. Hematological:  Negative for adenopathy. Does not bruise/bleed easily. Psychiatric/Behavioral:  Negative for sleep disturbance. The patient is not nervous/anxious. Review of systems reviewed from GI APRN notes and discussed with the patient under no new changes. Physical Exam  Past Medical History:     Past Medical History:   Diagnosis Date    Anxiety and depression     Anxiety and depression     Fibroid     Hyperlipidemia     Hypertension     Leiomyomatosis     Lung nodule     Obesity     Osteoarthritis     Radiculopathy, cervical     Renal mass     Restrictive lung disease     Retinal abnormality - diabetes related     Type II or unspecified type diabetes mellitus without mention of complication, not stated as uncontrolled         Past Surgical History:     Past Surgical History:   Procedure Laterality Date    CARDIAC CATHETERIZATION       SECTION      LUNG BIOPSY          Medications Prior to Admission:       Prior to Admission medications    Medication Sig Start Date End Date Taking? Authorizing Provider   atorvastatin (LIPITOR) 10 MG tablet Take 10 mg by mouth in the morning. Yes Historical Provider, MD   ferrous sulfate (IRON 325) 325 (65 Fe) MG tablet Take 325 mg by mouth in the morning and 325 mg before bedtime. Yes Historical Provider, MD   oxyCODONE-acetaminophen (PERCOCET) 5-325 MG per tablet Take 1 tablet by mouth every 6 hours as needed for Pain.    Yes Historical Provider, MD   pantoprazole (PROTONIX) 40 MG tablet Take 40 mg by mouth in the morning and at bedtime   Yes Historical Provider, MD   gabapentin (NEURONTIN) 300 MG capsule Take 1 capsule by mouth 3 times daily for 90 days. 11/9/21 3/1/22  Kiley Dobbs MD        Allergies:       Patient has no known allergies. Social History:     Tobacco:    reports that she has never smoked. She has never used smokeless tobacco.  Alcohol:      reports no history of alcohol use. Drug Use: reports no history of drug use. Family History:     Family History   Problem Relation Age of Onset    High Blood Pressure Mother     Diabetes Mother     Heart Disease Mother     Cancer Father        Review of Systems:     Review of Systems    Code Status:  Full Code    Physical Exam:     Vitals:  BP (!) 158/89   Pulse (!) 103   Temp 97.7 °F (36.5 °C) (Oral)   Resp 18   Ht 5' 5\" (1.651 m)   Wt 175 lb 11.3 oz (79.7 kg)   SpO2 100%   BMI 29.24 kg/m²   Temp (24hrs), Av.2 °F (36.8 °C), Min:97.5 °F (36.4 °C), Max:98.9 °F (37.2 °C)      Physical Exam  Vitals and nursing note reviewed. Constitutional:       Appearance: She is well-developed. HENT:      Head: Normocephalic and atraumatic. Eyes:      General: No scleral icterus. Conjunctiva/sclera: Conjunctivae normal.      Pupils: Pupils are equal, round, and reactive to light. Neck:      Thyroid: No thyromegaly. Vascular: No hepatojugular reflux or JVD. Trachea: No tracheal deviation. Cardiovascular:      Rate and Rhythm: Normal rate and regular rhythm. Heart sounds: Normal heart sounds. Pulmonary:      Effort: Pulmonary effort is normal. No respiratory distress. Breath sounds: Normal breath sounds. No wheezing or rales. Abdominal:      General: Bowel sounds are normal. There is no distension. Palpations: Abdomen is soft. There is no hepatomegaly or mass. Tenderness: There is no abdominal tenderness. There is no rebound. Hernia: No hernia is present. Comments: Palpable mass to the left of the umbilicus. Mass is slightly tender.    Genitourinary:     Comments: Digital rectal examination revealed brown stool, liquid, sent for occult blood study. Musculoskeletal:         General: No tenderness. Cervical back: Normal range of motion and neck supple. Comments: No joint swelling   Lymphadenopathy:      Cervical: No cervical adenopathy. Skin:     General: Skin is warm. Findings: No bruising, ecchymosis, erythema or rash. Neurological:      Mental Status: She is alert and oriented to person, place, and time. Cranial Nerves: No cranial nerve deficit. Psychiatric:         Thought Content:  Thought content normal.     Data:   CBC:   Lab Results   Component Value Date/Time    WBC 8.4 08/10/2022 07:01 AM    RBC 3.92 08/10/2022 07:01 AM    RBC  07/24/2022 11:21 AM    HGB 7.6 08/10/2022 11:38 AM    HCT 24.5 08/10/2022 11:38 AM    MCV 63.6 08/10/2022 07:01 AM    MCH 19.0 08/10/2022 07:01 AM    MCHC 29.9 08/10/2022 07:01 AM    RDW 26.2 08/10/2022 07:01 AM     08/10/2022 07:01 AM     05/23/2012 11:30 AM    MPV 7.9 08/10/2022 07:01 AM     CBC with Differential:  Lab Results   Component Value Date/Time    WBC 8.4 08/10/2022 07:01 AM    RBC 3.92 08/10/2022 07:01 AM    RBC  07/24/2022 11:21 AM    HGB 7.6 08/10/2022 11:38 AM    HCT 24.5 08/10/2022 11:38 AM     08/10/2022 07:01 AM     05/23/2012 11:30 AM    MCV 63.6 08/10/2022 07:01 AM    MCH 19.0 08/10/2022 07:01 AM    MCHC 29.9 08/10/2022 07:01 AM    RDW 26.2 08/10/2022 07:01 AM    NRBC 0 07/24/2022 10:34 AM    LYMPHOPCT 14 08/09/2022 07:15 PM    LYMPHOPCT 13.0 07/24/2022 10:34 AM    MONOPCT 8 08/09/2022 07:15 PM    MONOPCT 11.5 07/24/2022 10:34 AM    BASOPCT 2 08/09/2022 07:15 PM    BASOPCT 0.4 07/24/2022 10:34 AM    MONOSABS 0.74 08/09/2022 07:15 PM    MONOSABS 1.3 07/24/2022 10:34 AM    LYMPHSABS 1.29 08/09/2022 07:15 PM    LYMPHSABS 1.5 07/24/2022 10:34 AM    EOSABS 0.18 08/09/2022 07:15 PM    EOSABS 0.1 07/24/2022 10:34 AM    BASOSABS 0.18 08/09/2022 07:15 PM    DIFFTYPE NOT REPORTED 10/23/2019 01:47 PM     Hemoglobin/Hematocrit:  Lab Results   Component Value Date/Time    HGB 7.6 08/10/2022 11:38 AM    HCT 24.5 08/10/2022 11:38 AM     CMP:    Lab Results   Component Value Date/Time     08/10/2022 06:23 AM    K 4.5 08/10/2022 06:23 AM     08/10/2022 06:23 AM    CO2 25 08/10/2022 06:23 AM    BUN 7 08/10/2022 06:23 AM    CREATININE 0.49 08/10/2022 06:23 AM    GFRAA >60 08/10/2022 06:23 AM    LABGLOM >60 08/10/2022 06:23 AM    GLUCOSE 117 08/10/2022 06:23 AM    GLUCOSE 109 07/24/2022 10:34 AM    PROT 7.4 08/04/2022 07:30 AM    PROT 7.0 07/24/2022 10:34 AM    LABALBU 3.3 08/04/2022 07:30 AM    LABALBU 3.6 07/24/2022 10:34 AM    CALCIUM 8.9 08/10/2022 06:23 AM    BILITOT 0.38 08/04/2022 07:30 AM    ALKPHOS 57 08/04/2022 07:30 AM    AST 12 08/04/2022 07:30 AM    ALT 6 08/04/2022 07:30 AM     BMP:  Lab Results   Component Value Date/Time     08/10/2022 06:23 AM    K 4.5 08/10/2022 06:23 AM     08/10/2022 06:23 AM    CO2 25 08/10/2022 06:23 AM    BUN 7 08/10/2022 06:23 AM    LABALBU 3.3 08/04/2022 07:30 AM    LABALBU 3.6 07/24/2022 10:34 AM    CREATININE 0.49 08/10/2022 06:23 AM    CALCIUM 8.9 08/10/2022 06:23 AM    GFRAA >60 08/10/2022 06:23 AM    LABGLOM >60 08/10/2022 06:23 AM    GLUCOSE 117 08/10/2022 06:23 AM    GLUCOSE 109 07/24/2022 10:34 AM     PT/INR:    Lab Results   Component Value Date/Time    PROTIME 14.2 08/09/2022 07:15 PM    PROTIME 14.8 07/24/2022 10:34 AM    INR 1.1 08/09/2022 07:15 PM     PTT:    Lab Results   Component Value Date/Time    APTT 31.6 08/09/2022 07:15 PM    APTT 29.0 07/24/2022 10:34 AM   [APTT}    Assesment:     Primary Problem  Gastrointestinal hemorrhage    Active Hospital Problems    Diagnosis Date Noted    Gastrointestinal hemorrhage [K92.2] 08/09/2022     Priority: Medium    Pelvic mass [R19.00] 12/01/2016    HTN (hypertension) [I10] 09/21/2012    Type 2 diabetes mellitus with proliferative retinopathy of left eye, with long-term current use of insulin [facility]) [O04.2570, Z79.4] 09/21/2012       Plan: This patient has documented pelvic mass, probable uterine cancer, extensive adenopathy. Also was found to have mild ascites. At present no signs of acute blood loss. Diet as tolerated. Tumor markers. Discussed with Dr. Joy Conte regarding oncology, GYN consultations. Stool for occult blood. If she has evidence of GI blood loss will initiate further GI work-up. Thank you for allowing me to participate in the care of your patient. Please feel free to contact me with anyquestions or concerns. Electronically signed by Starr Greenberg MD on 8/10/2022 at 2:37 PM     Copy sent to Dr. Lindsey Ndiaye MD    Note is dictated utilizing voice recognition software. Unfortunately this leads to occasional typographical errors. Please contact our office if you have any questions.

## 2022-08-11 PROBLEM — H66.3X1 CHRONIC SUPPURATIVE OTITIS MEDIA OF RIGHT EAR: Status: ACTIVE | Noted: 2022-01-01

## 2022-08-11 PROBLEM — R10.9 ABDOMINAL PAIN: Status: ACTIVE | Noted: 2022-01-01

## 2022-08-11 PROBLEM — D50.0 BLOOD LOSS ANEMIA: Status: ACTIVE | Noted: 2022-01-01

## 2022-08-11 PROBLEM — N93.9 VAGINAL BLEEDING: Status: ACTIVE | Noted: 2022-01-01

## 2022-08-11 LAB
ANION GAP SERPL CALCULATED.3IONS-SCNC: 4 MMOL/L (ref 9–17)
BUN BLDV-MCNC: 5 MG/DL (ref 8–23)
CALCIUM SERPL-MCNC: 8.9 MG/DL (ref 8.6–10.4)
CHLORIDE BLD-SCNC: 107 MMOL/L (ref 98–107)
CO2: 27 MMOL/L (ref 20–31)
CREAT SERPL-MCNC: 0.47 MG/DL (ref 0.5–0.9)
EKG ATRIAL RATE: 99 BPM
EKG P AXIS: 26 DEGREES
EKG P-R INTERVAL: 144 MS
EKG Q-T INTERVAL: 344 MS
EKG QRS DURATION: 74 MS
EKG QTC CALCULATION (BAZETT): 441 MS
EKG R AXIS: 32 DEGREES
EKG T AXIS: 34 DEGREES
EKG VENTRICULAR RATE: 99 BPM
FOLATE: 8 NG/ML
GFR AFRICAN AMERICAN: >60 ML/MIN
GFR NON-AFRICAN AMERICAN: >60 ML/MIN
GFR SERPL CREATININE-BSD FRML MDRD: ABNORMAL ML/MIN/{1.73_M2}
GLUCOSE BLD-MCNC: 105 MG/DL (ref 70–99)
GLUCOSE BLD-MCNC: 122 MG/DL (ref 65–105)
GLUCOSE BLD-MCNC: 123 MG/DL (ref 65–105)
GLUCOSE BLD-MCNC: 167 MG/DL (ref 65–105)
GLUCOSE BLD-MCNC: 89 MG/DL (ref 65–105)
HCT VFR BLD CALC: 21.7 % (ref 36–46)
HCT VFR BLD CALC: 22.6 % (ref 36–46)
HCT VFR BLD CALC: 25.6 % (ref 36–46)
HCT VFR BLD CALC: 26.3 % (ref 36–46)
HEMOGLOBIN: 6.8 G/DL (ref 12–16)
HEMOGLOBIN: 7.2 G/DL (ref 12–16)
HEMOGLOBIN: 7.9 G/DL (ref 12–16)
HEMOGLOBIN: 8.1 G/DL (ref 12–16)
MCH RBC QN AUTO: 19.6 PG (ref 26–34)
MCHC RBC AUTO-ENTMCNC: 31.4 G/DL (ref 31–37)
MCV RBC AUTO: 62.4 FL (ref 80–100)
PATHOLOGIST REVIEW: NORMAL
PDW BLD-RTO: 25.7 % (ref 11.5–14.9)
PLATELET # BLD: 381 K/UL (ref 150–450)
PMV BLD AUTO: 8.1 FL (ref 6–12)
POTASSIUM SERPL-SCNC: 4.2 MMOL/L (ref 3.7–5.3)
RBC # BLD: 3.48 M/UL (ref 4–5.2)
REASON FOR REJECTION: NORMAL
SODIUM BLD-SCNC: 138 MMOL/L (ref 135–144)
SURGICAL PATHOLOGY REPORT: NORMAL
VITAMIN B-12: 278 PG/ML (ref 232–1245)
WBC # BLD: 9 K/UL (ref 3.5–11)
ZZ NTE CLEAN UP: ORDERED TEST: NORMAL
ZZ NTE WITH NAME CLEAN UP: SPECIMEN SOURCE: NORMAL

## 2022-08-11 PROCEDURE — 6370000000 HC RX 637 (ALT 250 FOR IP): Performed by: INTERNAL MEDICINE

## 2022-08-11 PROCEDURE — 82947 ASSAY GLUCOSE BLOOD QUANT: CPT

## 2022-08-11 PROCEDURE — 86900 BLOOD TYPING SEROLOGIC ABO: CPT

## 2022-08-11 PROCEDURE — 85018 HEMOGLOBIN: CPT

## 2022-08-11 PROCEDURE — 97166 OT EVAL MOD COMPLEX 45 MIN: CPT

## 2022-08-11 PROCEDURE — 99233 SBSQ HOSP IP/OBS HIGH 50: CPT | Performed by: INTERNAL MEDICINE

## 2022-08-11 PROCEDURE — APPSS30 APP SPLIT SHARED TIME 16-30 MINUTES: Performed by: NURSE PRACTITIONER

## 2022-08-11 PROCEDURE — 99232 SBSQ HOSP IP/OBS MODERATE 35: CPT | Performed by: INTERNAL MEDICINE

## 2022-08-11 PROCEDURE — 80048 BASIC METABOLIC PNL TOTAL CA: CPT

## 2022-08-11 PROCEDURE — A4216 STERILE WATER/SALINE, 10 ML: HCPCS | Performed by: NURSE PRACTITIONER

## 2022-08-11 PROCEDURE — 36430 TRANSFUSION BLD/BLD COMPNT: CPT

## 2022-08-11 PROCEDURE — 85027 COMPLETE CBC AUTOMATED: CPT

## 2022-08-11 PROCEDURE — 93010 ELECTROCARDIOGRAM REPORT: CPT | Performed by: INTERNAL MEDICINE

## 2022-08-11 PROCEDURE — 2580000003 HC RX 258: Performed by: NURSE PRACTITIONER

## 2022-08-11 PROCEDURE — P9016 RBC LEUKOCYTES REDUCED: HCPCS

## 2022-08-11 PROCEDURE — 2060000000 HC ICU INTERMEDIATE R&B

## 2022-08-11 PROCEDURE — C9113 INJ PANTOPRAZOLE SODIUM, VIA: HCPCS | Performed by: NURSE PRACTITIONER

## 2022-08-11 PROCEDURE — 36415 COLL VENOUS BLD VENIPUNCTURE: CPT

## 2022-08-11 PROCEDURE — 6370000000 HC RX 637 (ALT 250 FOR IP): Performed by: NURSE PRACTITIONER

## 2022-08-11 PROCEDURE — 97530 THERAPEUTIC ACTIVITIES: CPT

## 2022-08-11 PROCEDURE — 85014 HEMATOCRIT: CPT

## 2022-08-11 PROCEDURE — 6360000002 HC RX W HCPCS: Performed by: NURSE PRACTITIONER

## 2022-08-11 RX ORDER — SODIUM CHLORIDE 9 MG/ML
INJECTION, SOLUTION INTRAVENOUS PRN
Status: DISCONTINUED | OUTPATIENT
Start: 2022-08-11 | End: 2022-08-16 | Stop reason: HOSPADM

## 2022-08-11 RX ADMIN — SODIUM CHLORIDE 40 MG: 9 INJECTION INTRAMUSCULAR; INTRAVENOUS; SUBCUTANEOUS at 08:38

## 2022-08-11 RX ADMIN — SODIUM CHLORIDE: 9 INJECTION, SOLUTION INTRAVENOUS at 01:52

## 2022-08-11 RX ADMIN — FERROUS SULFATE TAB 325 MG (65 MG ELEMENTAL FE) 325 MG: 325 (65 FE) TAB at 08:37

## 2022-08-11 RX ADMIN — AMOXICILLIN AND CLAVULANATE POTASSIUM 1 TABLET: 875; 125 TABLET, FILM COATED ORAL at 21:34

## 2022-08-11 RX ADMIN — FERROUS SULFATE TAB 325 MG (65 MG ELEMENTAL FE) 325 MG: 325 (65 FE) TAB at 21:34

## 2022-08-11 RX ADMIN — SODIUM CHLORIDE, PRESERVATIVE FREE 10 ML: 5 INJECTION INTRAVENOUS at 08:39

## 2022-08-11 RX ADMIN — GABAPENTIN 300 MG: 300 CAPSULE ORAL at 21:34

## 2022-08-11 RX ADMIN — AMOXICILLIN AND CLAVULANATE POTASSIUM 1 TABLET: 875; 125 TABLET, FILM COATED ORAL at 08:37

## 2022-08-11 RX ADMIN — GABAPENTIN 300 MG: 300 CAPSULE ORAL at 15:12

## 2022-08-11 RX ADMIN — GABAPENTIN 300 MG: 300 CAPSULE ORAL at 08:38

## 2022-08-11 RX ADMIN — SODIUM CHLORIDE: 9 INJECTION, SOLUTION INTRAVENOUS at 21:36

## 2022-08-11 RX ADMIN — ACETAMINOPHEN 650 MG: 325 TABLET, FILM COATED ORAL at 18:21

## 2022-08-11 RX ADMIN — ATORVASTATIN CALCIUM 10 MG: 10 TABLET, FILM COATED ORAL at 08:37

## 2022-08-11 ASSESSMENT — PAIN DESCRIPTION - LOCATION: LOCATION: BACK;ABDOMEN

## 2022-08-11 ASSESSMENT — PAIN SCALES - GENERAL: PAINLEVEL_OUTOF10: 10

## 2022-08-11 NOTE — DISCHARGE INSTR - COC
Continuity of Care Form    Patient Name: Adriane Gibbons   :  1950  MRN:  [id number]    Admit date:  2022  Discharge date:  2022    Code Status Order: Full Code   Advance Directives:     Admitting Physician:  Velta Bernheim, MD  PCP: Heike Peacock MD    Discharging Nurse: Jania Rose RN  [address] Unit/Room#: 2092/2092-01  Discharging Unit Phone Number: [phone]    Emergency Contact:   Extended Emergency Contact Information  Primary Emergency Contact: [facility] [place] Phone: [phone]  Relation: Child  Secondary Emergency Contact: Colleen Villanueva  Mobile Phone: [phone]  Relation: Child    Past Surgical History:  Past Surgical History:   Procedure Laterality Date    CARDIAC CATHETERIZATION       SECTION      LUNG BIOPSY         Immunization History:   Immunization History   Administered Date(s) Administered    COVID-19, MODERNA BLUE border, Primary or Immunocompromised, (age 12y+), IM, 100 mcg/0.5mL 2021, 10/02/2021    Influenza 2013    Influenza Virus Vaccine 2013, 10/07/2016    Influenza Whole 10/10/2008    Influenza, Quadv, IM, (6 mo and older Fluzone, Flulaval, Fluarix and 3 yrs and older Afluria) 10/07/2016, 10/23/2019    Influenza, Quadv, IM, PF (6 mo and older Fluzone, Flulaval, Fluarix, and 3 yrs and older Afluria) 2018    Pneumococcal Conjugate 13-valent (Noreene Hoof) 10/07/2016    Pneumococcal Polysaccharide (Yakzoikmb95) 2008, 2018    Tdap (Boostrix, Adacel) 2013    Zoster Recombinant (Shingrix) 2018, 2018       Active Problems:  Patient Active Problem List   Diagnosis Code    Fibroid D21.9    Lung nodule R91.1    Leiomyomatosis D48.1    Restrictive lung disease J98.4    Radiculopathy, cervical M54.12    Renal mass N28.89    DR (diabetic retinopathy) ([address] E11.319    HTN (hypertension) I10    Type 2 diabetes mellitus with proliferative retinopathy of left eye, with long-term current use of insulin ([facility] [id number], Z79.4    Dermatomyositis ([facility] M33.90    OA (osteoarthritis) M19.90    Hyperlipidemia E78.5    Anxiety and depression F41.9, F32. A    Adnexal mass N94.89    Pelvic mass R19.00    Thyroid nodule E04.1    Exudative age-related macular degeneration of right eye with inactive scar (HCC) H35.3213    Atrial flutter ([facility]) I48.92    Gastrointestinal hemorrhage K92.2       Isolation/Infection:   Isolation            No Isolation          Patient Infection Status       None to display            Nurse Assessment:  Last Vital Signs: /63   Pulse 97   Temp 98.4 °F (36.9 °C) (Oral)   Resp 16   Ht 5' 5\" (1.651 m)   Wt 185 lb 3 oz (84 kg)   SpO2 99%   BMI 30.82 kg/m²     Last documented pain score (0-10 scale): Pain Level: 3  Last Weight:   Wt Readings from Last 1 Encounters:   08/10/22 185 lb 3 oz (84 kg)     Mental Status:  oriented, alert, and disoriented at times    IV Access:  - None    Nursing Mobility/ADLs:  Walking   Assisted  Transfer  Assisted  Bathing  Assisted  Dressing  Assisted  Toileting  Assisted  Feeding  Independent  Med Admin  Independent  Med Delivery   whole    Wound Care Documentation and Therapy:        Elimination:  Continence: Bowel: Yes  Bladder: No  Urinary Catheter: None   Colostomy/Ileostomy/Ileal Conduit: No       Date of Last BM: 08/15/2022    Intake/Output Summary (Last 24 hours) at 8/11/2022 1243  Last data filed at 8/11/2022 0835  Gross per 24 hour   Intake 1260 ml   Output --   Net 1260 ml     I/O last 3 completed shifts:   In: 1020 [P.O.:1020]  Out: -     Safety Concerns:     None    Impairments/Disabilities:      Hearing    Nutrition Therapy:  Current Nutrition Therapy:   - Oral Diet:  General    Routes of Feeding: Oral  Liquids: No Restrictions  Daily Fluid Restriction: no  Last Modified Barium Swallow with Video (Video Swallowing Test): not done    Treatments at the Time of Hospital Discharge:   Respiratory Treatments: none  Oxygen Therapy:  is on oxygen at 2 L/min per nasal cannula. Ventilator:    - No ventilator support    Rehab Therapies: Physical Therapy and Occupational Therapy  Weight Bearing Status/Restrictions: No weight bearing restrictions  Other Medical Equipment (for information only, NOT a DME order): Other Treatments: Skilled Nursing Assessment Per Protocol, Medication Education & Monitoring. Patient's personal belongings (please select all that are sent with patient):  None    RN SIGNATURE:  Electronically signed by Marko Herring RN on 8/16/22 at 12:16 PM EDT    CASE MANAGEMENT/SOCIAL WORK SECTION    Inpatient Status Date:8/9/22    Readmission Risk Assessment Score:  Readmission Risk              Risk of Unplanned Readmission:  [id number]           Discharging to Facility/ Agency   [place] [address]  Phone [phone]  Fax [phone]  Evening fax [phone]      Dialysis Facility (if applicable)   Name:  Address:  Dialysis Schedule:  Phone:  Fax:    / signature: Electronically signed by Yazan Villalba RN on 8/11/22 at 12:43 PM EDT    PHYSICIAN SECTION    Prognosis: Fair    Condition at Discharge: Stable    Rehab Potential (if transferring to Rehab): Fair    Recommended Labs or Other Treatments After Discharge:     Physician Certification: I certify the above information and transfer of Anna Marie Villanueva  is necessary for the continuing treatment of the diagnosis listed and that she requires Raudel Hpil for greater 30 days.      Update Admission H&P: No change in H&P    PHYSICIAN SIGNATURE:  Electronically signed by Socorro Chan MD on 8/13/22 at 9:48 AM EDT

## 2022-08-11 NOTE — CARE COORDINATION
[facility] started authorization for patient to go to facility. Physical and occupational therapy will need to continue to work with patient.  Electronically signed by PETE Finley on 8/11/2022 at 1:23 PM

## 2022-08-11 NOTE — CONSULTS
..  Palliative Care Inpatient Consult    NAME:  Anna Marie Villanueva  MEDICAL RECORD NUMBER:  [id number]  AGE: 67 y.o. GENDER: female  : 1950  TODAY'S DATE:  2022    Reasons for Consultation:    Provision of information regarding PC and/or hospice philosophies  Complex, time-intensive communication and interdisciplinary psychosocial support  Clarification of goals of care and/or assistance with difficult decision-making  Guidance in regards to resources and transition(s)    Code Status:     Members of PC team contributing to this consultation are :  Jacquie Silvestre R.N     History of Present Illness     The patient is a 67 y.o. Non- / non  female who presents with GI Problem (ECF staff notice bright red blood in stool this PM.  Patient's lab indicated low hemoglobin yesterday.)    Referred to Palliative Care by   [x] Physician   [] Nursing  [] Family Request   [] Other:       She was admitted to the primary service for Gastrointestinal hemorrhage [K92.2]  Lower GI bleed [K92.2]. Her hospital course has been associated with Gastrointestinal hemorrhage.  The patient has a complicated medical history and has been hospitalized since 2022  6:43 PM.    Active Hospital Problems    Diagnosis Date Noted    Gastrointestinal hemorrhage [K92.2] 2022     Priority: Medium    Pelvic mass [R19.00] 2016    HTN (hypertension) [I10] 2012    Type 2 diabetes mellitus with proliferative retinopathy of left eye, with long-term current use of insulin ([facility] [X14.0075, Z79.4] 2012       Data        Code Status: Full Code     ADVANCED CARE PLANNING:  Patient has capacity for medical decisions: yes  [facility]: no Patient states that she wants to fill one out  Living Will: no     Personal, Social, and Family History  Marital Status: single  Living situation:nursing home and goal is to go back home with her son  Jaimie/Spiritual History:  she is a believer  Psychological Distress: Not apparent   Does patient understand diagnosis/treatment? She seems to understand that she has a mass and that she needs treatment  Does family/caregiver understand diagnosis/treatment? yes    Assessment        Palliative Performance Scale:    ___100% Full ambulation; normal activity and work; no evidence of disease; able to do own self care; normal intake; fully conscious  ___90% Full ambulation; normal activity and work; some evidence of disease; able to do own self care; normal intake; fully conscious  ___80% Full ambulation; normal activity with effort; some evidence of disease; able to do own self care; normal or reduced intake; fully conscious  ___70% Ambulation reduced; unable to perform normal job/work; significant disease; able to do own self care; normal or reduced intake; fully conscious  __X_60%  Ambulation reduced; cannot do hobbies/housework; significant disease; occasional assist; intake normal or reduced; fully conscious/some confusion  ___50%  Mainly sit/lie; can't do any work; extensive disease; considerable assist; intake normal or reduced; fully conscious/some confusion  ___40%  Mainly in bed; extensive disease; mainly assist; intake normal or reduced; fully conscious/ some confusion   ___30%  Bed bound; extensive disease; total care; intake reduced; fully conscious/some confusion  ___20%  Bed bound; extensive disease; total care; intake minimal; drowsy/coma  ___10%  Bed bound; extensive disease; total care; mouth care only; drowsy/coma  ___0       Death       Risk Assessments:    Anahy Risk Score: [unfilled]    Readmission Risk Score: 13.4        1 Year Mortality Risk Score: @1YEARMORTALITYRISK@     Plan      PLAN;   - patient updated me that the oncologist told her that her mass was cancer and that it was too big for surgery and that she would have to have chemo 1st to shrink it  - I asked her if she wanted her cancer to be treated , and she states\" yes. \"   - I ask her if she will see the cancer doctor as she missed all her appointments and she states\" yes I will follow up. \"   - I talk about her code status and she wants all treatment until they tell her that there is no hope, and she states\" let me go. \"   - the patient states that she wants to fight the cancer  - the patient agreed to complete a HCPOA/Living will and appoint her daughter Joshua Ellis \"Janie\"   - will follow for goals of care and support. Palliative Interaction:The patient is at the bedside and she states, \" I am tired. \" She is on room air and had not apparent distress. I introduce my palliative care role to her. I ask her about her conversations with the oncologist and what he had told her about he mass in her uterus. She states' yes he told me that it is cancer and that it is too big for surgery, and that I would have to do chemo first to shrink it. \" I asked if she would do the chemo and cancer treatment, and she states\" yes. \"   We talked about her missed appointments with Dr. Iam Franks and she states\" yes I will go back to him. \" We talk about her stay at [facility] and her goal is to go back and live with her son. We talk about her code status and what that means, and I explain if her heart would urgently stop or she would stop breathing that we would do CPR, shock her and put her on a ventilator. I asked her if those were her wishes, and she states\" yes I would want that.' She then states\" I know for sure if there is no hope, then let me go. \" She tells me\" for right now I want to fight.' I tell her that they are calling in a gynecological oncolgist for her female parts and that they will do a cancer work up. I then talk to her about a living will and HCPOA, and she states\" my sister Kirk Denver is helping me with the POA for my personal and bank accounts.  I tell her that we don;t handle those personal POA's but we can complete a living will and HCPOA right here for free at the hospital. I ask her if she would want to complete one, and she states\" yes.'   I ask her about her 3 children and that they can all make decisions, but who would she appoint, and she states\" Alyx Later and she goes by [employer]. \" I tell the patient that I can call her daughter and verify that she agrees to be appointed and that we can get that completed. I offer her much emotional support and she is appreciative. I call the patient's daughter Alyx Later \" Brandon Meraz" and I introduce my role to her. We talk about her Mom's mass and that they are suspicious that it is cancer, and that her Mom will have a work up, and as well her Mom states that she will have treatment. [employer] asks if her Mom has cancer other places, and I tell [employer] that they are suspicious but they will not  know for sure until they do a work up and a biopsy. [employer] and I talk about the HCPOA, and she is agreeable to be appointed. I tell [employer] that she will be her Mom's voice at any time that she cannot be her own. I encourage [employer] to talk with her Mom, as her Mom should tell her the wishes she has, and that way the decisions are made and her role to to honor her Mom's wishes. [employer] is in good understanding. I offer much emotional support and they both are appreciative. Will follow for goals of care and support. Principle Problem/Diagnosis:  Gastrointestinal hemorrhage [K92.2]  Lower GI bleed [K92.2]    Goals of care evaluation:  The patient goals of care are live longer, preserve independence/autonomy/control, and support for family/caregiver   Goals of care discussed with:    [] Patient independently    [x] Patient and Family    [] Family or Healthcare DPOA independently    [] Unable to discuss with patient, family/DPOA not present    Code Status  Full Code    Other recommendations:  Please call with any palliative questions or concerns. Palliative Care Team is available via perfect serve or via phone - [phone]. Palliative Care will continue to follow Ms. Villanueva's care as

## 2022-08-11 NOTE — ACP (ADVANCE CARE PLANNING)
(answer \"yes\" for attempt to resuscitate) or would you prefer a natural death (answer \"no\" for do not attempt to resuscitate)? \" YES       [x] Yes   [] No   Educated Patient / Pawan Garcia regarding differences between Advance Directives and portable DNR orders.     Length of ACP Conversation in minutes:      Conversation Outcomes:  [x] ACP discussion completed  [] Existing advance directive reviewed with patient; no changes to patient's previously recorded wishes  [] New Advance Directive completed  [] Portable Do Not Rescitate prepared for Provider review and signature  [] POLST/POST/MOLST/MOST prepared for Provider review and signature      Follow-up plan:    [] Schedule follow-up conversation to continue planning  [] Referred individual to Provider for additional questions/concerns   [] Advised patient/agent/surrogate to review completed ACP document and update if needed with changes in condition, patient preferences or care setting    [] This note routed to one or more involved healthcare providers

## 2022-08-11 NOTE — PROGRESS NOTES
[address]   Occupational Therapy Evaluation  Date: 22  Patient Name: Jerri Waldron       Room:   MRN: [id number]  Account: [de-identified]   : 1950  (77 y.o.) Gender: female     Discharge Recommendations:  Further Occupational Therapy is recommended upon facility discharge. Referring Practitioner: CARLIN Garcia NP  Diagnosis: Gastrointestinal hemorrhage; Lower GI bleed  Additional Pertinent Hx: Anna Marie Pereira is a 67 y.o. Non- / non  female who presents with GI Problem (ECF staff notice bright red blood in stool this PM.  Patient's lab indicated low hemoglobin yesterday) and is admitted to the hospital for the management of Gastrointestinal hemorrhage. Past medical history includes pelvic mass, DMT2, HTN,and HLD. Patient is able to provide very little contributing information. She was apparently sent to ED today after ECF staff noticing bright red rectal bleeding. She has a history of a similar admission to [facility] with vaginal and rectal bleeding but records from that hospitalization are not available currently. Patient has a history of a pelvic mass, adnexal mass and bilateral lung masses that are concerning for metastatic cancer. She missed several outpatient appointments with Dr. Chester Callaway and there is no current work up or treatment plan documented. She had IVC filter placed on 22 due to history of bilateral PE and contraindication to anticoagulation. In ED, Hgb 7.2. Troponin 21 with repeat 19. EKG normal sinus without ST changes. Symptoms are associated with shortness of breath which is chronic, bilateral leg swelling, rectal and vaginal bleeding. Denies fever, chills, chest pain, cough, abdominal pain, nausea, vomiting and diarrheaThere are no aggravating or alleviating factors. Symptoms are reported as acute, constant and moderate severity.     Treatment Diagnosis: Impaired self-care status    Past Medical History:  has a past medical history of Anxiety and depression, Anxiety and depression, Fibroid, Hyperlipidemia, Hypertension, Leiomyomatosis, Lung nodule, Obesity, Osteoarthritis, Radiculopathy, cervical, Renal mass, Restrictive lung disease, Retinal abnormality - diabetes related, and Type II or unspecified type diabetes mellitus without mention of complication, not stated as uncontrolled. Past Surgical History:   has a past surgical history that includes Lung biopsy; Cardiac catheterization; and  section.     Restrictions  Restrictions/Precautions  Restrictions/Precautions: Fall Risk;Seizure  Required Braces or Orthoses?: No  Position Activity Restriction  Other position/activity restrictions: OT/PT eval and treat, up with assist      Vitals  Vitals  Heart Rate: 97  Heart Rate Source: Monitor  BP: 136/63  BP Location: Right upper arm  BP Method: Automatic  Patient Position: Lying left side  MAP (Calculated): 87.33  Resp: 16  SpO2: 99 %  O2 Device: Nasal cannula  Comment: 2L O2     Subjective  Subjective: \"I'm too weak to get up today\"  Comments: Okay for OT eval per MARIO Dunn    Social/Functional History  Social/Functional History  Lives With: Son  Type of Home: Apartment  Home Layout: Two level, Bed/Bath upstairs (Mainly stays on the second level where her bed/bath are located)  Home Access: Stairs to enter without rails  Entrance Stairs - Number of Steps: 1 JOSE MANUEL w/o rail, flight of 10 steps w/ left rail to 2nd floor bed & bath  Bathroom Shower/Tub: Tub/Shower unit, Shower chair with back, Curtain  Bathroom Toilet: Standard  Bathroom Equipment: Shower chair, Hand-held shower  Home Equipment: Rollator, Cane, quad  Has the patient had two or more falls in the past year or any fall with injury in the past year?: No  Receives Help From: Family  ADL Assistance: Independent  Homemaking Assistance: Needs assistance (Son is primary for cooking, pt does her own laundry, has groceries delivered)  [employer] Responsibilities: Yes  Ambulation Assistance: Independent (Uses rollator or quad cane depending on what she is doing)  Transfer Assistance: Independent  Active : No  Patient's  Info: medical cab (free cab) and [employer]  Occupation: Retired  IADL Comments: sleeps in a flat bed or sitting up in recliner  Additional Comments: son does not drive. Pt reports that she came in  from home, but chart indicates from [facility]. Objective  Cognition  Cognition  Overall Cognitive Status: Exceptions  Arousal/Alertness: Delayed responses to stimuli  Following Commands: Follows one step commands with increased time  Attention Span: Attends with cues to redirect  Memory: Decreased short term memory  Safety Judgement: Decreased awareness of need for assistance  Problem Solving: Assistance required to identify errors made  Insights: Decreased awareness of deficits  Initiation: Does not require cues  Sequencing: Requires cues for some   Sensation  Overall Sensation Status: Impaired (Occasional N/T in bilat hands/feet)    Activities of Daily Living  ADL  Feeding: Setup  Grooming: Stand by assistance  UE Bathing: Stand by assistance  LE Bathing: Minimal assistance  UE Dressing: Stand by assistance  LE Dressing: Moderate assistance  Toileting: Moderate assistance  Additional Comments: ADL scores based on skilled observation and clinical reasoning unless otherwise noted. Pt limited by decreased balance, strength activity tolerance, sensation which impact the pt's ability to safely complete self-care and mobility tasks.     Gross Assessment  AROM: Within functional limits  Strength: Generally decreased, functional  Coordination: Generally decreased, functional  Tone: Normal  Sensation: Impaired (Occasional N/T in bilat hands and feet)    UE Function  LUE AROM (degrees)  LUE AROM : WFL  Left Hand AROM (degrees)  Left Hand AROM: WFL  Tone LUE  LUE Tone: Normotonic  LUE Strength  Gross LUE Strength: WFL  L Hand General: 3+/5  LUE Strength Comment: Grossly 3+/5    RUE AROM (degrees)  RUE AROM : WFL  Right Hand AROM (degrees)  Right Hand AROM: WFL  Tone RUE  RUE Tone: Normotonic  RUE Strength  Gross RUE Strength: WFL  R Hand General: 3+/5  RUE Strength Comment: Grossly 3+/5         Fine Motor Skills/Coordination  Coordination  Movements Are Fluid And Coordinated: No  Coordination and Movement Description: Decreased speed, Decreased accuracy, Right UE, Left UE       Mobility  Bed Mobility  Bed mobility  Supine to Sit: Modified independent  Sit to Supine:  (Pt seated on EOB at end of session, family member present)  Bed Mobility Comments: Moving to EOB when writer arrived, use of hand rails with HOB slightly elevated    Balance  Balance  Sitting Balance: Stand by assistance  Standing Balance  Comment: TEOFILO - pt refused due to feeling \"to weak to get up\"    Transfers  Transfers  Transfer Comments: TEOFILO - pt refused due to feeling \"too weak to get up\"    Functional Mobility  Functional Mobility Comments: TEOFILO - pt refused due to feeling \"too weak to get up\"    Assessment  Assessment  Performance deficits / Impairments: Decreased functional mobility , Decreased ADL status, Decreased strength, Decreased endurance, Decreased sensation, Decreased balance, Decreased high-level IADLs, Decreased fine motor control, Decreased coordination, Decreased vision/visual deficit  Treatment Diagnosis: Impaired self-care status  Prognosis: Good  Decision Making: Medium Complexity  Discharge Recommendations: Patient would benefit from continued therapy after discharge    Activity Tolerance  Activity Tolerance: Patient limited by pain, Patient limited by fatigue    Safety Devices  Type of Devices: Call light within reach, Left in bed, Nurse notified, Patient at risk for falls    Patient Education  Patient Education  Education Given To: Patient  Education Provided: Role of Therapy, Plan of Care, Transfer Training  Education Method: Verbal  Barriers to Learning: None  Education Outcome: Verbalized understanding      Functional Outcome Measures  AM-PAC Daily Activity Inpatient   How much help for putting on and taking off regular lower body clothing?: A Lot  How much help for Bathing?: A Little  How much help for Toileting?: A Lot  How much help for putting on and taking off regular upper body clothing?: A Little  How much help for taking care of personal grooming?: A Little  How much help for eating meals?: A Little  AM-[facility] Inpatient Daily Activity Raw Score: 16  AM-PAC Inpatient ADL T-Scale Score : 35.96  ADL Inpatient CMS 0-100% Score: 53.32  ADL Inpatient CMS G-Code Modifier : CK       Goals     Short Term Goals  Time Frame for Short term goals: By discharge  Short Term Goal 1: Pt will perform UB ADLs with Mod I and good safety  Short Term Goal 2: Pt will perform LB ADLs with SBA, good safety, and use of AE/mod techniques as needed  Short Term Goal 3: Pt will complete functional transfers/mobility during self-care tasks with SBA and Good safety while maintaining SpO2 above 90%  Short Term Goal 4: Pt will tolerate standing 5+ minutes during functional activity of choice with Good safety while maintaining Spo2 above 90%  Short Term Goal 5: Pt will actively participate in 15+ minutes of therapeutic exercise/functional activity to improve safety and independence with self-care and mobility  Short Term Goal 6: Pt will be educated on and explore use of DME/AE and modified techniques for increasing ease and independence with ADLs upon d/c    Plan  Plan  Times per Week: 5-7  Current Treatment Recommendations: Strengthening, Balance training, Functional mobility training, Endurance training, Pain management, Safety education & training, Patient/Caregiver education & training, Equipment evaluation, education, & procurement, Self-Care / ADL, Home management training, Coordination training      OT Individual Minutes  OT Individual Minutes  Time In: 1340  Time Out: 1403  Minutes: 23  Time Code Minutes Timed Code Treatment Minutes: 12 Minutes        Electronically signed by ZANA Steward on 8/11/22 at 2:24 PM EDT

## 2022-08-11 NOTE — PROGRESS NOTES
Physical Therapy        Physical Therapy Cancel Note      DATE: 2022    NAME: Anna Marei Villanueva  MRN: [id number]   : 1950      Patient not seen this date for Physical Therapy due to:    8- at 12- Pt refused treatment today due to overwhelming fatigue. Pt had a low HgB (6.8) earlier today and was given a blood transfusion.        Electronically signed by Cedric Dela Cruz, PT on 2022 at 3:19 PM

## 2022-08-11 NOTE — PROGRESS NOTES
GI Progress notes    8/11/2022   10:18 AM    Name:  Ty Salcedo  MRN:    [id number]     Acct:     [de-identified]   Room:  2092/2092-01   Day: 2     Admit Date: 8/9/2022  6:43 PM  PCP: Mary Rizvi MD    Subjective:     C/C:   Chief Complaint   Patient presents with    GI Problem     ECF staff notice bright red blood in stool this PM.  Patient's lab indicated low hemoglobin yesterday. Interval History: Status: not changed. Patient seen and examined  No acute events overnight. Hgb did drop 6.8 this am.  Consented for blood. Stool negative for occult blood x 1  CT a/p reviewed - 13.4 cm lesion uterus, innumerable pulm nodules, ill-defined lesion L lobe liver, L renal lesion, small ascites, lucent lesions to spine. ROS:  Constitutional: negative for chills, fevers and sweats  Gastrointestinal: negative for abdominal pain, constipation, diarrhea, nausea and vomiting  Neurological: negative for dizziness and headaches    Medications:      Allergies: No Known Allergies    Current Meds: 0.9 % sodium chloride infusion, PRN  glucose chewable tablet 16 g, PRN  dextrose bolus 10% 125 mL, PRN   Or  dextrose bolus 10% 250 mL, PRN  glucagon (rDNA) injection 1 mg, PRN  dextrose 10 % infusion, Continuous PRN  insulin lispro (HUMALOG) injection vial 0-8 Units, TID WC  insulin lispro (HUMALOG) injection vial 0-4 Units, Nightly  amoxicillin-clavulanate (AUGMENTIN) 875-125 MG per tablet 1 tablet, 2 times per day  sodium chloride flush 0.9 % injection 10 mL, PRN  atorvastatin (LIPITOR) tablet 10 mg, Daily  ferrous sulfate (IRON 325) tablet 325 mg, BID  gabapentin (NEURONTIN) capsule 300 mg, TID  sodium chloride flush 0.9 % injection 5-40 mL, 2 times per day  sodium chloride flush 0.9 % injection 5-40 mL, PRN  0.9 % sodium chloride infusion, PRN  ondansetron (ZOFRAN-ODT) disintegrating tablet 4 mg, Q8H PRN   Or  ondansetron (ZOFRAN) injection 4 mg, Q6H PRN  acetaminophen (TYLENOL) tablet 650 mg, Q6H PRN Or  acetaminophen (TYLENOL) suppository 650 mg, Q6H PRN  0.9 % sodium chloride infusion, Continuous  pantoprazole (PROTONIX) 40 mg in sodium chloride (PF) 10 mL injection, Daily        Data:     Code Status:  Full Code    Family History   Problem Relation Age of Onset    High Blood Pressure Mother     Diabetes Mother     Heart Disease Mother     Cancer Father        Social History     Socioeconomic History    Marital status: Single     Spouse name: Not on file    Number of children: Not on file    Years of education: Not on file    Highest education level: Not on file   Occupational History    Not on file   Tobacco Use    Smoking status: Never    Smokeless tobacco: Never   Vaping Use    Vaping Use: Never used   Substance and Sexual Activity    Alcohol use: Never    Drug use: Never    Sexual activity: Not on file   Other Topics Concern    Not on file   Social History Narrative    Not on file     Social Determinants of Health     Financial Resource Strain: Low Risk     Difficulty of Paying Living Expenses: Not hard at all   Food Insecurity: No Food Insecurity    Worried About Running Out of Food in the Last Year: Never true    Ran Out of Food in the Last Year: Never true   Transportation Needs: Not on file   Physical Activity: Not on file   Stress: Not on file   Social Connections: Not on file   Intimate Partner Violence: Not on file   Housing Stability: Not on file       Vitals:  /70   Pulse 94   Temp 97.6 °F (36.4 °C) (Oral)   Resp 18   Ht 5' 5\" (1.651 m)   Wt 185 lb 3 oz (84 kg)   SpO2 100%   BMI 30.82 kg/m²   Temp (24hrs), Av.1 °F (36.7 °C), Min:97.6 °F (36.4 °C), Max:98.7 °F (37.1 °C)    Recent Labs     08/10/22  1136 08/10/22  1603 08/10/22  2008 08/11/22  0625   POCGLU 117* 152* 160* 89       I/O (24Hr):     Intake/Output Summary (Last 24 hours) at 2022 1018  Last data filed at 2022 0835  Gross per 24 hour   Intake 1260 ml   Output --   Net 1260 ml       Labs:      CBC:   Lab Results Component Value Date/Time    WBC 9.0 08/11/2022 04:59 AM    RBC 3.48 08/11/2022 04:59 AM    RBC  07/24/2022 11:21 AM    HGB 6.8 08/11/2022 04:59 AM    HCT 21.7 08/11/2022 04:59 AM    MCV 62.4 08/11/2022 04:59 AM    MCH 19.6 08/11/2022 04:59 AM    MCHC 31.4 08/11/2022 04:59 AM    RDW 25.7 08/11/2022 04:59 AM     08/11/2022 04:59 AM     05/23/2012 11:30 AM    MPV 8.1 08/11/2022 04:59 AM     CBC with Differential:    Lab Results   Component Value Date/Time    WBC 9.0 08/11/2022 04:59 AM    RBC 3.48 08/11/2022 04:59 AM    RBC  07/24/2022 11:21 AM    HGB 6.8 08/11/2022 04:59 AM    HCT 21.7 08/11/2022 04:59 AM     08/11/2022 04:59 AM     05/23/2012 11:30 AM    MCV 62.4 08/11/2022 04:59 AM    MCH 19.6 08/11/2022 04:59 AM    MCHC 31.4 08/11/2022 04:59 AM    RDW 25.7 08/11/2022 04:59 AM    NRBC 0 07/24/2022 10:34 AM    LYMPHOPCT 14 08/09/2022 07:15 PM    LYMPHOPCT 13.0 07/24/2022 10:34 AM    MONOPCT 8 08/09/2022 07:15 PM    MONOPCT 11.5 07/24/2022 10:34 AM    BASOPCT 2 08/09/2022 07:15 PM    BASOPCT 0.4 07/24/2022 10:34 AM    MONOSABS 0.74 08/09/2022 07:15 PM    MONOSABS 1.3 07/24/2022 10:34 AM    LYMPHSABS 1.29 08/09/2022 07:15 PM    LYMPHSABS 1.5 07/24/2022 10:34 AM    EOSABS 0.18 08/09/2022 07:15 PM    EOSABS 0.1 07/24/2022 10:34 AM    BASOSABS 0.18 08/09/2022 07:15 PM    DIFFTYPE NOT REPORTED 10/23/2019 01:47 PM     Hemoglobin/Hematocrit:    Lab Results   Component Value Date/Time    HGB 6.8 08/11/2022 04:59 AM    HCT 21.7 08/11/2022 04:59 AM     CMP:    Lab Results   Component Value Date/Time     08/11/2022 04:59 AM    K 4.2 08/11/2022 04:59 AM     08/11/2022 04:59 AM    CO2 27 08/11/2022 04:59 AM    BUN 5 08/11/2022 04:59 AM    CREATININE 0.47 08/11/2022 04:59 AM    GFRAA >60 08/11/2022 04:59 AM    LABGLOM >60 08/11/2022 04:59 AM    GLUCOSE 105 08/11/2022 04:59 AM    GLUCOSE 109 07/24/2022 10:34 AM    PROT 7.4 08/04/2022 07:30 AM    PROT 7.0 07/24/2022 10:34 AM LABALBU 3.3 08/04/2022 07:30 AM    LABALBU 3.6 07/24/2022 10:34 AM    CALCIUM 8.9 08/11/2022 04:59 AM    BILITOT 0.38 08/04/2022 07:30 AM    ALKPHOS 57 08/04/2022 07:30 AM    AST 12 08/04/2022 07:30 AM    ALT 6 08/04/2022 07:30 AM     BMP:    Lab Results   Component Value Date/Time     08/11/2022 04:59 AM    K 4.2 08/11/2022 04:59 AM     08/11/2022 04:59 AM    CO2 27 08/11/2022 04:59 AM    BUN 5 08/11/2022 04:59 AM    LABALBU 3.3 08/04/2022 07:30 AM    LABALBU 3.6 07/24/2022 10:34 AM    CREATININE 0.47 08/11/2022 04:59 AM    CALCIUM 8.9 08/11/2022 04:59 AM    GFRAA >60 08/11/2022 04:59 AM    LABGLOM >60 08/11/2022 04:59 AM    GLUCOSE 105 08/11/2022 04:59 AM    GLUCOSE 109 07/24/2022 10:34 AM     PT/INR:    Lab Results   Component Value Date/Time    PROTIME 14.2 08/09/2022 07:15 PM    PROTIME 14.8 07/24/2022 10:34 AM    INR 1.1 08/09/2022 07:15 PM     PTT:    Lab Results   Component Value Date/Time    APTT 31.6 08/09/2022 07:15 PM    APTT 29.0 07/24/2022 10:34 AM   [APTT}    Physical Examination:        General appearance: alert, cooperative and no distress  Mental Status: oriented to person, place and time and normal affect  Abdomen: soft, nontender, nondistended, bowel sounds present, mass to L umbilicus to palpation  Extremities: no edema, redness or tenderness in the calves  Skin: no gross lesions, rashes, or induration    Assessment:        Primary Problem  Gastrointestinal hemorrhage     Active Hospital Problems    Diagnosis Date Noted    Gastrointestinal hemorrhage [K92.2] 08/09/2022     Priority: Medium    Pelvic mass [R19.00] 12/01/2016    HTN (hypertension) [I10] 09/21/2012    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 Rectal vs vaginal bleeding  Stool negative for occult blood  CT a/p significant for large uterine mass, extensive adenopathy, ill-defined lesion liver, renal lesion, innumerable lung nodules, small ascites  Oncology and GYN evaluation  Monitor for bleeding  Trend H&H  Transfuse for hgb < 7    Time spent reviewing the chart, seeing the patient, and discussing with the attending MD around 30 minutes. Explained to the patient and d/W Nursing Staff  Will F/U with you  Please call or Page for any issues or change in status  Thanks    Electronically signed by CARLIN Pollack NP on 8/11/2022 at 10:18 AM     GI attending physician note. Patient seen with CARLIN at about 11 AM    I independently performed an evaluation on June E Abernathy. I have reviewed the above documentation completed by the Nurse Practitioner and agree with the history, examination, and management plan. Recommendations discussed. Patient looks stable. Hemoglobin is about 6.8. To receive 1 unit of blood. Stool was tested negative for occult blood. CT scan did not reveal extensive intra abdominal mass lesions. Discussed with oncology service and he prefers biopsy. Looks like patient has liver metastasis.

## 2022-08-11 NOTE — PLAN OF CARE
Problem: Skin/Tissue Integrity  Goal: Absence of new skin breakdown  Description: 1. Monitor for areas of redness and/or skin breakdown  2. Assess vascular access sites hourly  3. Every 4-6 hours minimum:  Change oxygen saturation probe site  4. Every 4-6 hours:  If on nasal continuous positive airway pressure, respiratory therapy assess nares and determine need for appliance change or resting period.   8/11/2022 0304 by Caitlin Doan RN  Outcome: Progressing  Note: Pt absent of any new skin breakdown     Problem: Safety - Adult  Goal: Free from fall injury  8/11/2022 0304 by Caitlin Doan RN  Outcome: Progressing  Note: Pt free of falls     Problem: ABCDS Injury Assessment  Goal: Absence of physical injury  8/11/2022 0304 by Caitlin Doan RN  Outcome: Progressing  Note: Pt absent of any physical injury

## 2022-08-11 NOTE — PLAN OF CARE
Problem: Pain  Goal: Verbalizes/displays adequate comfort level or baseline comfort level  8/11/2022 1603 by Billee Severin, RN  Outcome: Progressing     Problem: Skin/Tissue Integrity  Goal: Absence of new skin breakdown  Description: 1. Monitor for areas of redness and/or skin breakdown  2. Assess vascular access sites hourly  3. Every 4-6 hours minimum:  Change oxygen saturation probe site  4. Every 4-6 hours:  If on nasal continuous positive airway pressure, respiratory therapy assess nares and determine need for appliance change or resting period. 8/11/2022 1603 by Billee Severin, RN  Outcome: Progressing     Problem: Safety - Adult  Goal: Free from fall injury  8/11/2022 1603 by Billee Severin, RN  Outcome: Progressing    The patient remained free from falls this shift, call light within reach, bed in locked and lowest position. Side rails up x2.       Problem: ABCDS Injury Assessment  Goal: Absence of physical injury  8/11/2022 1603 by Billee Severin, RN  Outcome: Progressing

## 2022-08-12 ENCOUNTER — APPOINTMENT (OUTPATIENT)
Dept: ULTRASOUND IMAGING | Age: 72
DRG: 530 | End: 2022-08-12
Payer: MEDICAID

## 2022-08-12 PROBLEM — K92.2 LOWER GI BLEED: Status: ACTIVE | Noted: 2022-08-12

## 2022-08-12 LAB
ANION GAP SERPL CALCULATED.3IONS-SCNC: 5 MMOL/L (ref 9–17)
BUN BLDV-MCNC: 4 MG/DL (ref 8–23)
CALCIUM SERPL-MCNC: 8.9 MG/DL (ref 8.6–10.4)
CHLORIDE BLD-SCNC: 107 MMOL/L (ref 98–107)
CO2: 27 MMOL/L (ref 20–31)
CREAT SERPL-MCNC: 0.52 MG/DL (ref 0.5–0.9)
GFR AFRICAN AMERICAN: >60 ML/MIN
GFR NON-AFRICAN AMERICAN: >60 ML/MIN
GFR SERPL CREATININE-BSD FRML MDRD: ABNORMAL ML/MIN/{1.73_M2}
GLUCOSE BLD-MCNC: 125 MG/DL (ref 70–99)
GLUCOSE BLD-MCNC: 133 MG/DL (ref 65–105)
GLUCOSE BLD-MCNC: 135 MG/DL (ref 65–105)
GLUCOSE BLD-MCNC: 147 MG/DL (ref 65–105)
GLUCOSE BLD-MCNC: 174 MG/DL (ref 65–105)
HCT VFR BLD CALC: 25.1 % (ref 36–46)
HCT VFR BLD CALC: 25.1 % (ref 36–46)
HCT VFR BLD CALC: 25.6 % (ref 36–46)
HCT VFR BLD CALC: 25.6 % (ref 36–46)
HEMOGLOBIN: 7.6 G/DL (ref 12–16)
HEMOGLOBIN: 7.9 G/DL (ref 12–16)
HEMOGLOBIN: 8 G/DL (ref 12–16)
HEMOGLOBIN: 8 G/DL (ref 12–16)
MCH RBC QN AUTO: 20.5 PG (ref 26–34)
MCHC RBC AUTO-ENTMCNC: 31.9 G/DL (ref 31–37)
MCV RBC AUTO: 64.3 FL (ref 80–100)
PDW BLD-RTO: 27.1 % (ref 11.5–14.9)
PLATELET # BLD: 386 K/UL (ref 150–450)
PMV BLD AUTO: 6.3 FL (ref 6–12)
POTASSIUM SERPL-SCNC: 4.1 MMOL/L (ref 3.7–5.3)
RBC # BLD: 3.91 M/UL (ref 4–5.2)
SODIUM BLD-SCNC: 139 MMOL/L (ref 135–144)
WBC # BLD: 8.4 K/UL (ref 3.5–11)

## 2022-08-12 PROCEDURE — 97110 THERAPEUTIC EXERCISES: CPT

## 2022-08-12 PROCEDURE — 2580000003 HC RX 258: Performed by: NURSE PRACTITIONER

## 2022-08-12 PROCEDURE — 99232 SBSQ HOSP IP/OBS MODERATE 35: CPT | Performed by: NURSE PRACTITIONER

## 2022-08-12 PROCEDURE — A4216 STERILE WATER/SALINE, 10 ML: HCPCS | Performed by: NURSE PRACTITIONER

## 2022-08-12 PROCEDURE — 6370000000 HC RX 637 (ALT 250 FOR IP): Performed by: INTERNAL MEDICINE

## 2022-08-12 PROCEDURE — 85027 COMPLETE CBC AUTOMATED: CPT

## 2022-08-12 PROCEDURE — 82947 ASSAY GLUCOSE BLOOD QUANT: CPT

## 2022-08-12 PROCEDURE — 80048 BASIC METABOLIC PNL TOTAL CA: CPT

## 2022-08-12 PROCEDURE — 99231 SBSQ HOSP IP/OBS SF/LOW 25: CPT | Performed by: INTERNAL MEDICINE

## 2022-08-12 PROCEDURE — 97530 THERAPEUTIC ACTIVITIES: CPT

## 2022-08-12 PROCEDURE — C9113 INJ PANTOPRAZOLE SODIUM, VIA: HCPCS | Performed by: NURSE PRACTITIONER

## 2022-08-12 PROCEDURE — 6370000000 HC RX 637 (ALT 250 FOR IP): Performed by: NURSE PRACTITIONER

## 2022-08-12 PROCEDURE — 76856 US EXAM PELVIC COMPLETE: CPT

## 2022-08-12 PROCEDURE — 36415 COLL VENOUS BLD VENIPUNCTURE: CPT

## 2022-08-12 PROCEDURE — 2060000000 HC ICU INTERMEDIATE R&B

## 2022-08-12 PROCEDURE — 85014 HEMATOCRIT: CPT

## 2022-08-12 PROCEDURE — 99232 SBSQ HOSP IP/OBS MODERATE 35: CPT | Performed by: INTERNAL MEDICINE

## 2022-08-12 PROCEDURE — 85018 HEMOGLOBIN: CPT

## 2022-08-12 PROCEDURE — 99221 1ST HOSP IP/OBS SF/LOW 40: CPT | Performed by: SPECIALIST

## 2022-08-12 PROCEDURE — 6360000002 HC RX W HCPCS: Performed by: NURSE PRACTITIONER

## 2022-08-12 RX ADMIN — ATORVASTATIN CALCIUM 10 MG: 10 TABLET, FILM COATED ORAL at 10:40

## 2022-08-12 RX ADMIN — SODIUM CHLORIDE: 9 INJECTION, SOLUTION INTRAVENOUS at 04:53

## 2022-08-12 RX ADMIN — SODIUM CHLORIDE 40 MG: 9 INJECTION INTRAMUSCULAR; INTRAVENOUS; SUBCUTANEOUS at 10:40

## 2022-08-12 RX ADMIN — AMOXICILLIN AND CLAVULANATE POTASSIUM 1 TABLET: 875; 125 TABLET, FILM COATED ORAL at 20:37

## 2022-08-12 RX ADMIN — GABAPENTIN 300 MG: 300 CAPSULE ORAL at 15:45

## 2022-08-12 RX ADMIN — GABAPENTIN 300 MG: 300 CAPSULE ORAL at 10:40

## 2022-08-12 RX ADMIN — AMOXICILLIN AND CLAVULANATE POTASSIUM 1 TABLET: 875; 125 TABLET, FILM COATED ORAL at 10:40

## 2022-08-12 RX ADMIN — GABAPENTIN 300 MG: 300 CAPSULE ORAL at 20:37

## 2022-08-12 RX ADMIN — FERROUS SULFATE TAB 325 MG (65 MG ELEMENTAL FE) 325 MG: 325 (65 FE) TAB at 20:37

## 2022-08-12 RX ADMIN — FERROUS SULFATE TAB 325 MG (65 MG ELEMENTAL FE) 325 MG: 325 (65 FE) TAB at 10:40

## 2022-08-12 RX ADMIN — ACETAMINOPHEN 650 MG: 325 TABLET, FILM COATED ORAL at 21:38

## 2022-08-12 ASSESSMENT — PAIN DESCRIPTION - LOCATION: LOCATION: BACK

## 2022-08-12 ASSESSMENT — PAIN SCALES - GENERAL: PAINLEVEL_OUTOF10: 5

## 2022-08-12 NOTE — CONSULTS
Today's Date: 2022  Patient Name: Anna Marie Villanueva  Date of admission: 2022  6:43 PM  Patient's age: 67 y.o., 1950  Admission Dx: Gastrointestinal hemorrhage [K92.2]  Lower GI bleed [K92.2]    Reason for Consult: management recommendations  Requesting Physician: Toshia Fernández MD    CHIEF COMPLAINT: GI bleed    History Obtained From:  patient    Interval history  Still have vaginal bleed intermittently  H&H down  Abdominal pain but no nausea or vomiting  CT abdomen pelvis has been reviewed      HISTORY OF PRESENT ILLNESS:      The patient is a 67 y.o.  female who is admitted to the hospital for GI bleed the staff noticed bright red blood in the stool, similar admission to the hospital at [facility] with vaginal and rectal bleed work-up was done is unavailable at this time, patient had pelvic mass adnexal mass and bilateral lung masses concerning for metastatic endometrial cancer she missed several appointment with Dr. Fleming Senior also she does have IVC filter after she had bilateral pulmonary embolism started on anticoagulation complicated by GI bleed, she did have abdominal pain no fever or chills or weight loss or nausea or vomiting  CT abdomen on  did show extensive retroperitoneal adenopathy encasement of the aorta and multiple pelvic vein noted no bowel obstruction    Past Medical History:   has a past medical history of Anxiety and depression, Anxiety and depression, Fibroid, Hyperlipidemia, Hypertension, Leiomyomatosis, Lung nodule, Obesity, Osteoarthritis, Radiculopathy, cervical, Renal mass, Restrictive lung disease, Retinal abnormality - diabetes related, and Type II or unspecified type diabetes mellitus without mention of complication, not stated as uncontrolled. Past Surgical History:   has a past surgical history that includes Lung biopsy; Cardiac catheterization; and  section.      Medications:    Reviewed in Epic     Allergies:  Patient has no known allergies. Social History:   reports that she has never smoked. She has never used smokeless tobacco. She reports that she does not drink alcohol and does not use drugs. Family History: family history includes Cancer in her father; Diabetes in her mother; Heart Disease in her mother; High Blood Pressure in her mother. REVIEW OF SYSTEMS:    Constitutional: No fever or chills. No night sweats, no weight loss   Eyes: No eye discharge, double vision, or eye pain   HEENT: negative for sore mouth, sore throat, hoarseness and voice change   Respiratory: negative for cough , sputum, dyspnea, wheezing, hemoptysis, chest pain   Cardiovascular: negative for chest pain, dyspnea, palpitations, orthopnea, PND   Gastrointestinal: negative for nausea, vomiting, diarrhea, constipation, positive for abdominal pain, no dysphagia, hematemesis positive for hematochezia   Genitourinary: negative for frequency, dysuria, nocturia, urinary incontinence, and hematuria   Integument: negative for rash, skin lesions, bruises.    Hematologic/Lymphatic: negative for easy bruising, bleeding, lymphadenopathy, or petechiae   Endocrine: negative for heat or cold intolerance,weight changes, change in bowel habits and hair loss   Musculoskeletal: negative for myalgias, arthralgias, pain, joint swelling,and bone pain   Neurological: negative for headaches, dizziness, seizures, weakness, numbness    PHYSICAL EXAM:      BP (!) 153/55   Pulse 94   Temp 97.6 °F (36.4 °C) (Oral)   Resp 18   Ht 5' 5\" (1.651 m)   Wt 185 lb 3 oz (84 kg)   SpO2 99%   BMI 30.82 kg/m²    Temp (24hrs), Av °F (36.7 °C), Min:97.6 °F (36.4 °C), Max:98.5 °F (36.9 °C)    General appearance - well appearing, no in pain or distress   Mental status - alert and cooperative   Eyes - pupils equal and reactive, extraocular eye movements intact   Ears - bilateral TM's and external ear canals normal   Mouth - mucous membranes moist, pharynx normal without lesions   Neck - supple, no significant adenopathy   Lymphatics - no palpable lymphadenopathy, no hepatosplenomegaly   Chest - clear to auscultation, no wheezes, rales or rhonchi, symmetric air entry   Heart - normal rate, regular rhythm, normal S1, S2, no murmurs  Abdomen - soft, tender, nondistended, pelvic mass palpable and tender  Neurological - alert, oriented, normal speech, no focal findings or movement disorder noted   Musculoskeletal - no joint tenderness, deformity or swelling   Extremities - peripheral pulses normal, no pedal edema, no clubbing or cyanosis   Skin - normal coloration and turgor, no rashes, no suspicious skin lesions noted ,    DATA:    Labs:   CBC:   Recent Labs     08/10/22  0701 08/10/22  1138 08/11/22  0459 08/11/22  1310 08/11/22  1845   WBC 8.4  --  9.0  --   --    HGB 7.5*   < > 6.8* 8.1* 7.9*   HCT 24.9*   < > 21.7* 26.3* 25.6*     --  381  --   --     < > = values in this interval not displayed. BMP:   Recent Labs     08/10/22  0623 08/11/22  0459    138   K 4.5 4.2   CO2 25 27   BUN 7* 5*   CREATININE 0.49* 0.47*   LABGLOM >60 >60   GLUCOSE 117* 105*       PT/INR:   Recent Labs     08/09/22  1915   PROTIME 14.2   INR 1.1         IMAGING DATA:  CT ABDOMEN PELVIS W IV CONTRAST Additional Contrast? Oral   Final Result   1. Heterogeneous 13.4 cm lesion in the uterus, concerning for known uterine   malignancy. Comparison with any prior outside examination and/or pelvic MRI   with IV contrast may be useful further evaluation. 2. Macroscopic fat containing left adnexal lesion, likely a dermoid. 3. There is retroperitoneal, iliac chain, and pelvic lymphadenopathy. 4. Hepatic steatosis. Ill-defined hypoattenuating lesion in the left hepatic   lobe is indeterminate. Hepatic protocol MRI or CT is recommended for further   evaluation. 5. Innumerable pulmonary nodules with lymphadenopathy in the lower   mediastinum, concerning for metastatic disease.    6. 1.8 cm hyperattenuating left renal lesion is indeterminate. Adrenal   protocol MRI or CT is recommended for further evaluation. 7. Wall thickening in the distal colon/rectum. This could be seen with   underdistention, but colitis would be difficult to exclude. Clinical   correlation is recommended. 8. Small ascites. 9. There are few small lucent lesions in the lumbar spine, nonspecific. MRI   could provide further information. XR CHEST PORTABLE   Final Result   Mild right basilar airspace disease, possibly pneumonia. Innumerable pulmonary nodules measuring up to 2 cm stable to slightly   increased from 01/20/2010. Clinical history supplied for the study performed   12 years ago was metastatic lung cancer. Correlate clinically.              Primary Problem  Gastrointestinal hemorrhage    Active Hospital Problems    Diagnosis Date Noted    Chronic suppurative otitis media of right ear [H66.3X1] 08/11/2022     Priority: Medium    Gastrointestinal hemorrhage [K92.2] 08/09/2022     Priority: Medium    Pelvic mass [R19.00] 12/01/2016    Essential hypertension [I10] 09/21/2012    Diabetes mellitus type 2 in obese ([facility] [address] [E11.69, E66.9] 09/21/2012         IMPRESSION:   Pelvic mass/abdominal lymphadenopathy  Abdominal pain related to above  Anemia  GI bleed?/Vaginal bleed    RECOMMENDATIONS:  I reviewed the laboratory data, imaging studies, discussed the diagnosis and possible treatment    Patient presented with abdominal pain GI bleed/vaginal bleed she did had CT abdomen before where there is extensive retroperitoneal adenopathy and complex large cystic uterine mass with mild ascites , there was encasement of the aorta but no obstructive symptoms this is compatible with metastatic carcinoma likely endometrial,   Differential CT abdomen pelvis there is no evidence of obstruction or acute finding and I reviewed the case with Dr. Blanca Turner patient can have biopsy in the office next week, in general the treatment is palliative in nature and involve systemic chemotherapy even given her comorbidity prognosis poor   334  Agree with gynecology consult for the bleed  Monitor H&H and keep hemoglobin above 7    Discussed with patient and Nurse. Thank you for asking us to see this patient. [facility]                            This note is created with the assistance of a speech recognition program.  While intending to generate a document that actually reflects the content of the visit, the document can still have some errors including those of syntax and sound a like substitutions which may escape proof reading. It such instances, actual meaning can be extrapolated by contextual diversion.      Hematologist/Medical Oncologist

## 2022-08-12 NOTE — CONSULTS
OB/GYN Consult:  reason for consult:  pelvic mass  requesting physician:  Dr Monique Bragg    Patient Name: Armando Cherry     Patient : 1950  Room/Bed:   Admission Date/Time: 2022  6:43 PM  Primary Care Physician: Karoline Dakin, MD    Consulting Provider: Dr Ghazala Orellana  Reason for Consult: Pelvic mass    CC:   Chief Complaint   Patient presents with    GI Problem     ECF staff notice bright red blood in stool this PM.  Patient's lab indicated low hemoglobin yesterday. HPI: Anna Marie Haines is a 67 y.o. female [id number] presents low Hb, c/o bleeding. No LMP recorded. Patient is postmenopausal.     REVIEW OF SYSTEMS:   A minimum of an eleven point review of systems was completed.     Constitutional: negative fever, negative chills  HEENT: negative visual disturbances, negative headaches  Respiratory: positive dyspnea, negative cough  Cardiovascular: positive chest pain,  negative palpitations  Gastrointestinal: positive abdominal pain, negative RUQ pain, negative N/V, negative diarrhea, negative constipation  Genitourinary: positive dysuria, positive vaginal discharge, positive vaginal bleeding  Dermatological: negative rash, negative wounds  Hematologic: positive bleeding/clotting disorder  Immunologic: negative recent illness, negative recent sick contact, negative allergic reactions  Lymphatic: negative lymph nodes  Musculoskeletal: negative back pain, positive myalgias, negative arthralgias  Neurological:  positive dizziness, negative weakness  Behavior/Psych: negative depression, negative anxiety  _______________________________________________________________________    GYNECOLOGICAL HISTORY:  Age of Menarche: 14  Age of Menopause: unknown     Sexually Active: multiple partners, contraception - post menopausal status   STD History: past history: unknown    Pap History: unknown  Colposcopy History: admits to    Permanent Sterilization: denies  Reversible Birth Control: reports  Hormone Replacement Exposure: denies    OBSTETRICAL HISTORY:   OB History    Para Term  AB Living   5 4 4 0 1 4   SAB IAB Ectopic Molar Multiple Live Births   1 0 0 0 0 4      # Outcome Date GA Lbr Gama/2nd Weight Sex Delivery Anes PTL Lv   5 SAB            4 Term      CS-Unspec   AB   3 Term      Vag-Spont   AB   2 Term      Vag-Spont   AB   1 Term      Vag-Spont   AB       PAST MEDICAL HISTORY:   has a past medical history of Anxiety and depression, Anxiety and depression, Fibroid, Hyperlipidemia, Hypertension, Leiomyomatosis, Lung nodule, Obesity, Osteoarthritis, Radiculopathy, cervical, Renal mass, Restrictive lung disease, Retinal abnormality - diabetes related, and Type II or unspecified type diabetes mellitus without mention of complication, not stated as uncontrolled. PAST SURGICAL HISTORY:   has a past surgical history that includes Lung biopsy; Cardiac catheterization; and  section.     ALLERGIES:  Allergies as of 2022    (No Known Allergies)       MEDICATIONS:  Current Facility-Administered Medications   Medication Dose Route Frequency Provider Last Rate Last Admin    0.9 % sodium chloride infusion   IntraVENous PRN Toribio Wright APRN - NP        glucose chewable tablet 16 g  4 tablet Oral PRN Danna Ramírez, APRN - NP        dextrose bolus 10% 125 mL  125 mL IntraVENous PRN CARLIN Sanderson - NP        Or    dextrose bolus 10% 250 mL  250 mL IntraVENous PRN Danna Ramírez, APRN - NP        glucagon (rDNA) injection 1 mg  1 mg IntraMUSCular PRN Danna Ramírez, APRN - NP        dextrose 10 % infusion   IntraVENous Continuous PRN Danna Ramírez APRN - NP        insulin lispro (HUMALOG) injection vial 0-8 Units  0-8 Units SubCUTAneous TID  Danna Ramírez APRN - NP        insulin lispro (HUMALOG) injection vial 0-4 Units  0-4 Units SubCUTAneous Nightly Wild Grace APRN - NP        amoxicillin-clavulanate (AUGMENTIN) 875-125 MG per tablet 1 tablet  1 tablet Oral 2 times per day Renella Boeck, MD   1 tablet at 08/11/22 2134    sodium chloride flush 0.9 % injection 10 mL  10 mL IntraVENous PRN Renella Boeck, MD   10 mL at 08/10/22 1359    atorvastatin (LIPITOR) tablet 10 mg  10 mg Oral Daily Marianne Samaniego APRN - NP   10 mg at 08/11/22 0837    ferrous sulfate (IRON 325) tablet 325 mg  325 mg Oral BID Marianne Samaniego APRN - NP   325 mg at 08/11/22 2134    gabapentin (NEURONTIN) capsule 300 mg  300 mg Oral TID Marianne Samaniego APRN - NP   300 mg at 08/11/22 2134    sodium chloride flush 0.9 % injection 5-40 mL  5-40 mL IntraVENous 2 times per day Marianne Samaniego APRN - NP   10 mL at 08/11/22 0839    sodium chloride flush 0.9 % injection 5-40 mL  5-40 mL IntraVENous PRN Danna Ramírez APRN - NP        0.9 % sodium chloride infusion   IntraVENous PRN Marianne Samaniego APRN - NP        ondansetron (ZOFRAN-ODT) disintegrating tablet 4 mg  4 mg Oral Q8H PRN Marianne Samaniego APRN - NP        Or    ondansetron (ZOFRAN) injection 4 mg  4 mg IntraVENous Q6H PRN Marianne Samaniego APRN - NP        acetaminophen (TYLENOL) tablet 650 mg  650 mg Oral Q6H PRN Marianne Samaniego APRN - NP   650 mg at 08/11/22 1821    Or    acetaminophen (TYLENOL) suppository 650 mg  650 mg Rectal Q6H PRN Danna Ramírez APRN - NP        0.9 % sodium chloride infusion   IntraVENous Continuous CARLIN Moss -  mL/hr at 08/12/22 0453 New Bag at 08/12/22 0453    pantoprazole (PROTONIX) 40 mg in sodium chloride (PF) 10 mL injection  40 mg IntraVENous Daily CARLIN Moss - NP   40 mg at 08/11/22 4300       FAMILY HISTORY:  Family History of Breast, Ovarian, Colon or Uterine Cancer: No   family history includes Cancer in her father; Diabetes in her mother; Heart Disease in her mother; High Blood Pressure in her mother. SOCIAL HISTORY:   reports that she has never smoked.  She has never used smokeless tobacco. She reports that she does not drink alcohol and does not use drugs. HEALTH MAINTENANCE:  Date of Last Mammogram: was normal  Date of Last Colonoscopy: unknown  Date of Last Bone Density: unknown,   ________________________________________________________________________                                    Inidgo Lee:  See nurses notes                                               INPUT/OUTPUT:  I/O this shift:  In: 1920 [P.O.:120; I.V.:1800]  Out: -   In: 0938 [P.O.:1020; I.V.:1800]  Out: -                                                                                                                                PHYSICAL EXAM:     General Appearance: Appears healthy. Alert; in no acute distress. Pleasant. Skin: Skin color, texture, turgor normal. No rashes or lesions. Lymphatic: No abnormally enlarged lymph nodes. Neck and EENT: normal atraumatic, no neck masses, normal thyroid, no jvd  Respiratory: \  Cardiovascular: regular rate and rhythm  Abdomen: , no abdominal scars  Pelvic Exam:   External genitalia: \  Urinary system:   Vaginal: deferred  Cervix: deferred  Adnexa: normal adnexa in size, nontender and no masses, mass present both side, size 13 cm  Uterus: irregular enlargement  Rectal Exam: deferred  Musculoskeletal: no gross abnormalities  Extremities: non-tender BLE and non-edematous  Psych:  oriented to time, place and person     OMM EXAM:  The patient did complain of a Chief complaint requiring OMM.   Chief Complaint:bleeding    Structural Exam: Agreeable    LAB RESULTS:  See labs  O POSITIVE       [unfilled]    No results found for: PREGTESTUR, PREGSERUM, HCG, HCGQUANT    Lab Results   Component Value Date    WBC 9.0 08/11/2022    HGB 7.6 (L) 08/12/2022    HCT 25.1 (L) 08/12/2022    MCV 62.4 (L) 08/11/2022     08/11/2022       Lab Results   Component Value Date/Time     08/11/2022 04:59 AM    K 4.2 08/11/2022 04:59 AM     08/11/2022 04:59 AM    CO2 27 08/11/2022 04:59 AM    BUN 5 08/11/2022 04:59 AM CREATININE 0.47 2022 04:59 AM    GLUCOSE 105 2022 04:59 AM    GLUCOSE 109 2022 10:34 AM    CALCIUM 8.9 2022 04:59 AM        DIAGNOSTICS:  Pelvic Us  CTA CHEST W WO CONTRAST    Result Date: 2022  Pulmonary Embolism [facility] Department of Radiology [address], [place] [phone]    ========================================================================== Patient Name: Olivia Gonzalez : 1950 Sex: F Age: Race: Black MRN: [id number] Pt. Location: [facility] Patient Status: E Visit #: [id number] Ordered Date: 2022 11:30:00 AM Completed Date: 2022 11:49 AM Requesting Provider: Yomaira Oliver Attending Provider: Yomaira Oliver Report Copy To: Signs ^ Symptoms: Chest Pain History: See Comments Comments: Pulmonary Embolism Exam: CTA CHEST Accession #: [id number] ========================================================================== CTA Chest  History:  Rectal bleeding, chest pain. Evaluate for pulmonary embolism. Metastatic uterine cancer. Technique: Patient received intravenous contrast and 2.5 mm axial thick images through the chest were obtained followed by 3-D MIP reconstructed images. 3-D imaging was performed under concurrent physician supervision. Contrast:  100 mL Omnipaque 350. Comparison: 2022  Findings: There are now acute segmental pulmonary emboli in left upper lobe, left lower lobe, right upper lobe, right middle lobe, right lower lobe branches. No significant right heart strain. Extensive metastatic disease within numerable pulmonary nodules again noted. No pneumothorax. No pleural or pericardial effusions. No developing osseous destructive lesions. No thoracic aortic aneurysm. Impression: Acute bilateral segmental pulmonary emboli without right heart strain. Known extensive metastatic disease with innumerable lung nodules. Results were discussed immediately with emergency room resident.   All CT scans at this facility use dose modulation, iterative reconstruction, and/or weight based dosing when appropriate to reduce radiation dose to as low as reasonably achievable. Electronically signed: Dedra Donohue. Transcribed by: Herlinda, User Resident: Electronically Signed by: Delano Rojas @ 07/24/2022 12:21 PM    CT ABDOMEN PELVIS W IV CONTRAST Additional Contrast? Oral    Result Date: 8/10/2022  EXAMINATION: CT OF THE ABDOMEN AND PELVIS WITH CONTRAST 8/10/2022 1:54 pm TECHNIQUE: CT of the abdomen and pelvis was performed with the administration of intravenous contrast. Multiplanar reformatted images are provided for review. Automated exposure control, iterative reconstruction, and/or weight based adjustment of the mA/kV was utilized to reduce the radiation dose to as low as reasonably achievable. COMPARISON: None. HISTORY: ORDERING SYSTEM PROVIDED HISTORY: uterine cancer TECHNOLOGIST PROVIDED HISTORY: uterine cancer Reason for Exam: uterine cancer Additional signs and symptoms: Pt had no complaints at the time of CT scan FINDINGS: Lower Chest: There are innumerable bilateral pulmonary nodules, the largest of which measures 2.6 x 1.6 cm in the left lower lobe. There is an enlarged periaortic lymph node at the level of the diaphragm. Organs: The liver is diffusely hypoattenuating. There is some ill-defined hypoattenuation in the left hepatic lobe, indeterminate. No acute abnormality in the gallbladder, spleen, pancreas, or adrenal glands. No nephrolithiasis or hydronephrosis. 1.8 cm hyperattenuating left renal lesion. GI/Bowel: Stomach is partially distended. The small bowel is nondilated. The appendix is nondilated. The colon is nondilated. There is wall thickening in the distal colon/rectum. Pelvis: Bladder is partially distended without vesicular stone. The uterus is markedly enlarged with a 13.4 x 12 cm heterogeneous lesion. There is also a cystic lesion in the uterus/left adnexa. There is a macroscopic fat containing left adnexal lesion. Peritoneum/Retroperitoneum: Small ascites or pneumoperitoneum. Abdominal aorta is normal in caliber. IVC filter is noted. There is retroperitoneal lymphadenopathy. There also enlarged pelvic and iliac chain lymph nodes. Bones/Soft Tissues: Multilevel degenerative changes in the lumbar spine. There are few scattered lucent lesions, indeterminate. 1. Heterogeneous 13.4 cm lesion in the uterus, concerning for known uterine malignancy. Comparison with any prior outside examination and/or pelvic MRI with IV contrast may be useful further evaluation. 2. Macroscopic fat containing left adnexal lesion, likely a dermoid. 3. There is retroperitoneal, iliac chain, and pelvic lymphadenopathy. 4. Hepatic steatosis. Ill-defined hypoattenuating lesion in the left hepatic lobe is indeterminate. Hepatic protocol MRI or CT is recommended for further evaluation. 5. Innumerable pulmonary nodules with lymphadenopathy in the lower mediastinum, concerning for metastatic disease. 6. 1.8 cm hyperattenuating left renal lesion is indeterminate. Adrenal protocol MRI or CT is recommended for further evaluation. 7. Wall thickening in the distal colon/rectum. This could be seen with underdistention, but colitis would be difficult to exclude. Clinical correlation is recommended. 8. Small ascites. 9. There are few small lucent lesions in the lumbar spine, nonspecific. MRI could provide further information. US LIVER    Result Date: 2022  Liver Disease [facility] Department of Radiology [address], [place] [phone]    ========================================================================== Patient Name: Marylu Ridley : 1950 Sex: F Age: Race: Black MRN: [id number] Pt.  Location: [id number] Patient Status: I Visit #: [id number] Ordered Date: 2022 7:10:00 PM Completed Date: 2022 09:57 AM Requesting Provider: Alex Tavares Attending Provider: Tosha Diehl Report Copy To: Signs ^ Symptoms: Abnormal Labs History: See Comments Comments: Liver Disease Exam: US LIVER Accession #: [id number] ========================================================================== US LIVER  7/27/2022 9:57 AM  CLINICAL INDICATIONS: Abnormal Labs  TECHNOLOGIST COMMENTS: abnormal labs  QUESTION FOR THE RADIOLOGIST: Liver Disease. History of uterine cancer. COMPARISON: CT abdomen pelvis 6/24/2022                   FINDINGS:  Evaluation is compromised due to shadowing from overlying bowel gas. There is small or free fluid adjacent to the liver. No focal hepatic abnormality identified. Hepatopedal flow in the main portal vein. Dependent echogenic foci in the gallbladder without definite shadowing likely sludge as well as additional internal echoes throughout the gallbladder. No gallbladder wall thickening. Technologist reports a negative sonographic Contreras's sign. Common duct is not dilated measuring 4 mm in diameter. IMPRESSION:   1. Small ascites in the right upper quadrant. 2. Gallbladder sludge. No sonographic evidence for acute cholecystitis. 3. Liver demonstrates no focal abnormality. Electronically signed: Bianca Oliver. Transcribed by: Kim, User Resident: Electronically Signed by: Senia Carter @ 07/27/2022 12:12 PM    XR CHEST PORTABLE    Result Date: 8/9/2022  EXAMINATION: ONE XRAY VIEW OF THE CHEST 8/9/2022 7:11 pm COMPARISON: 01/20/2010 and 04/23/2008 HISTORY: ORDERING SYSTEM PROVIDED HISTORY: shortness of breath TECHNOLOGIST PROVIDED HISTORY: shortness of breath Reason for Exam: PT CO chronic SOB that has gotten worse over the last several days. FINDINGS: There are innumerable pulmonary nodules measuring up to 2 cm stable to mildly increased from 01/20/2010.   There is suspicion of superimposed mild acute right basilar airspace disease with ill definition of the lateral right hemidiaphragm and right costophrenic angle. No pneumothorax or significant pleural effusion. Heart size is unchanged. Mild right basilar airspace disease, possibly pneumonia. Innumerable pulmonary nodules measuring up to 2 cm stable to slightly increased from 2010. Clinical history supplied for the study performed 12 years ago was metastatic lung cancer. Correlate clinically. XR CHEST PORTABLE    Result Date: 2022  Atelectasis [facility] Department of Radiology [address], [place] [phone]    ========================================================================== Patient Name: Larissa Ann : 1950 Sex: F Age: Race: Black MRN: [id number] Pt. Location: [address] [place] [address] Patient Status: I Visit #: [id number] Ordered Date: 2022 3:20:00 PM Completed Date: 2022 03:51 PM Requesting Provider: Sagar Walsh Attending Provider: Ryan Fajardo Report Copy To: Signs ^ Symptoms: Acute Respiratory Distress History: Comments: Atelectasis Exam: PORTABLE CHEST 1 VIEW Accession #: [id number] ========================================================================== PORTABLE CHEST 1 VIEW  2022 3:51 PM  CLINICAL INDICATIONS: Acute Respiratory Distress  TECHNOLOGIST COMMENTS: Acute Respiratory Distress  QUESTION FOR THE RADIOLOGIST: Atelectasis  PROTOCOL: AP(PA) view was obtained. COMPARISON: 2022  FINDINGS: Stable cardiomediastinal silhouette. Innumerable bilateral pulmonary nodules as before. New haziness of the left lung base. Right lung remains grossly otherwise clear. No pneumothorax. Degenerative changes of the shoulders. Symmetric hemidiaphragms. IMPRESSION:  1. Slightly worsened left basilar aeration which may represent small effusion/atelectasis. 2.  Innumerable bilateral pulmonary nodules as before. Electronically signed: Roya . Transcribed by:  Berta, User Resident: Electronically Signed by: Cloyde Schlatter @ 2022 04:09 PM    CTA Abdomen and Pelvis    Result Date: 2022  Other, GI bleed [facility] Department of Radiology [address] [phone]    ========================================================================== Patient Name: Jericho Francois : 1950 Sex: F Age: Race: Black MRN: [id number] Pt. Location: [facility] Patient Status: E Visit #: [id number] Ordered Date: 2022 10:35:00 AM Completed Date: 2022 11:49 AM Requesting Provider: Robel Pizarro Attending Provider: Deann Buck Report Copy To: Signs ^ Symptoms: Abdominal Pain(specify) History: See Comments Comments: Other, GI bleed Exam: CTA ABDOMEN AND PELVIS Accession #: [id number] ========================================================================== CTA ABDOMEN AND PELVIS  2022 11:51 AM  CLINICAL INDICATIONS: Abdominal Pain(specify)  TECHNOLOGIST COMMENTS: known mass in abdomen  pt states she has had rectal bleeding both red and dark red  weakness overall hbg 6.2  QUESTION FOR THE RADIOLOGIST: Other, GI bleed  PROTOCOL: Axial CT angiography images were obtained with IV contrast.  CONTRAST: 100 mL Omnipaque 350  TECHNIQUE: Multidetector CT angiography axial slices of the abdomen and pelvis  were obtained with IV contrast. Multiplanar reformats, MIP, and volume rendered 3-D images were generated on a separate workstation and reviewed to further define anatomy and possible pathology. All CT scans at this facility use dose modulation, iterative reconstruction, and/or weight based dosing when appropriate to reduce radiation dose to as low as reasonably achievable. COMPARISON: 2022. CTA chest dictated separately  FINDINGS:  CT ABDOMEN: Minimal free fluid again noted. No new enhancing lesions in the liver, spleen, pancreas, adrenal glands or kidneys.  Extensive retroperitoneal adenopathy, retrocrural lymph nodes, encasement of the aorta similar to prior exam. The gallbladder is not dilated. No free air. No focal fluid collections. Stable left renal calcification. CT PELVIS: Complex enlarged cystic, probably calcified enlarged uterine mass similar to prior exam. Mild free fluid without developing focal fluid collections. Boothe catheter decompresses the bladder. Multiple dilated pelvic veins again noted. No bowel obstruction. Colonic diverticulosis. Constipation, moderate amount of stool throughout the colon again noted. No developing osseous destructive lesions. IMPRESSION: Uterine cancer, large pelvic mass, extensive adenopathy similar to the prior exam.  Mild ascites. Moderate amount of stool throughout the colon without obstruction. Electronically signed: Ary Stevenson. Transcribed by:  Jess, User Resident: Electronically Signed by: Reginald Phillips @ 07/24/2022 12:22 PM      ASSESSMENT & PLAN:    Anna Marie Agarwal is a 67 y.o. female [id number]  Pelvic Us  Pelvic mass  Patient Active Problem List    Diagnosis Date Noted    Chronic suppurative otitis media of right ear 08/11/2022     Priority: Medium    Vaginal bleeding 08/11/2022     Priority: Medium    Blood loss anemia 08/11/2022     Priority: Medium    Abdominal pain 08/11/2022     Priority: Medium    Gastrointestinal hemorrhage 08/09/2022     Priority: Medium    Exudative age-related macular degeneration of right eye with inactive scar ([address] 01/20/2021    Atrial flutter ([address] 01/20/2021    Adnexal mass 12/01/2016    Pelvic mass 12/01/2016    Thyroid nodule 12/01/2016    Hyperlipidemia 06/19/2015    Anxiety and depression 06/19/2015    Dermatomyositis ([address] 02/01/2013     replace inactive diagnosis      OA (osteoarthritis) 02/01/2013    Essential hypertension 09/21/2012    Diabetes mellitus type 2 in obese ([address] 09/21/2012    Fibroid     Lung nodule     Leiomyomatosis     Restrictive lung disease     Radiculopathy, cervical     Renal mass     DR (diabetic retinopathy) ([address]      replace inactive diagnosis Attending's Name: Dr. Dede Ledezma MD  Ob/Gyn Resident  Pager: [phone]  8/12/2022, 6:40 AM

## 2022-08-12 NOTE — PROGRESS NOTES
[address] [facility]  Alvino Hebert MD; Lea White MD; Corinne Lutz MD; MD Deborah Lopez MD; Brandy Russell MD    [facility]   [address] [facility]     Progress Note    8/11/2022    9:33 PM    Name:   Feng Hernandez  MRN:     [id number]     Acct:      [de-identified]   Room:   2092/2092-01  IP Day:  2  Admit Date:  8/9/2022  6:43 PM    PCP:   Frank Etienne MD  Code Status:  Full Code    Subjective:     C/C:   Chief Complaint   Patient presents with    GI Problem     ECF staff notice bright red blood in stool this PM.  Patient's lab indicated low hemoglobin yesterday. Interval History Status: not changed. Seen and examined   Hb dropped   Vaginal bleeding   OB/GYN consulted       Brief History:     Anna Marie Crowe is a 67 y.o. Non- / non  female who presents with GI Problem (ECF staff notice bright red blood in stool this PM.  Patient's lab indicated low hemoglobin yesterday.)   and is admitted to the hospital for the management of Gastrointestinal hemorrhage. Past medical history includes pelvic mass, DMT2, HTN,and HLD. Patient is able to provide very little contributing information. She was apparently sent to ED today after ECF staff noticing bright red rectal bleeding. She has a history of a similar admission to [facility] with vaginal and rectal bleeding but records from that hospitalization are not available currently. Patient has a history of a pelvic mass, adnexal mass and bilateral lung masses that are concerning for metastatic cancer. She missed several outpatient appointments with Dr. Jack Ruiz and there is no current work up or treatment plan documented. She had IVC filter placed on 7/26/22 due to history of bilateral PE and contraindication to anticoagulation. In ED, Hgb 7.2. Troponin 21 with repeat 19. EKG normal sinus without ST changes.    Symptoms are associated with shortness of breath which is chronic, bilateral leg swelling, rectal and vaginal bleeding. Denies fever, chills, chest pain, cough, abdominal pain, nausea, vomiting and diarrhea  There are no aggravating or alleviating factors. Symptoms are reported as acute, constant and moderate severity. Review of Systems:     Constitutional:  negative for chills, fevers, sweats  Respiratory:  negative for cough, dyspnea on exertion, shortness of breath, wheezing  Cardiovascular:  negative for chest pain, chest pressure/discomfort, lower extremity edema, palpitations  Gastrointestinal:  negative for abdominal pain, constipation, diarrhea, nausea, vomiting  Neurological:  negative for dizziness, headache    Medications:      Allergies:  No Known Allergies    Current Meds:   Scheduled Meds:    insulin lispro  0-8 Units SubCUTAneous TID WC    insulin lispro  0-4 Units SubCUTAneous Nightly    amoxicillin-clavulanate  1 tablet Oral 2 times per day    atorvastatin  10 mg Oral Daily    ferrous sulfate  325 mg Oral BID    gabapentin  300 mg Oral TID    sodium chloride flush  5-40 mL IntraVENous 2 times per day    pantoprazole (PROTONIX) 40 mg injection  40 mg IntraVENous Daily     Continuous Infusions:    sodium chloride      dextrose      sodium chloride      sodium chloride 150 mL/hr at 08/11/22 0152     PRN Meds: sodium chloride, glucose, dextrose bolus **OR** dextrose bolus, glucagon (rDNA), dextrose, sodium chloride flush, sodium chloride flush, sodium chloride, ondansetron **OR** ondansetron, acetaminophen **OR** acetaminophen    Data:     Past Medical History:   has a past medical history of Anxiety and depression, Anxiety and depression, Fibroid, Hyperlipidemia, Hypertension, Leiomyomatosis, Lung nodule, Obesity, Osteoarthritis, Radiculopathy, cervical, Renal mass, Restrictive lung disease, Retinal abnormality - diabetes related, and Type II or unspecified type diabetes mellitus without mention of complication, not stated as uncontrolled. Social History:   reports that she has never smoked. She has never used smokeless tobacco. She reports that she does not drink alcohol and does not use drugs. Family History:   Family History   Problem Relation Age of Onset    High Blood Pressure Mother     Diabetes Mother     Heart Disease Mother     Cancer Father        Vitals:  BP (!) 153/55   Pulse 94   Temp 97.6 °F (36.4 °C) (Oral)   Resp 18   Ht 5' 5\" (1.651 m)   Wt 185 lb 3 oz (84 kg)   SpO2 99%   BMI 30.82 kg/m²   Temp (24hrs), Av °F (36.7 °C), Min:97.6 °F (36.4 °C), Max:98.5 °F (36.9 °C)    Recent Labs     22  0625 22  1131 22  1617 22   POCGLU 89 167* 123* 122*       I/O (24Hr): Intake/Output Summary (Last 24 hours) at 2022 2133  Last data filed at 2022 0835  Gross per 24 hour   Intake 240 ml   Output --   Net 240 ml       Labs:  Hematology:  Recent Labs     22  1915 08/10/22  0047 08/10/22  0701 08/10/22  1138 22  0459 22  1310 22  1845   WBC 9.2  --  8.4  --  9.0  --   --    RBC 3.58*  --  3.92*  --  3.48*  --   --    HGB 7.2*   < > 7.5*   < > 6.8* 8.1* 7.9*   HCT 22.0*   < > 24.9*   < > 21.7* 26.3* 25.6*   MCV 61.5*  --  63.6*  --  62.4*  --   --    MCH 20.2*  --  19.0*  --  19.6*  --   --    MCHC 32.8  --  29.9*  --  31.4  --   --    RDW 26.0*  --  26.2*  --  25.7*  --   --      --  402  --  381  --   --    MPV 7.0  --  7.9  --  8.1  --   --    INR 1.1  --   --   --   --   --   --     < > = values in this interval not displayed.      Chemistry:  Recent Labs     08/09/22  1915 08/09/22  2115 08/10/22  0623 08/11/22  0459   *  --  137 138   K 4.0  --  4.5 4.2     --  103 107   CO2 27  --  25 27   GLUCOSE 206*  --  117* 105*   BUN 10  --  7* 5*   CREATININE 0.69  --  0.49* 0.47*   ANIONGAP 6*  --  9 4*   LABGLOM >60  --  >60 >60   GFRAA >60  --  >60 >60   CALCIUM 9.2  --  8.9 8.9   PROBNP 57  --   --   --    TROPHS 21* 19*  --   -- Recent Labs     08/10/22  1603 08/10/22  2008 08/11/22  0625 08/11/22  1131 08/11/22  1617 08/11/22 2053   POCGLU 152* 160* 89 167* 123* 122*     ABG:  Lab Results   Component Value Date/Time    PH 5.0 07/24/2022 11:21 AM    PCO2 30 06/26/2012 10:14 AM    HCO3 22.8 06/26/2012 10:14 AM     No results found for: SPECIAL  No results found for: CULTURE    Radiology:  CT ABDOMEN PELVIS W IV CONTRAST Additional Contrast? Oral    Result Date: 8/10/2022  1. Heterogeneous 13.4 cm lesion in the uterus, concerning for known uterine malignancy. Comparison with any prior outside examination and/or pelvic MRI with IV contrast may be useful further evaluation. 2. Macroscopic fat containing left adnexal lesion, likely a dermoid. 3. There is retroperitoneal, iliac chain, and pelvic lymphadenopathy. 4. Hepatic steatosis. Ill-defined hypoattenuating lesion in the left hepatic lobe is indeterminate. Hepatic protocol MRI or CT is recommended for further evaluation. 5. Innumerable pulmonary nodules with lymphadenopathy in the lower mediastinum, concerning for metastatic disease. 6. 1.8 cm hyperattenuating left renal lesion is indeterminate. Adrenal protocol MRI or CT is recommended for further evaluation. 7. Wall thickening in the distal colon/rectum. This could be seen with underdistention, but colitis would be difficult to exclude. Clinical correlation is recommended. 8. Small ascites. 9. There are few small lucent lesions in the lumbar spine, nonspecific. MRI could provide further information. XR CHEST PORTABLE    Result Date: 8/9/2022  Mild right basilar airspace disease, possibly pneumonia. Innumerable pulmonary nodules measuring up to 2 cm stable to slightly increased from 01/20/2010. Clinical history supplied for the study performed 12 years ago was metastatic lung cancer. Correlate clinically.        Physical Examination:        General appearance:  alert, cooperative and no distress  Mental Status:  oriented to multiple comorbid condition as above patient is high risk, poor prognosis        Disposition     Lynne Guajardo MD  8/11/2022  9:33 PM     Please note that this chart was generated using voice recognition Dragon dictation software. Although every effort was made to ensure the accuracy of this automated transcription, some errors in transcription may have occurred.

## 2022-08-12 NOTE — PROGRESS NOTES
GI Progress notes    8/12/2022   2:56 PM    Name:  Home Hicks  MRN:    [id number]     Acct:     [de-identified]   Room:  2092/2092-01   Day: 3     Admit Date: 8/9/2022  6:43 PM  PCP: Hailey Cheung MD    Subjective:     C/C:   Chief Complaint   Patient presents with    GI Problem     ECF staff notice bright red blood in stool this PM.  Patient's lab indicated low hemoglobin yesterday. Interval History: Status: not changed. Patient seen and examined  No acute events overnight  Hgb stable  No overt GI bleeding  GYN following, plans for pelvic US    ROS:  Constitutional: negative for chills, fevers and sweats  Gastrointestinal: negative for abdominal pain, constipation, diarrhea, nausea and vomiting  Neurological: negative for dizziness and headaches    Medications:      Allergies: No Known Allergies    Current Meds: 0.9 % sodium chloride infusion, PRN  glucose chewable tablet 16 g, PRN  dextrose bolus 10% 125 mL, PRN   Or  dextrose bolus 10% 250 mL, PRN  glucagon (rDNA) injection 1 mg, PRN  dextrose 10 % infusion, Continuous PRN  insulin lispro (HUMALOG) injection vial 0-8 Units, TID WC  insulin lispro (HUMALOG) injection vial 0-4 Units, Nightly  amoxicillin-clavulanate (AUGMENTIN) 875-125 MG per tablet 1 tablet, 2 times per day  sodium chloride flush 0.9 % injection 10 mL, PRN  atorvastatin (LIPITOR) tablet 10 mg, Daily  ferrous sulfate (IRON 325) tablet 325 mg, BID  gabapentin (NEURONTIN) capsule 300 mg, TID  sodium chloride flush 0.9 % injection 5-40 mL, 2 times per day  sodium chloride flush 0.9 % injection 5-40 mL, PRN  0.9 % sodium chloride infusion, PRN  ondansetron (ZOFRAN-ODT) disintegrating tablet 4 mg, Q8H PRN   Or  ondansetron (ZOFRAN) injection 4 mg, Q6H PRN  acetaminophen (TYLENOL) tablet 650 mg, Q6H PRN   Or  acetaminophen (TYLENOL) suppository 650 mg, Q6H PRN  0.9 % sodium chloride infusion, Continuous  pantoprazole (PROTONIX) 40 mg in sodium chloride (PF) 10 mL injection, Daily        Data:     Code Status:  Full Code    Family History   Problem Relation Age of Onset    High Blood Pressure Mother     Diabetes Mother     Heart Disease Mother     Cancer Father        Social History     Socioeconomic History    Marital status: Single     Spouse name: Not on file    Number of children: Not on file    Years of education: Not on file    Highest education level: Not on file   Occupational History    Not on file   Tobacco Use    Smoking status: Never    Smokeless tobacco: Never   Vaping Use    Vaping Use: Never used   Substance and Sexual Activity    Alcohol use: Never    Drug use: Never    Sexual activity: Not on file   Other Topics Concern    Not on file   Social History Narrative    Not on file     Social Determinants of Health     Financial Resource Strain: Low Risk     Difficulty of Paying Living Expenses: Not hard at all   Food Insecurity: No Food Insecurity    Worried About Running Out of Food in the Last Year: Never true    Ran Out of Food in the Last Year: Never true   Transportation Needs: Not on file   Physical Activity: Not on file   Stress: Not on file   Social Connections: Not on file   Intimate Partner Violence: Not on file   Housing Stability: Not on file       Vitals:  BP (!) 143/70   Pulse 87   Temp 97.5 °F (36.4 °C) (Oral)   Resp 18   Ht 5' 5\" (1.651 m)   Wt 179 lb 7.3 oz (81.4 kg)   SpO2 100%   BMI 29.86 kg/m²   Temp (24hrs), Av.6 °F (36.4 °C), Min:97.5 °F (36.4 °C), Max:97.8 °F (36.6 °C)    Recent Labs     22  1617 22  2053 22  0558 22  1144   POCGLU 123* 122* 133* 147*       I/O (24Hr):     Intake/Output Summary (Last 24 hours) at 2022 1456  Last data filed at 2022 1043  Gross per 24 hour   Intake 2752.41 ml   Output --   Net 2752.41 ml       Labs:      CBC:   Lab Results   Component Value Date/Time    WBC 8.4 2022 06:40 AM    RBC 3.91 2022 06:40 AM    RBC  2022 11:21 AM    HGB 8.0 2022 06:40 AM 08/04/2022 07:30 AM    AST 12 08/04/2022 07:30 AM    ALT 6 08/04/2022 07:30 AM     BMP:    Lab Results   Component Value Date/Time     08/12/2022 06:40 AM    K 4.1 08/12/2022 06:40 AM     08/12/2022 06:40 AM    CO2 27 08/12/2022 06:40 AM    BUN 4 08/12/2022 06:40 AM    LABALBU 3.3 08/04/2022 07:30 AM    LABALBU 3.6 07/24/2022 10:34 AM    CREATININE 0.52 08/12/2022 06:40 AM    CALCIUM 8.9 08/12/2022 06:40 AM    GFRAA >60 08/12/2022 06:40 AM    LABGLOM >60 08/12/2022 06:40 AM    GLUCOSE 125 08/12/2022 06:40 AM    GLUCOSE 109 07/24/2022 10:34 AM     PT/INR:    Lab Results   Component Value Date/Time    PROTIME 14.2 08/09/2022 07:15 PM    PROTIME 14.8 07/24/2022 10:34 AM    INR 1.1 08/09/2022 07:15 PM     PTT:    Lab Results   Component Value Date/Time    APTT 31.6 08/09/2022 07:15 PM    APTT 29.0 07/24/2022 10:34 AM   [APTT}    Physical Examination:        General appearance: alert, cooperative and no distress  Mental Status: oriented to person, place and time and normal affect  Abdomen: soft, nontender, nondistended, bowel sounds present   Extremities: no edema, redness or tenderness in the calves  Skin: no gross lesions, rashes, or induration    Assessment:        Primary Problem  Gastrointestinal hemorrhage     Active Hospital Problems    Diagnosis Date Noted    Lower GI bleed [K92.2] 08/12/2022     Priority: Medium    Chronic suppurative otitis media of right ear [H66.3X1] 08/11/2022     Priority: Medium    Vaginal bleeding [N93.9] 08/11/2022     Priority: Medium    Blood loss anemia [D50.0] 08/11/2022     Priority: Medium    Abdominal pain [R10.9] 08/11/2022     Priority: Medium    Gastrointestinal hemorrhage [K92.2] 08/09/2022     Priority: Medium    Pelvic mass [R19.00] 12/01/2016    Essential hypertension [I10] 09/21/2012    Diabetes mellitus type 2 in obese ([facility] [address] [E11.69, E66.9] 09/21/2012     Past Medical History:   Diagnosis Date    Anxiety and depression     Anxiety and depression     Fibroid Hyperlipidemia     Hypertension     Leiomyomatosis     Lung nodule     Obesity     Osteoarthritis     Radiculopathy, cervical     Renal mass     Restrictive lung disease     Retinal abnormality - diabetes related     Type II or unspecified type diabetes mellitus without mention of complication, not stated as uncontrolled         Plan:      Anemia, ? Rectal vs vaginal bleeding  Stool negative for occult blood  CT a/p significant for large uterine mass, extensive adenopathy, ill-defined lesion liver, renal lesion, innumerable lung nodules, small ascites  Oncology and GYN evaluation  Monitor for bleeding  Trend H&H  Transfuse for hgb < 7  GI signing off  Consult as needed     Time spent reviewing the chart, seeing the patient, and discussing with the attending MD around 30 minutes. Explained to the patient and d/W Nursing Staff  Will F/U with you  Please call or Page for any issues or change in status  Thanks    Electronically signed by CARLIN Anthony NP on 8/12/2022 at 2:56 PM         Patient seen along with GI APRN, and above management plan discussed. During our visit patient is stable no GI issues.

## 2022-08-12 NOTE — PLAN OF CARE
Problem: Discharge Planning  Goal: Discharge to home or other facility with appropriate resources  8/12/2022 1609 by Galdino Davis RN  Outcome: Progressing  Flowsheets (Taken 8/12/2022 1609)  Discharge to home or other facility with appropriate resources: Identify barriers to discharge with patient and caregiver     Problem: Pain  Goal: Verbalizes/displays adequate comfort level or baseline comfort level  8/12/2022 1609 by Jacquie Robins RN  Outcome: Progressing  Flowsheets (Taken 8/12/2022 1609)  Verbalizes/displays adequate comfort level or baseline comfort level: Encourage patient to monitor pain and request assistance     Problem: Skin/Tissue Integrity  Goal: Absence of new skin breakdown  Description: 1. Monitor for areas of redness and/or skin breakdown  2. Assess vascular access sites hourly  3. Every 4-6 hours minimum:  Change oxygen saturation probe site  4. Every 4-6 hours:  If on nasal continuous positive airway pressure, respiratory therapy assess nares and determine need for appliance change or resting period.   8/12/2022 1609 by Kecia Robins RN  Outcome: Progressing     Problem: Safety - Adult  Goal: Free from fall injury  8/12/2022 1609 by Galdino Davis RN  Outcome: Progressing

## 2022-08-12 NOTE — PROGRESS NOTES
Occupational Therapy  Facility/Department: [facility] PROGRESSIVE CARE  Daily Treatment Note  NAME: Anna Marie Villanueva  : 1950  MRN: [id number]    Date of Service: 2022    Discharge Recommendations:  Patient would benefit from continued therapy after discharge         Patient Diagnosis(es): The encounter diagnosis was Lower GI bleed. Assessment    Assessment: Pt reports frequent SOB and fatigue. Pts O2 remains in the high 90's. Writer provides education to pt on importance of deep breathing and pursed lip breathing tech. 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 Pt reports she needs to rest and will not complete any further therapy after BUE exercises completed. Plan   Plan  Times per Week: 5-7  Current Treatment Recommendations: Strengthening;Balance training;Functional mobility training; Endurance training;Pain management; Safety education & training;Patient/Caregiver education & training;Equipment evaluation, education, & procurement;Self-Care / ADL; Home management training;Coordination training         Subjective   Subjective  Subjective: \" I am out of breath all the time, this is just me. \"  Orientation  Overall Orientation Status: Within Functional Limits  Cognition  Overall Cognitive Status: Exceptions  Arousal/Alertness: Delayed responses to stimuli  Following Commands: Follows one step commands with increased time  Attention Span: Attends with cues to redirect  Memory: Decreased short term memory  Safety Judgement: Decreased awareness of need for assistance  Problem Solving: Assistance required to identify errors made  Insights: Decreased awareness of deficits  Initiation: Does not require cues  Sequencing: Requires cues for some        Objective    Vitals     Balance  Sitting: Intact (seated in bed side chair)     ADL  Feeding: Setup  Grooming: Stand by assistance  UE Bathing: Stand by assistance  LE Bathing: Minimal assistance  UE Dressing: Stand by assistance  LE Dressing:  Moderate assistance  Toileting: Moderate assistance  Additional Comments: ADL scores based on skilled observation only this date. OT Exercises  Exercise Treatment: Pt engaged in BUE exercises with med resistance 15 reps in all planes. Completed to increase strength and endurance for ADL and IADL activites. Pt requiring frequent  prolonged rest breaks due to SOB and fatigue . 08/12/22 1549   AM-PAC Daily Activity Inpatient    How much help for putting on and taking off regular lower body clothing? 2   How much help for Bathing? 3   How much help for Toileting? 2   How much help for putting on and taking off regular upper body clothing? 3   How much help for taking care of personal grooming? 3   How much help for eating meals? 3   AM-[facility] Inpatient Daily Activity Raw Score 16   AM-[facility] Inpatient ADL T-Scale Score  35.96   ADL Inpatient CMS 0-100% Score 53.32   ADL Inpatient CMS G-Code Modifier  CK           Patient Education  Education Given To: Patient  Education Provided: Role of Therapy;Plan of Care; Fall Prevention Strategies; Home Exercise Program  Education Method: Verbal;Printed Information/Hand-outs  Barriers to Learning: None  Education Outcome: Verbalized understanding    Goals  Short Term Goals  Time Frame for Short term goals: By discharge  Short Term Goal 1: Pt will perform UB ADLs with Mod I and good safety  Short Term Goal 2: Pt will perform LB ADLs with SBA, good safety, and use of AE/mod techniques as needed  Short Term Goal 3: Pt will complete functional transfers/mobility during self-care tasks with SBA and Good safety while maintaining SpO2 above 90%  Short Term Goal 4: Pt will tolerate standing 5+ minutes during functional activity of choice with Good safety while maintaining Spo2 above 90%  Short Term Goal 5: Pt will actively participate in 15+ minutes of therapeutic exercise/functional activity to improve safety and independence with self-care and mobility  Short Term Goal 6: Pt will be educated on and explore use of DME/AE and modified techniques for increasing ease and independence with ADLs upon d/c       Therapy Time   Individual Concurrent Group Co-treatment   Time In 1503         Time Out 1528         Minutes 25                 CONCHITA Mitchell

## 2022-08-12 NOTE — PROGRESS NOTES
BETY ROSARIO [facility] Internal Medicine  Gudelia Kapoor MD; Balbir Martin MD; Adeel Salmeron MD; Winnie Closs, MD Loreen Lords, MD; MD [facility]   [address] [facility]     Progress Note    8/12/2022    2:19 PM    Name:   Sobeida De Jesus  MRN:     [id number]     Acct:      [de-identified]   Room:   2092/2092-01  IP Day:  3  Admit Date:  8/9/2022  6:43 PM    PCP:   Brian Bauer MD  Code Status:  Full Code    Subjective:     C/C:   Chief Complaint   Patient presents with    GI Problem     ECF staff notice bright red blood in stool this PM.  Patient's lab indicated low hemoglobin yesterday. Interval History Status: not changed. Seen and examined   Hb dropped   Vaginal bleeding   OB/GYN consulted       Brief History:     Anna Marie Guillory is a 67 y.o. Non- / non  female who presents with GI Problem (ECF staff notice bright red blood in stool this PM.  Patient's lab indicated low hemoglobin yesterday.)   and is admitted to the hospital for the management of Gastrointestinal hemorrhage. Past medical history includes pelvic mass, DMT2, HTN,and HLD. Patient is able to provide very little contributing information. She was apparently sent to ED today after ECF staff noticing bright red rectal bleeding. She has a history of a similar admission to [facility] with vaginal and rectal bleeding but records from that hospitalization are not available currently. Patient has a history of a pelvic mass, adnexal mass and bilateral lung masses that are concerning for metastatic cancer. She missed several outpatient appointments with Dr. Brandon Sumner and there is no current work up or treatment plan documented. She had IVC filter placed on 7/26/22 due to history of bilateral PE and contraindication to anticoagulation. In ED, Hgb 7.2. Troponin 21 with repeat 19. EKG normal sinus without ST changes.    Symptoms are associated with shortness of breath which is chronic, bilateral leg swelling, rectal and vaginal bleeding. Denies fever, chills, chest pain, cough, abdominal pain, nausea, vomiting and diarrhea  There are no aggravating or alleviating factors. Symptoms are reported as acute, constant and moderate severity. Review of Systems:     Constitutional:  negative for chills, fevers, sweats  Respiratory:  negative for cough, dyspnea on exertion, shortness of breath, wheezing  Cardiovascular:  negative for chest pain, chest pressure/discomfort, lower extremity edema, palpitations  Gastrointestinal:  negative for abdominal pain, constipation, diarrhea, nausea, vomiting  Neurological:  negative for dizziness, headache    Medications:      Allergies:  No Known Allergies    Current Meds:   Scheduled Meds:    insulin lispro  0-8 Units SubCUTAneous TID WC    insulin lispro  0-4 Units SubCUTAneous Nightly    amoxicillin-clavulanate  1 tablet Oral 2 times per day    atorvastatin  10 mg Oral Daily    ferrous sulfate  325 mg Oral BID    gabapentin  300 mg Oral TID    sodium chloride flush  5-40 mL IntraVENous 2 times per day    pantoprazole (PROTONIX) 40 mg injection  40 mg IntraVENous Daily     Continuous Infusions:    sodium chloride      dextrose      sodium chloride      sodium chloride 150 mL/hr at 08/12/22 1043     PRN Meds: sodium chloride, glucose, dextrose bolus **OR** dextrose bolus, glucagon (rDNA), dextrose, sodium chloride flush, sodium chloride flush, sodium chloride, ondansetron **OR** ondansetron, acetaminophen **OR** acetaminophen    Data:     Past Medical History:   has a past medical history of Anxiety and depression, Anxiety and depression, Fibroid, Hyperlipidemia, Hypertension, Leiomyomatosis, Lung nodule, Obesity, Osteoarthritis, Radiculopathy, cervical, Renal mass, Restrictive lung disease, Retinal abnormality - diabetes related, and Type II or unspecified type diabetes mellitus without mention of complication, not stated as uncontrolled. Social History:   reports that she has never smoked. She has never used smokeless tobacco. She reports that she does not drink alcohol and does not use drugs. Family History:   Family History   Problem Relation Age of Onset    High Blood Pressure Mother     Diabetes Mother     Heart Disease Mother     Cancer Father        Vitals:  BP (!) 143/70   Pulse 87   Temp 97.5 °F (36.4 °C) (Oral)   Resp 18   Ht 5' 5\" (1.651 m)   Wt 179 lb 7.3 oz (81.4 kg)   SpO2 100%   BMI 29.86 kg/m²   Temp (24hrs), Av.6 °F (36.4 °C), Min:97.5 °F (36.4 °C), Max:97.8 °F (36.6 °C)    Recent Labs     22  1617 22  2053 22  0558 22  1144   POCGLU 123* 122* 133* 147*         I/O (24Hr): Intake/Output Summary (Last 24 hours) at 2022 1419  Last data filed at 2022 1043  Gross per 24 hour   Intake 2752.41 ml   Output --   Net 2752.41 ml         Labs:  Hematology:  Recent Labs     22  1915 08/10/22  0047 08/10/22  0701 08/10/22  1138 22  0459 22  1310 22  1845 22  0059 22  0640   WBC 9.2  --  8.4  --  9.0  --   --   --  8.4   RBC 3.58*  --  3.92*  --  3.48*  --   --   --  3.91*   HGB 7.2*   < > 7.5*   < > 6.8*   < > 7.9* 7.6* 8.0*   HCT 22.0*   < > 24.9*   < > 21.7*   < > 25.6* 25.1* 25.1*   MCV 61.5*  --  63.6*  --  62.4*  --   --   --  64.3*   MCH 20.2*  --  19.0*  --  19.6*  --   --   --  20.5*   MCHC 32.8  --  29.9*  --  31.4  --   --   --  31.9   RDW 26.0*  --  26.2*  --  25.7*  --   --   --  27.1*     --  402  --  381  --   --   --  386   MPV 7.0  --  7.9  --  8.1  --   --   --  6.3   INR 1.1  --   --   --   --   --   --   --   --     < > = values in this interval not displayed.        Chemistry:  Recent Labs     22  1915 22  2115 08/10/22  0623 22  0459 22  0640   *  --  137 138 139   K 4.0  --  4.5 4.2 4.1     --  103 107 107   CO2 27  --  25 27 27   GLUCOSE 206*  --  117* 105* 125*   BUN 10  -- 7* 5* 4*   CREATININE 0.69  --  0.49* 0.47* 0.52   ANIONGAP 6*  --  9 4* 5*   LABGLOM >60  --  >60 >60 >60   GFRAA >60  --  >60 >60 >60   CALCIUM 9.2  --  8.9 8.9 8.9   PROBNP 57  --   --   --   --    TROPHS 21* 19*  --   --   --        Recent Labs     08/11/22  0625 08/11/22  1131 08/11/22  1617 08/11/22  2053 08/12/22  0558 08/12/22  1144   POCGLU 89 167* 123* 122* 133* 147*       ABG:  Lab Results   Component Value Date/Time    PH 5.0 07/24/2022 11:21 AM    PCO2 30 06/26/2012 10:14 AM    HCO3 22.8 06/26/2012 10:14 AM     No results found for: SPECIAL  No results found for: CULTURE    Radiology:  CT ABDOMEN PELVIS W IV CONTRAST Additional Contrast? Oral    Result Date: 8/10/2022  1. Heterogeneous 13.4 cm lesion in the uterus, concerning for known uterine malignancy. Comparison with any prior outside examination and/or pelvic MRI with IV contrast may be useful further evaluation. 2. Macroscopic fat containing left adnexal lesion, likely a dermoid. 3. There is retroperitoneal, iliac chain, and pelvic lymphadenopathy. 4. Hepatic steatosis. Ill-defined hypoattenuating lesion in the left hepatic lobe is indeterminate. Hepatic protocol MRI or CT is recommended for further evaluation. 5. Innumerable pulmonary nodules with lymphadenopathy in the lower mediastinum, concerning for metastatic disease. 6. 1.8 cm hyperattenuating left renal lesion is indeterminate. Adrenal protocol MRI or CT is recommended for further evaluation. 7. Wall thickening in the distal colon/rectum. This could be seen with underdistention, but colitis would be difficult to exclude. Clinical correlation is recommended. 8. Small ascites. 9. There are few small lucent lesions in the lumbar spine, nonspecific. MRI could provide further information. XR CHEST PORTABLE    Result Date: 8/9/2022  Mild right basilar airspace disease, possibly pneumonia. Innumerable pulmonary nodules measuring up to 2 cm stable to slightly increased from 01/20/2010. Clinical history supplied for the study performed 12 years ago was metastatic lung cancer. Correlate clinically.        Physical Examination:        General appearance:  alert, cooperative and no distress  Mental Status:  oriented to person, place and time and normal affect  Lungs:  clear to auscultation bilaterally, normal effort  Heart:  regular rate and rhythm, no murmur  Abdomen:  soft, nontender, nondistended, normal bowel sounds, no masses, hepatomegaly, splenomegaly  Extremities:  no edema, redness, tenderness in the calves  Skin:  no gross lesions, rashes, induration    Assessment:        Hospital Problems             Last Modified POA    * (Principal) Gastrointestinal hemorrhage 8/9/2022 Yes    Chronic suppurative otitis media of right ear 8/11/2022 Yes    Vaginal bleeding 8/11/2022 Yes    Blood loss anemia 8/11/2022 Yes    Abdominal pain 8/11/2022 Yes    Lower GI bleed 8/12/2022 Yes    Essential hypertension 8/11/2022 Yes    Diabetes mellitus type 2 in obese ([address] 8/11/2022 Yes    Pelvic mass 8/10/2022 Yes     Plan:        76-year-old female with underlying history of hypertension, hyperlipidemia, diabetes,  Abdominal mass which she was supposed to follow-up with oncology, missed 3 appointments, I discussed at length with oncologist their office sent a letter to the patient,  I had a long discussion with patient and her daughter today, patient did admit that she adamantly miss appointments those were more important, she has been following with the doctors for her eye surgery and exam but did not follow for her abdominal mass which on the CT of the abdomen and pelvis looked like she had extensive mets, no biopsy found in the system,  CAT scan report, radiology mentioned uterine cancer, no biopsy done and pathological reports available at this time,  Admitted on this admission for anemia and possible rectal bleeding, gastroenterology on board, hemoglobin has been staying stable above 7,  No active bleeding at this time,  Chronic anemia possible due to underlying malignancy,  Morbid obesity,  Chronic abdominal pain,  Chronic right ear infection for which we have given her Augmentin at this time seems like she has not seen any ENT physician and ear canal looks extensively inflamed and eroded,    Aug 12  Hemoglobin stable today status post 1 unit of PRBC,  OB/GYN still have not seen the patient yet,  Patient will follow with oncology as outpatient,  Waiting for precert        Madelin Daigle MD  8/12/2022  2:19 PM     Please note that this chart was generated using voice recognition Dragon dictation software. Although every effort was made to ensure the accuracy of this automated transcription, some errors in transcription may have occurred.

## 2022-08-12 NOTE — CONSULTS
Today's Date: 2022  Patient Name: Anna Marie Villanueva  Date of admission: 2022  6:43 PM  Patient's age: 67 y.o., 1950  Admission Dx: Gastrointestinal hemorrhage [K92.2]  Lower GI bleed [K92.2]    Reason for Consult: management recommendations  Requesting Physician: Omkar Mathew MD    CHIEF COMPLAINT: GI bleed    History Obtained From:  patient    Interval history  Vaginal bleed slow down  Seen by gynecology  Hemoglobin stable      HISTORY OF PRESENT ILLNESS:      The patient is a 67 y.o.  female who is admitted to the hospital for GI bleed the staff noticed bright red blood in the stool, similar admission to the hospital at [facility] with vaginal and rectal bleed work-up was done is unavailable at this time, patient had pelvic mass adnexal mass and bilateral lung masses concerning for metastatic endometrial cancer she missed several appointment with Dr. Lolis Valle also she does have IVC filter after she had bilateral pulmonary embolism started on anticoagulation complicated by GI bleed, she did have abdominal pain no fever or chills or weight loss or nausea or vomiting  CT abdomen on  did show extensive retroperitoneal adenopathy encasement of the aorta and multiple pelvic vein noted no bowel obstruction    Past Medical History:   has a past medical history of Anxiety and depression, Anxiety and depression, Fibroid, Hyperlipidemia, Hypertension, Leiomyomatosis, Lung nodule, Obesity, Osteoarthritis, Radiculopathy, cervical, Renal mass, Restrictive lung disease, Retinal abnormality - diabetes related, and Type II or unspecified type diabetes mellitus without mention of complication, not stated as uncontrolled. Past Surgical History:   has a past surgical history that includes Lung biopsy; Cardiac catheterization; and  section. Medications:    Reviewed in Epic     Allergies:  Patient has no known allergies. Social History:   reports that she has never smoked.  She has never used smokeless tobacco. She reports that she does not drink alcohol and does not use drugs. Family History: family history includes Cancer in her father; Diabetes in her mother; Heart Disease in her mother; High Blood Pressure in her mother. REVIEW OF SYSTEMS:    Constitutional: No fever or chills. No night sweats, no weight loss   Eyes: No eye discharge, double vision, or eye pain   HEENT: negative for sore mouth, sore throat, hoarseness and voice change   Respiratory: negative for cough , sputum, dyspnea, wheezing, hemoptysis, chest pain   Cardiovascular: negative for chest pain, dyspnea, palpitations, orthopnea, PND   Gastrointestinal: negative for nausea, vomiting, diarrhea, constipation, positive for abdominal pain, no dysphagia, hematemesis positive for hematochezia   Genitourinary: negative for frequency, dysuria, nocturia, urinary incontinence, and hematuria   Integument: negative for rash, skin lesions, bruises.    Hematologic/Lymphatic: negative for easy bruising, bleeding, lymphadenopathy, or petechiae   Endocrine: negative for heat or cold intolerance,weight changes, change in bowel habits and hair loss   Musculoskeletal: negative for myalgias, arthralgias, pain, joint swelling,and bone pain   Neurological: negative for headaches, dizziness, seizures, weakness, numbness    PHYSICAL EXAM:      BP (!) 143/70   Pulse 87   Temp 97.5 °F (36.4 °C) (Oral)   Resp 18   Ht 5' 5\" (1.651 m)   Wt 179 lb 7.3 oz (81.4 kg)   SpO2 100%   BMI 29.86 kg/m²    Temp (24hrs), Av.6 °F (36.4 °C), Min:97.5 °F (36.4 °C), Max:97.8 °F (36.6 °C)    General appearance - well appearing, no in pain or distress   Mental status - alert and cooperative   Eyes - pupils equal and reactive, extraocular eye movements intact   Ears - bilateral TM's and external ear canals normal   Mouth - mucous membranes moist, pharynx normal without lesions   Neck - supple, no significant adenopathy   Lymphatics - no palpable lymphadenopathy, no hepatosplenomegaly   Chest - clear to auscultation, no wheezes, rales or rhonchi, symmetric air entry   Heart - normal rate, regular rhythm, normal S1, S2, no murmurs  Abdomen - soft, tender, nondistended, pelvic mass palpable and tender  Neurological - alert, oriented, normal speech, no focal findings or movement disorder noted   Musculoskeletal - no joint tenderness, deformity or swelling   Extremities - peripheral pulses normal, no pedal edema, no clubbing or cyanosis   Skin - normal coloration and turgor, no rashes, no suspicious skin lesions noted ,    DATA:    Labs:   CBC:   Recent Labs     08/11/22  0459 08/11/22  1310 08/12/22  0059 08/12/22  0640   WBC 9.0  --   --  8.4   HGB 6.8*   < > 7.6* 8.0*   HCT 21.7*   < > 25.1* 25.1*     --   --  386    < > = values in this interval not displayed. BMP:   Recent Labs     08/11/22  0459 08/12/22  0640    139   K 4.2 4.1   CO2 27 27   BUN 5* 4*   CREATININE 0.47* 0.52   LABGLOM >60 >60   GLUCOSE 105* 125*       PT/INR:   Recent Labs     08/09/22  1915   PROTIME 14.2   INR 1.1         IMAGING DATA:  CT ABDOMEN PELVIS W IV CONTRAST Additional Contrast? Oral   Final Result   1. Heterogeneous 13.4 cm lesion in the uterus, concerning for known uterine   malignancy. Comparison with any prior outside examination and/or pelvic MRI   with IV contrast may be useful further evaluation. 2. Macroscopic fat containing left adnexal lesion, likely a dermoid. 3. There is retroperitoneal, iliac chain, and pelvic lymphadenopathy. 4. Hepatic steatosis. Ill-defined hypoattenuating lesion in the left hepatic   lobe is indeterminate. Hepatic protocol MRI or CT is recommended for further   evaluation. 5. Innumerable pulmonary nodules with lymphadenopathy in the lower   mediastinum, concerning for metastatic disease. 6. 1.8 cm hyperattenuating left renal lesion is indeterminate.   Adrenal   protocol MRI or CT is recommended for further evaluation. 7. Wall thickening in the distal colon/rectum. This could be seen with   underdistention, but colitis would be difficult to exclude. Clinical   correlation is recommended. 8. Small ascites. 9. There are few small lucent lesions in the lumbar spine, nonspecific. MRI   could provide further information. XR CHEST PORTABLE   Final Result   Mild right basilar airspace disease, possibly pneumonia. Innumerable pulmonary nodules measuring up to 2 cm stable to slightly   increased from 01/20/2010. Clinical history supplied for the study performed   12 years ago was metastatic lung cancer. Correlate clinically.          US PELVIS COMPLETE    (Results Pending)       Primary Problem  Gastrointestinal hemorrhage    Active Hospital Problems    Diagnosis Date Noted    Lower GI bleed [K92.2] 08/12/2022     Priority: Medium    Chronic suppurative otitis media of right ear [H66.3X1] 08/11/2022     Priority: Medium    Vaginal bleeding [N93.9] 08/11/2022     Priority: Medium    Blood loss anemia [D50.0] 08/11/2022     Priority: Medium    Abdominal pain [R10.9] 08/11/2022     Priority: Medium    Gastrointestinal hemorrhage [K92.2] 08/09/2022     Priority: Medium    Pelvic mass [R19.00] 12/01/2016    Essential hypertension [I10] 09/21/2012    Diabetes mellitus type 2 in obese ([facility] [address] [E11.69, E66.9] 09/21/2012         IMPRESSION:   Pelvic mass/abdominal lymphadenopathy  Abdominal pain related to above  Anemia  GI bleed?/Vaginal bleed    RECOMMENDATIONS:  I reviewed the laboratory data, imaging studies, discussed the diagnosis and possible treatment    Patient presented with abdominal pain GI bleed/vaginal bleed she did had CT abdomen before where there is extensive retroperitoneal adenopathy and complex large cystic uterine mass with mild ascites , there was encasement of the aorta but no obstructive symptoms this is compatible with metastatic carcinoma likely endometrial,   Differential CT abdomen pelvis there is no evidence of obstruction or acute finding and, 334> pelvic ultrasound ordered by gynecology  Gynecology consulted and ongoing work-up with pelvic ultrasound  Monitor H&H and keep hemoglobin above 7  From oncology she is ready to be discharged and follow-up as an outpatient,I reviewed the case with Dr. Barreto Flair patient can have biopsy in the office next week, in general the treatment is palliative in nature and involve systemic chemotherapy even given her comorbidity prognosis poor    Discussed with patient and Nurse. Thank you for asking us to see this patient. Adolfo Oz[facility]                            This note is created with the assistance of a speech recognition program.  While intending to generate a document that actually reflects the content of the visit, the document can still have some errors including those of syntax and sound a like substitutions which may escape proof reading. It such instances, actual meaning can be extrapolated by contextual diversion.      Hematologist/Medical Oncologist

## 2022-08-12 NOTE — CARE COORDINATION
Authorization still pending for patient to go to [facility]. Electronically signed by PETE Finley on 8/12/2022 at 11:47 AM     Informed OV that updated notes are in.  Electronically signed by PETE Finley on 8/12/2022 at 4:27 PM

## 2022-08-12 NOTE — PLAN OF CARE
Problem: Discharge Planning  Goal: Discharge to home or other facility with appropriate resources  8/12/2022 0345 by Alexandr Espinoza RN  Outcome: Progressing  8/11/2022 1603 by Renea Duarte RN  Outcome: Progressing     Problem: Pain  Goal: Verbalizes/displays adequate comfort level or baseline comfort level  8/12/2022 0345 by Alexandr Espinoza RN  Outcome: Progressing  Note: Patient complains of low pelvic pain with urination. Lays on side for comfort. Abdomen with ascites. 8/11/2022 1603 by Renea Duarte RN  Outcome: Progressing     Problem: Skin/Tissue Integrity  Goal: Absence of new skin breakdown  Description: 1. Monitor for areas of redness and/or skin breakdown  2. Assess vascular access sites hourly  3. Every 4-6 hours minimum:  Change oxygen saturation probe site  4. Every 4-6 hours:  If on nasal continuous positive airway pressure, respiratory therapy assess nares and determine need for appliance change or resting period. 8/12/2022 0345 by Alexandr Espinoza RN  Outcome: Progressing  Note: Buttocks reddened. Patient with rectal prolapse. Hemeglobin monitored  8/11/2022 1603 by Renea Duarte RN  Outcome: Progressing     Problem: Safety - Adult  Goal: Free from fall injury  8/12/2022 0345 by Alexandr Espinoza RN  Outcome: Progressing  Note: Patient weak. Bed alarm on. Alert and oriented.     8/11/2022 1603 by Renea Duarte RN  Outcome: Progressing     Problem: ABCDS Injury Assessment  Goal: Absence of physical injury  8/12/2022 0345 by Alexandr Espinoza RN  Outcome: Progressing  8/11/2022 1603 by Renea Duarte RN  Outcome: Progressing     Problem: Chronic Conditions and Co-morbidities  Goal: Patient's chronic conditions and co-morbidity symptoms are monitored and maintained or improved  8/12/2022 0345 by Alexandr Espinoza RN  Outcome: Progressing  8/11/2022 1603 by Renea Duarte RN  Outcome: Progressing

## 2022-08-13 ENCOUNTER — APPOINTMENT (OUTPATIENT)
Dept: GENERAL RADIOLOGY | Age: 72
DRG: 530 | End: 2022-08-13
Payer: MEDICAID

## 2022-08-13 PROBLEM — C54.1 ENDOMETRIAL CANCER (HCC): Status: ACTIVE | Noted: 2022-01-01

## 2022-08-13 LAB
ABSOLUTE EOS #: 0.12 K/UL (ref 0–0.4)
ABSOLUTE LYMPH #: 1.61 K/UL (ref 1–4.8)
ABSOLUTE MONO #: 0.81 K/UL (ref 0.1–1.3)
ALBUMIN SERPL-MCNC: 3.5 G/DL (ref 3.5–5.2)
ALP BLD-CCNC: 58 U/L (ref 35–104)
ALT SERPL-CCNC: 5 U/L (ref 5–33)
ANION GAP SERPL CALCULATED.3IONS-SCNC: 9 MMOL/L (ref 9–17)
AST SERPL-CCNC: 11 U/L
BASOPHILS # BLD: 1 % (ref 0–2)
BASOPHILS ABSOLUTE: 0.12 K/UL (ref 0–0.2)
BILIRUB SERPL-MCNC: 0.44 MG/DL (ref 0.3–1.2)
BUN BLDV-MCNC: 6 MG/DL (ref 8–23)
CALCIUM SERPL-MCNC: 9.5 MG/DL (ref 8.6–10.4)
CHLORIDE BLD-SCNC: 101 MMOL/L (ref 98–107)
CO2: 25 MMOL/L (ref 20–31)
CREAT SERPL-MCNC: 0.48 MG/DL (ref 0.5–0.9)
EOSINOPHILS RELATIVE PERCENT: 1 % (ref 0–4)
GFR AFRICAN AMERICAN: >60 ML/MIN
GFR NON-AFRICAN AMERICAN: >60 ML/MIN
GFR SERPL CREATININE-BSD FRML MDRD: ABNORMAL ML/MIN/{1.73_M2}
GLUCOSE BLD-MCNC: 103 MG/DL (ref 65–105)
GLUCOSE BLD-MCNC: 109 MG/DL (ref 70–99)
GLUCOSE BLD-MCNC: 111 MG/DL (ref 65–105)
GLUCOSE BLD-MCNC: 141 MG/DL (ref 65–105)
GLUCOSE BLD-MCNC: 155 MG/DL (ref 65–105)
HCT VFR BLD CALC: 28.4 % (ref 36–46)
HEMOGLOBIN: 8.5 G/DL (ref 12–16)
LIPASE: 31 U/L (ref 13–60)
LYMPHOCYTES # BLD: 14 % (ref 24–44)
MCH RBC QN AUTO: 19.7 PG (ref 26–34)
MCHC RBC AUTO-ENTMCNC: 29.9 G/DL (ref 31–37)
MCV RBC AUTO: 65.9 FL (ref 80–100)
MONOCYTES # BLD: 7 % (ref 1–7)
MORPHOLOGY: ABNORMAL
PDW BLD-RTO: 28.3 % (ref 11.5–14.9)
PLATELET # BLD: 377 K/UL (ref 150–450)
PMV BLD AUTO: 8.1 FL (ref 6–12)
POTASSIUM SERPL-SCNC: 4.2 MMOL/L (ref 3.7–5.3)
RBC # BLD: 4.32 M/UL (ref 4–5.2)
SEG NEUTROPHILS: 77 % (ref 36–66)
SEGMENTED NEUTROPHILS ABSOLUTE COUNT: 8.84 K/UL (ref 1.3–9.1)
SODIUM BLD-SCNC: 135 MMOL/L (ref 135–144)
TOTAL PROTEIN: 7.4 G/DL (ref 6.4–8.3)
WBC # BLD: 11.5 K/UL (ref 3.5–11)

## 2022-08-13 PROCEDURE — 2060000000 HC ICU INTERMEDIATE R&B

## 2022-08-13 PROCEDURE — 85025 COMPLETE CBC W/AUTO DIFF WBC: CPT

## 2022-08-13 PROCEDURE — 6370000000 HC RX 637 (ALT 250 FOR IP): Performed by: NURSE PRACTITIONER

## 2022-08-13 PROCEDURE — 6370000000 HC RX 637 (ALT 250 FOR IP): Performed by: INTERNAL MEDICINE

## 2022-08-13 PROCEDURE — 83690 ASSAY OF LIPASE: CPT

## 2022-08-13 PROCEDURE — 36415 COLL VENOUS BLD VENIPUNCTURE: CPT

## 2022-08-13 PROCEDURE — 80053 COMPREHEN METABOLIC PANEL: CPT

## 2022-08-13 PROCEDURE — 99233 SBSQ HOSP IP/OBS HIGH 50: CPT | Performed by: INTERNAL MEDICINE

## 2022-08-13 PROCEDURE — 99232 SBSQ HOSP IP/OBS MODERATE 35: CPT | Performed by: INTERNAL MEDICINE

## 2022-08-13 PROCEDURE — 2500000003 HC RX 250 WO HCPCS: Performed by: INTERNAL MEDICINE

## 2022-08-13 PROCEDURE — 74018 RADEX ABDOMEN 1 VIEW: CPT

## 2022-08-13 PROCEDURE — 82947 ASSAY GLUCOSE BLOOD QUANT: CPT

## 2022-08-13 RX ORDER — PANTOPRAZOLE SODIUM 40 MG/1
40 TABLET, DELAYED RELEASE ORAL
Status: DISCONTINUED | OUTPATIENT
Start: 2022-08-14 | End: 2022-08-16 | Stop reason: HOSPADM

## 2022-08-13 RX ORDER — HYDROCODONE BITARTRATE AND ACETAMINOPHEN 5; 325 MG/1; MG/1
1 TABLET ORAL EVERY 6 HOURS PRN
Status: DISCONTINUED | OUTPATIENT
Start: 2022-08-13 | End: 2022-08-16 | Stop reason: HOSPADM

## 2022-08-13 RX ORDER — MORPHINE SULFATE 2 MG/ML
1 INJECTION, SOLUTION INTRAMUSCULAR; INTRAVENOUS EVERY 4 HOURS PRN
Status: DISCONTINUED | OUTPATIENT
Start: 2022-08-13 | End: 2022-08-13

## 2022-08-13 RX ORDER — MORPHINE SULFATE 2 MG/ML
1 INJECTION, SOLUTION INTRAMUSCULAR; INTRAVENOUS EVERY 4 HOURS PRN
Status: DISCONTINUED | OUTPATIENT
Start: 2022-08-13 | End: 2022-08-16 | Stop reason: HOSPADM

## 2022-08-13 RX ADMIN — GABAPENTIN 300 MG: 300 CAPSULE ORAL at 15:17

## 2022-08-13 RX ADMIN — ATORVASTATIN CALCIUM 10 MG: 10 TABLET, FILM COATED ORAL at 10:27

## 2022-08-13 RX ADMIN — GABAPENTIN 300 MG: 300 CAPSULE ORAL at 10:27

## 2022-08-13 RX ADMIN — MORPHINE SULFATE 1 MG: 2 INJECTION, SOLUTION INTRAMUSCULAR; INTRAVENOUS at 11:58

## 2022-08-13 RX ADMIN — AMOXICILLIN AND CLAVULANATE POTASSIUM 1 TABLET: 875; 125 TABLET, FILM COATED ORAL at 10:27

## 2022-08-13 RX ADMIN — AMOXICILLIN AND CLAVULANATE POTASSIUM 1 TABLET: 875; 125 TABLET, FILM COATED ORAL at 21:36

## 2022-08-13 RX ADMIN — FERROUS SULFATE TAB 325 MG (65 MG ELEMENTAL FE) 325 MG: 325 (65 FE) TAB at 10:27

## 2022-08-13 RX ADMIN — FERROUS SULFATE TAB 325 MG (65 MG ELEMENTAL FE) 325 MG: 325 (65 FE) TAB at 21:36

## 2022-08-13 RX ADMIN — GABAPENTIN 300 MG: 300 CAPSULE ORAL at 21:36

## 2022-08-13 ASSESSMENT — PAIN - FUNCTIONAL ASSESSMENT: PAIN_FUNCTIONAL_ASSESSMENT: PREVENTS OR INTERFERES WITH MANY ACTIVE NOT PASSIVE ACTIVITIES

## 2022-08-13 ASSESSMENT — PAIN DESCRIPTION - DESCRIPTORS: DESCRIPTORS: OTHER (COMMENT)

## 2022-08-13 ASSESSMENT — PAIN DESCRIPTION - LOCATION: LOCATION: ABDOMEN

## 2022-08-13 ASSESSMENT — PAIN SCALES - GENERAL
PAINLEVEL_OUTOF10: 10
PAINLEVEL_OUTOF10: 5
PAINLEVEL_OUTOF10: 10

## 2022-08-13 ASSESSMENT — PAIN DESCRIPTION - ORIENTATION: ORIENTATION: RIGHT

## 2022-08-13 NOTE — FLOWSHEET NOTE
Patient states very light headed while sitting at side of bed. Acting intoxicated. States pain medication made her feel drunk. Assisted patient back into bed. Telesitter inititated. Patient instructed not to get out of bed without assistance.

## 2022-08-13 NOTE — FLOWSHEET NOTE
Patient called out complaining of severe right side/flank pain. Sudden onset. Patient appears distressed, sitting at side of bed, grasping handrail. VSS as charted. Rates 10/10. Cannot describe \"just hurts, its a 10!\". PC to Dr. Laura Cates, updated. Orders received for IV MS and labwork. Attempted to start IV and patient refuses. States she does not want any blood work and does not want an IV. She is requesting MS be given IM. PC to Dr. Laura Cates, orders received to give MS IM. Will continue to monitor patient. > 16 inches

## 2022-08-13 NOTE — CARE COORDINATION
ONGOING DISCHARGE PLANNING NOTE:    Writer reviewed LSW notes, and discharge plan is to return to [facility]. Precert started on 5/32/39.      Electronically signed by Chester Deluna RN on 8/13/2022 at 10:41 AM

## 2022-08-13 NOTE — PLAN OF CARE
Problem: Discharge Planning  Goal: Discharge to home or other facility with appropriate resources  8/13/2022 0148 by Natalia Santacruz RN  Outcome: Progressing  8/12/2022 1609 by Schuyler Ahmadi RN  Outcome: Progressing  Flowsheets (Taken 8/12/2022 1609)  Discharge to home or other facility with appropriate resources: Identify barriers to discharge with patient and caregiver     Problem: Pain  Goal: Verbalizes/displays adequate comfort level or baseline comfort level  8/13/2022 0148 by Natalia Santacruz RN  Outcome: Progressing  Note: Patient with low abdomen pain. Medicated with tylenol with relief of pain. 8/12/2022 1609 by Jacquie Robins RN  Outcome: Progressing  Flowsheets (Taken 8/12/2022 1609)  Verbalizes/displays adequate comfort level or baseline comfort level: Encourage patient to monitor pain and request assistance     Problem: Skin/Tissue Integrity  Goal: Absence of new skin breakdown  Description: 1. Monitor for areas of redness and/or skin breakdown  2. Assess vascular access sites hourly  3. Every 4-6 hours minimum:  Change oxygen saturation probe site  4. Every 4-6 hours:  If on nasal continuous positive airway pressure, respiratory therapy assess nares and determine need for appliance change or resting period. 8/13/2022 0148 by Natalia Santacruz RN  Outcome: Progressing  8/12/2022 1609 by Schuyler Ahmadi RN  Outcome: Progressing     Problem: Safety - Adult  Goal: Free from fall injury  8/13/2022 0148 by Natalia Santacruz RN  Outcome: Progressing  Note: Bed alarm on. Patient weak. Alert and oriented. Able to use call light.   8/12/2022 1609 by Schuyler Ahmadi RN  Outcome: Progressing     Problem: ABCDS Injury Assessment  Goal: Absence of physical injury  8/13/2022 0148 by Natalia Santacruz RN  Outcome: Progressing  8/12/2022 1609 by Jacquie Robins RN  Outcome: Progressing     Problem: Chronic Conditions and Co-morbidities  Goal: Patient's chronic conditions and co-morbidity symptoms are monitored and maintained or improved  8/13/2022 0148 by Theodore Georges RN  Outcome: Progressing  8/12/2022 1609 by Mary Medrano RN  Outcome: Progressing

## 2022-08-13 NOTE — PLAN OF CARE
Please have patient or POA answer online [facility] MRI screening form questions. Exam will be scheduled after form has been completed and reviewed. Order needs to be corrected to read with and without contrast instead of just with. Patient will need an IV in place for this exam.    Please call MRI with any questions ([phone]). Thank you.

## 2022-08-13 NOTE — FLOWSHEET NOTE
We spoke about the gift she had been able to give her children of openly discussing her wishes so they didn't feel they  have to make any decisions; they know exactly what the patient's wishes are. She said she is hannah to have \"good kids\" and a \"loving sister\" who are all helping her in different ways. \"Now we just we just see what the good Max Vilchis has in store. \"   08/13/22 1138   Encounter Summary   Encounter Overview/Reason  Palliative Care   Service Provided For: Patient   Referral/Consult From: [address] [facility];Family members   Last Encounter  08/13/22   Complexity of Encounter Moderate   Spiritual/Emotional needs   Type Spiritual Support   Palliative Care   Type Palliative Care, Follow-up   Assessment/Intervention/Outcome   Assessment Calm   Intervention Active listening;Discussed illness injury and its impact; Discussed relationship with God;Explored/Affirmed feelings, thoughts, concerns;Prayer (assurance of)/[place];Sustaining Presence/Ministry of presence   Outcome Coping;Engaged in conversation;Expressed feelings, needs, and concerns;Expressed Gratitude;Receptive

## 2022-08-13 NOTE — FLOWSHEET NOTE
HANNA garciad, spoke with Dr. Shannan Tam, updated regarding labs and KUB results. Orders received for renal US.

## 2022-08-13 NOTE — PROGRESS NOTES
is chronic, bilateral leg swelling, rectal and vaginal bleeding. Denies fever, chills, chest pain, cough, abdominal pain, nausea, vomiting and diarrhea  There are no aggravating or alleviating factors. Symptoms are reported as acute, constant and moderate severity. Review of Systems:     Constitutional:  negative for chills, fevers, sweats  Respiratory:  negative for cough, dyspnea on exertion, shortness of breath, wheezing  Cardiovascular:  negative for chest pain, chest pressure/discomfort, lower extremity edema, palpitations  Gastrointestinal:  negative for abdominal pain, constipation, diarrhea, nausea, vomiting  Neurological:  negative for dizziness, headache    Medications: Allergies:  No Known Allergies    Current Meds:   Scheduled Meds:    [START ON 8/14/2022] pantoprazole  40 mg Oral QAM AC    insulin lispro  0-8 Units SubCUTAneous TID WC    insulin lispro  0-4 Units SubCUTAneous Nightly    amoxicillin-clavulanate  1 tablet Oral 2 times per day    atorvastatin  10 mg Oral Daily    ferrous sulfate  325 mg Oral BID    gabapentin  300 mg Oral TID    sodium chloride flush  5-40 mL IntraVENous 2 times per day     Continuous Infusions:    sodium chloride      dextrose      sodium chloride       PRN Meds: sodium chloride, glucose, dextrose bolus **OR** dextrose bolus, glucagon (rDNA), dextrose, sodium chloride flush, sodium chloride flush, sodium chloride, ondansetron **OR** ondansetron, acetaminophen **OR** acetaminophen    Data:     Past Medical History:   has a past medical history of Anxiety and depression, Anxiety and depression, Fibroid, Hyperlipidemia, Hypertension, Leiomyomatosis, Lung nodule, Obesity, Osteoarthritis, Radiculopathy, cervical, Renal mass, Restrictive lung disease, Retinal abnormality - diabetes related, and Type II or unspecified type diabetes mellitus without mention of complication, not stated as uncontrolled. Social History:   reports that she has never smoked. She has never used smokeless tobacco. She reports that she does not drink alcohol and does not use drugs. Family History:   Family History   Problem Relation Age of Onset    High Blood Pressure Mother     Diabetes Mother     Heart Disease Mother     Cancer Father        Vitals:  /70   Pulse 93   Temp 98 °F (36.7 °C) (Oral)   Resp 16   Ht 5' 5\" (1.651 m)   Wt 179 lb 7.3 oz (81.4 kg)   SpO2 100%   BMI 29.86 kg/m²   Temp (24hrs), Av.3 °F (36.8 °C), Min:98 °F (36.7 °C), Max:98.6 °F (37 °C)    Recent Labs     22  1144 22  1637 22  0642   POCGLU 147* 135* 174* 103         I/O (24Hr): Intake/Output Summary (Last 24 hours) at 2022 1120  Last data filed at 2022 0858  Gross per 24 hour   Intake 670 ml   Output --   Net 670 ml         Labs:  Hematology:  Recent Labs     22  0459 22  1310 22  0640 22  1452 22  1850   WBC 9.0  --  8.4  --   --    RBC 3.48*  --  3.91*  --   --    HGB 6.8*   < > 8.0* 8.0* 7.9*   HCT 21.7*   < > 25.1* 25.6* 25.6*   MCV 62.4*  --  64.3*  --   --    MCH 19.6*  --  20.5*  --   --    MCHC 31.4  --  31.9  --   --    RDW 25.7*  --  27.1*  --   --      --  386  --   --    MPV 8.1  --  6.3  --   --     < > = values in this interval not displayed.        Chemistry:  Recent Labs     22  04522  0640    139   K 4.2 4.1    107   CO2 27 27   GLUCOSE 105* 125*   BUN 5* 4*   CREATININE 0.47* 0.52   ANIONGAP 4* 5*   LABGLOM >60 >60   GFRAA >60 >60   CALCIUM 8.9 8.9       Recent Labs     22  0558 22  1144 22  1637 22  0642   POCGLU 122* 133* 147* 135* 174* 103       ABG:  Lab Results   Component Value Date/Time    PH 5.0 2022 11:21 AM    PCO2 30 2012 10:14 AM    HCO3 22.8 2012 10:14 AM     No results found for: SPECIAL  No results found for: CULTURE    Radiology:  CT ABDOMEN PELVIS W IV CONTRAST Additional Contrast? Oral    Result Date: 8/10/2022  1. Heterogeneous 13.4 cm lesion in the uterus, concerning for known uterine malignancy. Comparison with any prior outside examination and/or pelvic MRI with IV contrast may be useful further evaluation. 2. Macroscopic fat containing left adnexal lesion, likely a dermoid. 3. There is retroperitoneal, iliac chain, and pelvic lymphadenopathy. 4. Hepatic steatosis. Ill-defined hypoattenuating lesion in the left hepatic lobe is indeterminate. Hepatic protocol MRI or CT is recommended for further evaluation. 5. Innumerable pulmonary nodules with lymphadenopathy in the lower mediastinum, concerning for metastatic disease. 6. 1.8 cm hyperattenuating left renal lesion is indeterminate. Adrenal protocol MRI or CT is recommended for further evaluation. 7. Wall thickening in the distal colon/rectum. This could be seen with underdistention, but colitis would be difficult to exclude. Clinical correlation is recommended. 8. Small ascites. 9. There are few small lucent lesions in the lumbar spine, nonspecific. MRI could provide further information. XR CHEST PORTABLE    Result Date: 8/9/2022  Mild right basilar airspace disease, possibly pneumonia. Innumerable pulmonary nodules measuring up to 2 cm stable to slightly increased from 01/20/2010. Clinical history supplied for the study performed 12 years ago was metastatic lung cancer. Correlate clinically.        Physical Examination:        General appearance:  alert, cooperative and no distress  Mental Status:  oriented to person, place and time and normal affect  Lungs:  clear to auscultation bilaterally, normal effort  Heart:  regular rate and rhythm, no murmur  Abdomen:  soft, nontender, nondistended, normal bowel sounds, no masses, hepatomegaly, splenomegaly  Extremities:  no edema, redness, tenderness in the calves  Skin:  no gross lesions, rashes, induration    Assessment:        Hospital Problems             Last Modified POA    * (Principal) Gastrointestinal hemorrhage 8/9/2022 Yes    Chronic suppurative otitis media of right ear 8/11/2022 Yes    Vaginal bleeding 8/11/2022 Yes    Blood loss anemia 8/11/2022 Yes    Abdominal pain 8/11/2022 Yes    Lower GI bleed 8/12/2022 Yes    Essential hypertension 8/11/2022 Yes    Diabetes mellitus type 2 in obese [facility]) 8/11/2022 Yes    Pelvic mass 8/10/2022 Yes     Plan:        48-year-old female with underlying history of hypertension, hyperlipidemia, diabetes,  Abdominal mass which she was supposed to follow-up with oncology, missed 3 appointments, I discussed at length with oncologist their office sent a letter to the patient,  I had a long discussion with patient and her daughter today, patient did admit that she adamantly miss appointments those were more important, she has been following with the doctors for her eye surgery and exam but did not follow for her abdominal mass which on the CT of the abdomen and pelvis looked like she had extensive mets, no biopsy found in the system,  CAT scan report, radiology mentioned uterine cancer, no biopsy done and pathological reports available at this time,  Admitted on this admission for anemia and possible rectal bleeding, gastroenterology on board, hemoglobin has been staying stable above 7,  No active bleeding at this time,  Chronic anemia possible due to underlying malignancy,  Morbid obesity,  Chronic abdominal pain,  Chronic right ear infection for which we have given her Augmentin at this time seems like she has not seen any ENT physician and ear canal looks extensively inflamed and eroded,    Aug 13  Hemoglobin stable , status post 1 unit of PRBC,  OB/GYN have not seen the patient yet,  Patient will follow with oncology as outpatient,  Waiting for precert  Called by the nurse stat, patient complaining of severe abdominal pain,  Patient has extensive metastatic disease in the abdomen, will do stat labs CBC, CMP, lipase,  Stat KUB,  Pain medications,          Prosper Taylor MD  8/13/2022  11:20 AM     Please note that this chart was generated using voice recognition Dragon dictation software. Although every effort was made to ensure the accuracy of this automated transcription, some errors in transcription may have occurred.

## 2022-08-14 LAB
GLUCOSE BLD-MCNC: 105 MG/DL (ref 65–105)
GLUCOSE BLD-MCNC: 136 MG/DL (ref 65–105)
GLUCOSE BLD-MCNC: 138 MG/DL (ref 65–105)
GLUCOSE BLD-MCNC: 147 MG/DL (ref 65–105)

## 2022-08-14 PROCEDURE — 6370000000 HC RX 637 (ALT 250 FOR IP): Performed by: INTERNAL MEDICINE

## 2022-08-14 PROCEDURE — 2060000000 HC ICU INTERMEDIATE R&B

## 2022-08-14 PROCEDURE — 6370000000 HC RX 637 (ALT 250 FOR IP): Performed by: NURSE PRACTITIONER

## 2022-08-14 PROCEDURE — 99232 SBSQ HOSP IP/OBS MODERATE 35: CPT | Performed by: INTERNAL MEDICINE

## 2022-08-14 PROCEDURE — 99231 SBSQ HOSP IP/OBS SF/LOW 25: CPT | Performed by: INTERNAL MEDICINE

## 2022-08-14 PROCEDURE — 82947 ASSAY GLUCOSE BLOOD QUANT: CPT

## 2022-08-14 RX ADMIN — GABAPENTIN 300 MG: 300 CAPSULE ORAL at 21:26

## 2022-08-14 RX ADMIN — AMOXICILLIN AND CLAVULANATE POTASSIUM 1 TABLET: 875; 125 TABLET, FILM COATED ORAL at 09:43

## 2022-08-14 RX ADMIN — FERROUS SULFATE TAB 325 MG (65 MG ELEMENTAL FE) 325 MG: 325 (65 FE) TAB at 09:43

## 2022-08-14 RX ADMIN — FERROUS SULFATE TAB 325 MG (65 MG ELEMENTAL FE) 325 MG: 325 (65 FE) TAB at 21:26

## 2022-08-14 RX ADMIN — GABAPENTIN 300 MG: 300 CAPSULE ORAL at 09:43

## 2022-08-14 RX ADMIN — ACETAMINOPHEN 650 MG: 325 TABLET, FILM COATED ORAL at 04:37

## 2022-08-14 RX ADMIN — ATORVASTATIN CALCIUM 10 MG: 10 TABLET, FILM COATED ORAL at 09:43

## 2022-08-14 RX ADMIN — GABAPENTIN 300 MG: 300 CAPSULE ORAL at 13:46

## 2022-08-14 RX ADMIN — PANTOPRAZOLE SODIUM 40 MG: 40 TABLET, DELAYED RELEASE ORAL at 09:43

## 2022-08-14 RX ADMIN — AMOXICILLIN AND CLAVULANATE POTASSIUM 1 TABLET: 875; 125 TABLET, FILM COATED ORAL at 21:26

## 2022-08-14 ASSESSMENT — PAIN DESCRIPTION - ORIENTATION: ORIENTATION: LOWER

## 2022-08-14 ASSESSMENT — PAIN SCALES - GENERAL: PAINLEVEL_OUTOF10: 10

## 2022-08-14 ASSESSMENT — PAIN DESCRIPTION - LOCATION: LOCATION: BACK

## 2022-08-14 NOTE — FLOWSHEET NOTE
08/14/22 1050   Encounter Summary   Encounter Overview/Reason  Palliative Care   Service Provided For: Patient   Referral/Consult From: Palliative Care   Last Encounter  08/14/22   Complexity of Encounter Moderate   Spiritual/Emotional needs   Type Spiritual Support   Palliative Care   Type Palliative Care, Follow-up   Assessment/Intervention/Outcome   Assessment Calm   Intervention Active listening;Prayer (assurance of)/[place];Sustaining Presence/Ministry of presence   Outcome Coping;Engaged in conversation;Receptive

## 2022-08-14 NOTE — PROGRESS NOTES
[address] Internal Medicine  Geeta Florentino MD; Dora Kim MD; Mima Patterson MD; MD Lisandro Perez MD; MD J[facility]   [facility]     Progress Note    8/14/2022    11:33 AM    Name:   Armando Cherry  MRN:     [id number]     Acct:      [de-identified]   Room:   2092/2092-01  IP Day:  5  Admit Date:  8/9/2022  6:43 PM    PCP:   Karoline Dakin, MD  Code Status:  Full Code    Subjective:     C/C:   Chief Complaint   Patient presents with    GI Problem     ECF staff notice bright red blood in stool this PM.  Patient's lab indicated low hemoglobin yesterday. Interval History Status: not changed. Seen and examined   Hb dropped   Vaginal bleeding   OB/GYN consulted       Brief History:     Anna Marie Maciel is a 67 y.o. Non- / non  female who presents with GI Problem (ECF staff notice bright red blood in stool this PM.  Patient's lab indicated low hemoglobin yesterday.)   and is admitted to the hospital for the management of Gastrointestinal hemorrhage. Past medical history includes pelvic mass, DMT2, HTN,and HLD. Patient is able to provide very little contributing information. She was apparently sent to ED today after ECF staff noticing bright red rectal bleeding. She has a history of a similar admission to [facility] with vaginal and rectal bleeding but records from that hospitalization are not available currently. Patient has a history of a pelvic mass, adnexal mass and bilateral lung masses that are concerning for metastatic cancer. She missed several outpatient appointments with Dr. Stacey Graham and there is no current work up or treatment plan documented. She had IVC filter placed on 7/26/22 due to history of bilateral PE and contraindication to anticoagulation. In ED, Hgb 7.2. Troponin 21 with repeat 19. EKG normal sinus without ST changes.    Symptoms are associated with shortness of breath which is chronic, bilateral leg swelling, rectal and vaginal bleeding. Denies fever, chills, chest pain, cough, abdominal pain, nausea, vomiting and diarrhea  There are no aggravating or alleviating factors. Symptoms are reported as acute, constant and moderate severity. Review of Systems:     Constitutional:  negative for chills, fevers, sweats  Respiratory:  negative for cough, dyspnea on exertion, shortness of breath, wheezing  Cardiovascular:  negative for chest pain, chest pressure/discomfort, lower extremity edema, palpitations  Gastrointestinal:  negative for abdominal pain, constipation, diarrhea, nausea, vomiting  Neurological:  negative for dizziness, headache    Medications:      Allergies:  No Known Allergies    Current Meds:   Scheduled Meds:    pantoprazole  40 mg Oral QAM AC    insulin lispro  0-8 Units SubCUTAneous TID WC    insulin lispro  0-4 Units SubCUTAneous Nightly    amoxicillin-clavulanate  1 tablet Oral 2 times per day    atorvastatin  10 mg Oral Daily    ferrous sulfate  325 mg Oral BID    gabapentin  300 mg Oral TID    sodium chloride flush  5-40 mL IntraVENous 2 times per day     Continuous Infusions:    sodium chloride      dextrose      sodium chloride       PRN Meds: HYDROcodone 5 mg - acetaminophen, morphine, sodium chloride, glucose, dextrose bolus **OR** dextrose bolus, glucagon (rDNA), dextrose, sodium chloride flush, sodium chloride flush, sodium chloride, ondansetron **OR** ondansetron, acetaminophen **OR** acetaminophen    Data:     Past Medical History:   has a past medical history of Anxiety and depression, Anxiety and depression, Endometrial cancer ([facility] [address] Fibroid, Hyperlipidemia, Hypertension, Leiomyomatosis, Lung nodule, Obesity, Osteoarthritis, Radiculopathy, cervical, Renal mass, Restrictive lung disease, Retinal abnormality - diabetes related, and Type II or unspecified type diabetes mellitus without mention of complication, not stated as uncontrolled. Social History:   reports that she has never smoked. She has never used smokeless tobacco. She reports that she does not drink alcohol and does not use drugs. Family History:   Family History   Problem Relation Age of Onset    High Blood Pressure Mother     Diabetes Mother     Heart Disease Mother     Cancer Father        Vitals:  BP (!) 144/65   Pulse 94   Temp 98.3 °F (36.8 °C) (Axillary)   Resp 18   Ht 5' 5\" (1.651 m)   Wt 181 lb (82.1 kg)   SpO2 99%   BMI 30.12 kg/m²   Temp (24hrs), Av.4 °F (36.9 °C), Min:98.3 °F (36.8 °C), Max:98.6 °F (37 °C)    Recent Labs     22  1617 22  1940 22  0605 22  1100   POCGLU 141* 155* 105 138*         I/O (24Hr):     Intake/Output Summary (Last 24 hours) at 2022 1133  Last data filed at 2022  Gross per 24 hour   Intake 300 ml   Output --   Net 300 ml         Labs:  Hematology:  Recent Labs     22  0640 22  1452 22  1850 22  1256   WBC 8.4  --   --  11.5*   RBC 3.91*  --   --  4.32   HGB 8.0* 8.0* 7.9* 8.5*   HCT 25.1* 25.6* 25.6* 28.4*   MCV 64.3*  --   --  65.9*   MCH 20.5*  --   --  19.7*   MCHC 31.9  --   --  29.9*   RDW 27.1*  --   --  28.3*     --   --  377   MPV 6.3  --   --  8.1       Chemistry:  Recent Labs     22  0640 22  1256    135   K 4.1 4.2    101   CO2 27 25   GLUCOSE 125* 109*   BUN 4* 6*   CREATININE 0.52 0.48*   ANIONGAP 5* 9   LABGLOM >60 >60   GFRAA >60 >60   CALCIUM 8.9 9.5       Recent Labs     22  0642 22  1129 22  1256 22  1617 22  1940 22  0605 22  1100   PROT  --   --  7.4  --   --   --   --    LABALBU  --   --  3.5  --   --   --   --    AST  --   --  11  --   --   --   --    ALT  --   --  5  --   --   --   --    ALKPHOS  --   --  58  --   --   --   --    BILITOT  --   --  0.44  --   --   --   --    LIPASE  --   --  31  --   --   --   --    POCGLU 103 111*  --  141* 155* 105 138* ABG:  Lab Results   Component Value Date/Time    PH 5.0 07/24/2022 11:21 AM    PCO2 30 06/26/2012 10:14 AM    HCO3 22.8 06/26/2012 10:14 AM     No results found for: SPECIAL  No results found for: CULTURE    Radiology:  CT ABDOMEN PELVIS W IV CONTRAST Additional Contrast? Oral    Result Date: 8/10/2022  1. Heterogeneous 13.4 cm lesion in the uterus, concerning for known uterine malignancy. Comparison with any prior outside examination and/or pelvic MRI with IV contrast may be useful further evaluation. 2. Macroscopic fat containing left adnexal lesion, likely a dermoid. 3. There is retroperitoneal, iliac chain, and pelvic lymphadenopathy. 4. Hepatic steatosis. Ill-defined hypoattenuating lesion in the left hepatic lobe is indeterminate. Hepatic protocol MRI or CT is recommended for further evaluation. 5. Innumerable pulmonary nodules with lymphadenopathy in the lower mediastinum, concerning for metastatic disease. 6. 1.8 cm hyperattenuating left renal lesion is indeterminate. Adrenal protocol MRI or CT is recommended for further evaluation. 7. Wall thickening in the distal colon/rectum. This could be seen with underdistention, but colitis would be difficult to exclude. Clinical correlation is recommended. 8. Small ascites. 9. There are few small lucent lesions in the lumbar spine, nonspecific. MRI could provide further information. XR CHEST PORTABLE    Result Date: 8/9/2022  Mild right basilar airspace disease, possibly pneumonia. Innumerable pulmonary nodules measuring up to 2 cm stable to slightly increased from 01/20/2010. Clinical history supplied for the study performed 12 years ago was metastatic lung cancer. Correlate clinically.        Physical Examination:        General appearance:  alert, cooperative and no distress  Mental Status:  oriented to person, place and time and normal affect  Lungs:  clear to auscultation bilaterally, normal effort  Heart:  regular rate and rhythm, no murmur  Abdomen:  soft, nontender, nondistended, normal bowel sounds, no masses, hepatomegaly, splenomegaly  Extremities:  no edema, redness, tenderness in the calves  Skin:  no gross lesions, rashes, induration    Assessment:        Hospital Problems             Last Modified POA    * (Principal) Gastrointestinal hemorrhage 8/9/2022 Yes    Chronic suppurative otitis media of right ear 8/11/2022 Yes    Vaginal bleeding 8/11/2022 Yes    Blood loss anemia 8/11/2022 Yes    Abdominal pain 8/11/2022 Yes    Lower GI bleed 8/12/2022 Yes    Endometrial cancer ([facility] [address] 8/13/2022 Yes    Essential hypertension 8/11/2022 Yes    Diabetes mellitus type 2 in obese ([address] 8/11/2022 Yes    Pelvic mass 8/10/2022 Yes   Plan:        25-year-old female with underlying history of hypertension, hyperlipidemia, diabetes,  Abdominal mass which she was supposed to follow-up with oncology, missed 3 appointments, I discussed at length with oncologist their office sent a letter to the patient,  I had a long discussion with patient and her daughter today, patient did admit that she adamantly miss appointments those were more important, she has been following with the doctors for her eye surgery and exam but did not follow for her abdominal mass which on the CT of the abdomen and pelvis looked like she had extensive mets, no biopsy found in the system,  CAT scan report, radiology mentioned uterine cancer, no biopsy done and pathological reports available at this time,  Admitted on this admission for anemia and possible rectal bleeding, gastroenterology on board, hemoglobin has been staying stable above 7,  No active bleeding at this time,  Chronic anemia possible due to underlying malignancy,  Morbid obesity,  Chronic abdominal pain,  Chronic right ear infection for which we have given her Augmentin at this time seems like she has not seen any ENT physician and ear canal looks extensively inflamed and eroded,    Aug 14  Hemoglobin stable , status post 1 unit of PRBC,  OB/GYN have not seen the patient yet,  Patient will follow with oncology as outpatient,  Waiting for precert  Abdominal pain all better today,  Eating well,  Patient refused labs yesterday,            Jaron Taylor MD  8/14/2022  11:33 AM     Please note that this chart was generated using voice recognition Dragon dictation software. Although every effort was made to ensure the accuracy of this automated transcription, some errors in transcription may have occurred.

## 2022-08-14 NOTE — FLOWSHEET NOTE
08/13/22 2350   NIHSS Stroke Scale   NIHSS Stroke Scale Assessed (S)  Yes     Telesitter called RN to patient's room for patient report of difficulty breathing. Vital signs taken and patient assessed. Patient verbalizes feeling numbness in left hand and arm, Second RN called to bedside for dual RN stroke assessment. NIHSS: 1 at this time; patient stroke assessment negative, besides patient not moving eyes side-to-side to follow finger when asked. Patient able to adjust vision and eyes follow outside of direction with stroke scale. Patient verbalizes that pain/tingling moved to both left and right arms; Patient verbalizes that arms are starting to feel better while rubbing them up and down. Electronically signed by Catherine Seymour RN.

## 2022-08-14 NOTE — PROGRESS NOTES
Today's Date: 8/13/2022  Patient Name: Anna Marie Villanueva  Date of admission: 8/9/2022  6:43 PM  Patient's age: 67 y.o., 1950  Admission Dx: Gastrointestinal hemorrhage [K92.2]  Lower GI bleed [K92.2]    Reason for Consult: management recommendations  Requesting Physician: Andre Beltre MD    CHIEF COMPLAINT: GI bleed    History Obtained From:  patient    Interval history  Vaginal bleed is slowing down  Patient is slightly confused and unable to give me information. I see the notes from GYN but there is no specific plan about work-up/management for her bleeding  Pelvic ultrasound showed evidence of multiple fibroids but malignancy cannot be excluded slightly  pression   Within the central uterus, there is a large 12.7 cm mass that has the   appearance of a fibroid. Along the left anterior uterus, there is a 7.3 cm   hyperechoic lesion which may represent a lipoleiomyoma based on the CT   appearance. Inferior to this, there is another probable fibroid measuring up   to 4.7 cm. Malignancy cannot be excluded on the basis of this examination. Consider contrast-enhanced pelvic MRI for further evaluation. HISTORY OF PRESENT ILLNESS:      The patient is a 67 y.o.   female who is admitted to the hospital for GI bleed the staff noticed bright red blood in the stool, similar admission to the hospital at [facility] with vaginal and rectal bleed work-up was done is unavailable at this time, patient had pelvic mass adnexal mass and bilateral lung masses concerning for metastatic endometrial cancer she missed several appointment with Dr. Radha Wilcox also she does have IVC filter after she had bilateral pulmonary embolism started on anticoagulation complicated by GI bleed, she did have abdominal pain no fever or chills or weight loss or nausea or vomiting  CT abdomen on July 24 did show extensive retroperitoneal adenopathy encasement of the aorta and multiple pelvic vein noted no bowel obstruction    Past Medical History:   has a past medical history of Anxiety and depression, Anxiety and depression, Fibroid, Hyperlipidemia, Hypertension, Leiomyomatosis, Lung nodule, Obesity, Osteoarthritis, Radiculopathy, cervical, Renal mass, Restrictive lung disease, Retinal abnormality - diabetes related, and Type II or unspecified type diabetes mellitus without mention of complication, not stated as uncontrolled. Past Surgical History:   has a past surgical history that includes Lung biopsy; Cardiac catheterization; and  section. Medications:    Reviewed in Epic     Allergies:  Patient has no known allergies. Social History:   reports that she has never smoked. She has never used smokeless tobacco. She reports that she does not drink alcohol and does not use drugs. Family History: family history includes Cancer in her father; Diabetes in her mother; Heart Disease in her mother; High Blood Pressure in her mother. REVIEW OF SYSTEMS:    Constitutional: No fever or chills. No night sweats, no weight loss   Eyes: No eye discharge, double vision, or eye pain   HEENT: negative for sore mouth, sore throat, hoarseness and voice change   Respiratory: negative for cough , sputum, dyspnea, wheezing, hemoptysis, chest pain   Cardiovascular: negative for chest pain, dyspnea, palpitations, orthopnea, PND   Gastrointestinal: negative for nausea, vomiting, diarrhea, constipation, positive for abdominal pain, no dysphagia, hematemesis positive for hematochezia   Genitourinary: negative for frequency, dysuria, nocturia, urinary incontinence, and hematuria   Integument: negative for rash, skin lesions, bruises.    Hematologic/Lymphatic: negative for easy bruising, bleeding, lymphadenopathy, or petechiae   Endocrine: negative for heat or cold intolerance,weight changes, change in bowel habits and hair loss   Musculoskeletal: negative for myalgias, arthralgias, pain, joint swelling,and bone pain   Neurological: negative for headaches, dizziness, seizures, weakness, numbness    PHYSICAL EXAM:      BP (!) 151/66   Pulse 95   Temp 98.3 °F (36.8 °C) (Axillary)   Resp 22   Ht 5' 5\" (1.651 m)   Wt 179 lb 7.3 oz (81.4 kg)   SpO2 100%   BMI 29.86 kg/m²    Temp (24hrs), Av.1 °F (36.7 °C), Min:97.5 °F (36.4 °C), Max:98.4 °F (36.9 °C)    General appearance -slightly confused  Eyes - pupils equal and reactive, extraocular eye movements intact   Ears - bilateral TM's and external ear canals normal   Mouth - mucous membranes moist, pharynx normal without lesions   Neck - supple, no significant adenopathy   Lymphatics - no palpable lymphadenopathy, no hepatosplenomegaly   Chest - clear to auscultation, no wheezes, rales or rhonchi, symmetric air entry   Heart - normal rate, regular rhythm, normal S1, S2, no murmurs  Abdomen - soft, tender, nondistended, pelvic mass palpable and tender  Neurological - alert, oriented, normal speech, no focal findings or movement disorder noted   Musculoskeletal - no joint tenderness, deformity or swelling   Extremities - peripheral pulses normal, no pedal edema, no clubbing or cyanosis   Skin - normal coloration and turgor, no rashes, no suspicious skin lesions noted ,    DATA:    Labs:   CBC:   Recent Labs     22  0640 22  1452 22  1850 22  1256   WBC 8.4  --   --  11.5*   HGB 8.0*   < > 7.9* 8.5*   HCT 25.1*   < > 25.6* 28.4*     --   --  377    < > = values in this interval not displayed. BMP:   Recent Labs     22  0640 22  1256    135   K 4.1 4.2   CO2 27 25   BUN 4* 6*   CREATININE 0.52 0.48*   LABGLOM >60 >60   GLUCOSE 125* 109*     PT/INR:   No results for input(s): PROTIME, INR in the last 72 hours. IMAGING DATA:  XR ABDOMEN (KUB) (SINGLE AP VIEW)   Final Result   Appearance favors ileus. US PELVIS COMPLETE   Final Result   Within the central uterus, there is a large 12.7 cm mass that has the   appearance of a fibroid.   Along the left anterior uterus, there is a 7.3 cm   hyperechoic lesion which may represent a lipoleiomyoma based on the CT   appearance. Inferior to this, there is another probable fibroid measuring up   to 4.7 cm. Malignancy cannot be excluded on the basis of this examination. Consider contrast-enhanced pelvic MRI for further evaluation. CT ABDOMEN PELVIS W IV CONTRAST Additional Contrast? Oral   Final Result   1. Heterogeneous 13.4 cm lesion in the uterus, concerning for known uterine   malignancy. Comparison with any prior outside examination and/or pelvic MRI   with IV contrast may be useful further evaluation. 2. Macroscopic fat containing left adnexal lesion, likely a dermoid. 3. There is retroperitoneal, iliac chain, and pelvic lymphadenopathy. 4. Hepatic steatosis. Ill-defined hypoattenuating lesion in the left hepatic   lobe is indeterminate. Hepatic protocol MRI or CT is recommended for further   evaluation. 5. Innumerable pulmonary nodules with lymphadenopathy in the lower   mediastinum, concerning for metastatic disease. 6. 1.8 cm hyperattenuating left renal lesion is indeterminate. Adrenal   protocol MRI or CT is recommended for further evaluation. 7. Wall thickening in the distal colon/rectum. This could be seen with   underdistention, but colitis would be difficult to exclude. Clinical   correlation is recommended. 8. Small ascites. 9. There are few small lucent lesions in the lumbar spine, nonspecific. MRI   could provide further information. XR CHEST PORTABLE   Final Result   Mild right basilar airspace disease, possibly pneumonia. Innumerable pulmonary nodules measuring up to 2 cm stable to slightly   increased from 01/20/2010. Clinical history supplied for the study performed   12 years ago was metastatic lung cancer. Correlate clinically.          US RETROPERITONEAL COMPLETE    (Results Pending)   MRI PELVIS W WO CONTRAST    (Results Pending)       Primary Problem  Gastrointestinal hemorrhage    Active Hospital Problems    Diagnosis Date Noted    Lower GI bleed [K92.2] 08/12/2022     Priority: Medium    Chronic suppurative otitis media of right ear [H66.3X1] 08/11/2022     Priority: Medium    Vaginal bleeding [N93.9] 08/11/2022     Priority: Medium    Blood loss anemia [D50.0] 08/11/2022     Priority: Medium    Abdominal pain [R10.9] 08/11/2022     Priority: Medium    Gastrointestinal hemorrhage [K92.2] 08/09/2022     Priority: Medium    Pelvic mass [R19.00] 12/01/2016    Essential hypertension [I10] 09/21/2012    Diabetes mellitus type 2 in obese ([facility] [address] [E11.69, E66.9] 09/21/2012         IMPRESSION:   Pelvic mass/abdominal lymphadenopathy  Abdominal pain related to above  Anemia  Vaginal bleed, likely related to progressive endometrial cancer    RECOMMENDATIONS:  I reviewed the laboratory data, imaging studies, discussed the diagnosis and possible treatment    Looking at the CT scan, the patient has progressive metastatic cancer. It seems that she had missed multiple appointments to the oncologist and her cancer has not been addressed. We will ask GYN to come back and evaluate the patient after the pelvic ultrasound. However, seems that the bleeding is on slowing and hemoglobin is stable  Monitor H&H and keep hemoglobin above 7  I am not sure of any further inpatient work-up is necessary. Patient has diagnosed malignancy and needs to follow-up with medical oncology or GYN oncology. She has missed multiple appointments previously and we would like her to come back and get evaluated. If she is not interested in any therapy, then we will need to involve palliative care    Discussed with patient and Nurse. Thank you for asking us to see this patient.        Lucie Kiser MD  Hematologist/Medical [id number] [facility] hematology oncology physicians                                  This note is created with the assistance of a speech recognition program.  While intending to generate a document that actually reflects the content of the visit, the document can still have some errors including those of syntax and sound a like substitutions which may escape proof reading. It such instances, actual meaning can be extrapolated by contextual diversion.      Hematologist/Medical Oncologist

## 2022-08-14 NOTE — PROGRESS NOTES
Telesitter called RN to check on patient after patient verbalized to telesitter that she feels dizzy. This RN went in to assess patient; Patient wobbling in circles while sitting up in bed stating \"wow, I don't know what's happening\". Patient attempted to swing legs over side of bed with writer in room, Patient assisted back to bed and repositioned. Patient alert and oriented to person, place and year. Patient disoriented to day/time and situation. Telesitter and bed alarm still in place and rounding frequency to be increased. Electronically signed by Robbie Glover RN.

## 2022-08-14 NOTE — PLAN OF CARE
Problem: Pain  Goal: Verbalizes/displays adequate comfort level or baseline comfort level  8/14/2022 0413 by Amy Serna RN  Outcome: Not Progressing  Flowsheets (Taken 8/14/2022 0413)  Verbalizes/displays adequate comfort level or baseline comfort level:   Encourage patient to monitor pain and request assistance   Assess pain using appropriate pain scale   Implement non-pharmacological measures as appropriate and evaluate response   Administer analgesics based on type and severity of pain and evaluate response  Note: Patient requested PRN pain medication with evening meds this shift but denies feeling pain. This RN educated patient on using a pain scale correctly and when pain medications are indicated as well as offered some non-pharmacological relaxation techniques. Problem: Discharge Planning  Goal: Discharge to home or other facility with appropriate resources  8/14/2022 0413 by Amy Serna RN  Outcome: Progressing  Flowsheets (Taken 8/14/2022 0413)  Discharge to home or other facility with appropriate resources:   Identify barriers to discharge with patient and caregiver   Arrange for needed discharge resources and transportation as appropriate   Refer to discharge planning if patient needs post-hospital services based on physician order or complex needs related to functional status, cognitive ability or social support system   Identify discharge learning needs (meds, wound care, etc)  Note: Patient intermittently confused during this shift, but easily reoriented. Patient to discharge back to [facility] once medically cleared, precert started 9/83. Problem: Skin/Tissue Integrity  Goal: Absence of new skin breakdown  Description: 1. Monitor for areas of redness and/or skin breakdown  2. Assess vascular access sites hourly  3. Every 4-6 hours minimum:  Change oxygen saturation probe site  4.   Every 4-6 hours:  If on nasal continuous positive airway pressure, respiratory therapy assess nares and determine need for appliance change or resting period. 8/14/2022 0413 by El Chicas RN  Outcome: Progressing  Note: Patient shows no new signs of skin breakdown during this shift. Patient calls out appropriately with call light for assistance with ambulation to restroom and is able to make elimination needs known, avoiding accidents. Skin has remained clean, dry and intact throughout this shift. Problem: Safety - Adult  Goal: Free from fall injury  8/14/2022 0413 by El Chicas RN  Outcome: Progressing  Flowsheets (Taken 8/14/2022 0413)  Free From Fall Injury:   Instruct family/caregiver on patient safety   Based on caregiver fall risk screen, instruct family/caregiver to ask for assistance with transferring infant if caregiver noted to have fall risk factors  Note: Patient remains free from falls during this shift. Bed in lowest position, side rails up x2, call light/personal belongings/side table within reach, bed alarm active and telesitter in room. Patient calls out appropriately with call light for assistance with ambulation.

## 2022-08-14 NOTE — PLAN OF CARE
Problem: Discharge Planning  Goal: Discharge to home or other facility with appropriate resources  8/14/2022 1556 by Frank Borjas RN  Outcome: Progressing  8/14/2022 0413 by Amy Serna RN  Outcome: Progressing  Flowsheets (Taken 8/14/2022 0413)  Discharge to home or other facility with appropriate resources:   Identify barriers to discharge with patient and caregiver   Arrange for needed discharge resources and transportation as appropriate   Refer to discharge planning if patient needs post-hospital services based on physician order or complex needs related to functional status, cognitive ability or social support system   Identify discharge learning needs (meds, wound care, etc)  Note: Patient intermittently confused during this shift, but easily reoriented. Patient to discharge back to [facility] once medically cleared, precert started 0/13. Problem: Pain  Goal: Verbalizes/displays adequate comfort level or baseline comfort level  8/14/2022 1556 by Frank Borjas RN  Outcome: Progressing  8/14/2022 0413 by Amy Serna RN  Outcome: Not Progressing  Flowsheets (Taken 8/14/2022 0413)  Verbalizes/displays adequate comfort level or baseline comfort level:   Encourage patient to monitor pain and request assistance   Assess pain using appropriate pain scale   Implement non-pharmacological measures as appropriate and evaluate response   Administer analgesics based on type and severity of pain and evaluate response  Note: Patient requested PRN pain medication with evening meds this shift but denies feeling pain. This RN educated patient on using a pain scale correctly and when pain medications are indicated as well as offered some non-pharmacological relaxation techniques. Problem: Skin/Tissue Integrity  Goal: Absence of new skin breakdown  Description: 1. Monitor for areas of redness and/or skin breakdown  2. Assess vascular access sites hourly  3.   Every 4-6 hours minimum:  Change oxygen saturation probe site  4. Every 4-6 hours:  If on nasal continuous positive airway pressure, respiratory therapy assess nares and determine need for appliance change or resting period. 8/14/2022 1556 by Herbert Patton RN  Outcome: Progressing  8/14/2022 0413 by Yoli Priest RN  Outcome: Progressing  Note: Patient shows no new signs of skin breakdown during this shift. Patient calls out appropriately with call light for assistance with ambulation to restroom and is able to make elimination needs known, avoiding accidents. Skin has remained clean, dry and intact throughout this shift. Problem: Safety - Adult  Goal: Free from fall injury  8/14/2022 1556 by Herbert Patton RN  Outcome: Progressing  8/14/2022 0413 by Yoli Priest RN  Outcome: Progressing  Flowsheets (Taken 8/14/2022 0413)  Free From Fall Injury:   Instruct family/caregiver on patient safety   Based on caregiver fall risk screen, instruct family/caregiver to ask for assistance with transferring infant if caregiver noted to have fall risk factors  Note: Patient remains free from falls during this shift. Bed in lowest position, side rails up x2, call light/personal belongings/side table within reach, bed alarm active and telesitter in room. Patient calls out appropriately with call light for assistance with ambulation.        Problem: ABCDS Injury Assessment  Goal: Absence of physical injury  Outcome: Progressing     Problem: Chronic Conditions and Co-morbidities  Goal: Patient's chronic conditions and co-morbidity symptoms are monitored and maintained or improved  Outcome: Progressing     Problem: Pain  Goal: Verbalizes/displays adequate comfort level or baseline comfort level  8/14/2022 1556 by Herbert Patton RN  Outcome: Progressing  8/14/2022 0413 by Yoli Priest RN  Outcome: Not Progressing  Flowsheets (Taken 8/14/2022 0413)  Verbalizes/displays adequate comfort level or baseline comfort level:   Encourage patient to monitor pain and request assistance   Assess pain using appropriate pain scale   Implement non-pharmacological measures as appropriate and evaluate response   Administer analgesics based on type and severity of pain and evaluate response  Note: Patient requested PRN pain medication with evening meds this shift but denies feeling pain. This RN educated patient on using a pain scale correctly and when pain medications are indicated as well as offered some non-pharmacological relaxation techniques.

## 2022-08-14 NOTE — CARE COORDINATION
SÁNCHEZ spoke with Celia Cordova at [facility] to discuss pt pre-cert. Apolonia informed SÁNCHEZ that pre-cert is still pending at this time. SÁNCHEZ will continue to follow for discharge planning.

## 2022-08-14 NOTE — PROGRESS NOTES
Today's Date: 8/14/2022  Patient Name: Anna Marie Villanueva  Date of admission: 8/9/2022  6:43 PM  Patient's age: 67 y.o., 1950  Admission Dx: Gastrointestinal hemorrhage [K92.2]  Lower GI bleed [K92.2]    Reason for Consult: management recommendations  Requesting Physician: Andre Beltre MD    CHIEF COMPLAINT: GI bleed    History Obtained From:  patient    Interval history  The patient is seen and evaluated. The bleeding has improved/subsided. The patient admits that she has missed multiple appointments with oncologist.  Most of her care has happened at the [address] [place] [address]. Most records are not available to us but she had also multiple appointments with Dr. Radha Wilcox and she missed those appointments. Now she states that she will do what ever it takes to fight the cancer. However, she does not want a follow-up with [facility] oncology and wants to see the doctors at [address] [place] [address] as they are closer to home. Then I found out that the patient will be placed at [facility] so she might be far from [address] [place] [address]. HISTORY OF PRESENT ILLNESS:      The patient is a 67 y.o.   female who is admitted to the hospital for GI bleed the staff noticed bright red blood in the stool, similar admission to the hospital at [facility] with vaginal and rectal bleed work-up was done is unavailable at this time, patient had pelvic mass adnexal mass and bilateral lung masses concerning for metastatic endometrial cancer she missed several appointment with Dr. Radha Wilcox also she does have IVC filter after she had bilateral pulmonary embolism started on anticoagulation complicated by GI bleed, she did have abdominal pain no fever or chills or weight loss or nausea or vomiting  CT abdomen on July 24 did show extensive retroperitoneal adenopathy encasement of the aorta and multiple pelvic vein noted no bowel obstruction    Past Medical History:   has a past medical history of Anxiety and depression, Anxiety and depression, Endometrial cancer ([facility] [address] Fibroid, Hyperlipidemia, Hypertension, Leiomyomatosis, Lung nodule, Obesity, Osteoarthritis, Radiculopathy, cervical, Renal mass, Restrictive lung disease, Retinal abnormality - diabetes related, and Type II or unspecified type diabetes mellitus without mention of complication, not stated as uncontrolled. Past Surgical History:   has a past surgical history that includes Lung biopsy; Cardiac catheterization; and  section. Medications:    Reviewed in Epic     Allergies:  Patient has no known allergies. Social History:   reports that she has never smoked. She has never used smokeless tobacco. She reports that she does not drink alcohol and does not use drugs. Family History: family history includes Cancer in her father; Diabetes in her mother; Heart Disease in her mother; High Blood Pressure in her mother. REVIEW OF SYSTEMS:    Constitutional: No fever or chills. No night sweats, no weight loss   Eyes: No eye discharge, double vision, or eye pain   HEENT: negative for sore mouth, sore throat, hoarseness and voice change   Respiratory: negative for cough , sputum, dyspnea, wheezing, hemoptysis, chest pain   Cardiovascular: negative for chest pain, dyspnea, palpitations, orthopnea, PND   Gastrointestinal: negative for nausea, vomiting, diarrhea, constipation, positive for abdominal pain, no dysphagia, hematemesis positive for hematochezia   Genitourinary: negative for frequency, dysuria, nocturia, urinary incontinence, and hematuria   Integument: negative for rash, skin lesions, bruises.    Hematologic/Lymphatic: negative for easy bruising, bleeding, lymphadenopathy, or petechiae   Endocrine: negative for heat or cold intolerance,weight changes, change in bowel habits and hair loss   Musculoskeletal: negative for myalgias, arthralgias, pain, joint swelling,and bone pain   Neurological: negative for headaches, dizziness, seizures, weakness, numbness    PHYSICAL EXAM:      /65   Pulse (!) 103   Temp 97.3 °F (36.3 °C)   Resp 18   Ht 5' 5\" (1.651 m)   Wt 181 lb (82.1 kg)   SpO2 100%   BMI 30.12 kg/m²    Temp (24hrs), Av.2 °F (36.8 °C), Min:97.3 °F (36.3 °C), Max:98.6 °F (37 °C)    General appearance -awake and oriented  Eyes - pupils equal and reactive, extraocular eye movements intact   Ears -large mass coming out of her right ear. Not sure what kind of masses there is and she states that she had it for years  Mouth - mucous membranes moist, pharynx normal without lesions   Neck - supple, no significant adenopathy   Lymphatics - no palpable lymphadenopathy, no hepatosplenomegaly   Chest - clear to auscultation, no wheezes, rales or rhonchi, symmetric air entry   Heart - normal rate, regular rhythm, normal S1, S2, no murmurs  Abdomen - soft, tender, nondistended, pelvic mass palpable and tender  Neurological - alert, oriented, normal speech, no focal findings or movement disorder noted   Musculoskeletal - no joint tenderness, deformity or swelling   Extremities - peripheral pulses normal, no pedal edema, no clubbing or cyanosis   Skin - normal coloration and turgor, no rashes, no suspicious skin lesions noted ,    DATA:    Labs:   CBC:   Recent Labs     22  0640 22  1452 22  1850 22  1256   WBC 8.4  --   --  11.5*   HGB 8.0*   < > 7.9* 8.5*   HCT 25.1*   < > 25.6* 28.4*     --   --  377    < > = values in this interval not displayed. BMP:   Recent Labs     22  0640 22  1256    135   K 4.1 4.2   CO2 27 25   BUN 4* 6*   CREATININE 0.52 0.48*   LABGLOM >60 >60   GLUCOSE 125* 109*     PT/INR:   No results for input(s): PROTIME, INR in the last 72 hours. IMAGING DATA:  XR ABDOMEN (KUB) (SINGLE AP VIEW)   Final Result   Appearance favors ileus. US PELVIS COMPLETE   Final Result   Within the central uterus, there is a large 12.7 cm mass that has the   appearance of a fibroid. Along the left anterior uterus, there is a 7.3 cm   hyperechoic lesion which may represent a lipoleiomyoma based on the CT   appearance. Inferior to this, there is another probable fibroid measuring up   to 4.7 cm. Malignancy cannot be excluded on the basis of this examination. Consider contrast-enhanced pelvic MRI for further evaluation. CT ABDOMEN PELVIS W IV CONTRAST Additional Contrast? Oral   Final Result   1. Heterogeneous 13.4 cm lesion in the uterus, concerning for known uterine   malignancy. Comparison with any prior outside examination and/or pelvic MRI   with IV contrast may be useful further evaluation. 2. Macroscopic fat containing left adnexal lesion, likely a dermoid. 3. There is retroperitoneal, iliac chain, and pelvic lymphadenopathy. 4. Hepatic steatosis. Ill-defined hypoattenuating lesion in the left hepatic   lobe is indeterminate. Hepatic protocol MRI or CT is recommended for further   evaluation. 5. Innumerable pulmonary nodules with lymphadenopathy in the lower   mediastinum, concerning for metastatic disease. 6. 1.8 cm hyperattenuating left renal lesion is indeterminate. Adrenal   protocol MRI or CT is recommended for further evaluation. 7. Wall thickening in the distal colon/rectum. This could be seen with   underdistention, but colitis would be difficult to exclude. Clinical   correlation is recommended. 8. Small ascites. 9. There are few small lucent lesions in the lumbar spine, nonspecific. MRI   could provide further information. XR CHEST PORTABLE   Final Result   Mild right basilar airspace disease, possibly pneumonia. Innumerable pulmonary nodules measuring up to 2 cm stable to slightly   increased from 01/20/2010. Clinical history supplied for the study performed   12 years ago was metastatic lung cancer. Correlate clinically.          US RETROPERITONEAL COMPLETE    (Results Pending)   MRI PELVIS W WO CONTRAST    (Results Pending) Primary Problem  Gastrointestinal hemorrhage    Active Hospital Problems    Diagnosis Date Noted    Endometrial cancer ([facility] [C54.1] 08/13/2022     Priority: Medium    Lower GI bleed [K92.2] 08/12/2022     Priority: Medium    Chronic suppurative otitis media of right ear [H66.3X1] 08/11/2022     Priority: Medium    Vaginal bleeding [N93.9] 08/11/2022     Priority: Medium    Blood loss anemia [D50.0] 08/11/2022     Priority: Medium    Abdominal pain [R10.9] 08/11/2022     Priority: Medium    Gastrointestinal hemorrhage [K92.2] 08/09/2022     Priority: Medium    Pelvic mass [R19.00] 12/01/2016    Essential hypertension [I10] 09/21/2012    Diabetes mellitus type 2 in obese ([facility] [E11.69, E66.9] 09/21/2012         IMPRESSION:   Pelvic mass/abdominal lymphadenopathy  Abdominal pain related to above  Anemia  Vaginal bleed, likely related to progressive endometrial cancer    RECOMMENDATIONS:  I reviewed the laboratory data, imaging studies, discussed the diagnosis and possible treatment    Looking at the CT scan, the patient has progressive metastatic cancer. It seems that she had missed multiple appointments to the oncologist and her cancer has not been addressed. The patient is clear about her wishes to continue on with oncologic treatment. But she wants to do that at [address] We will arrange for her to see the oncologist there. Appreciate palliative care input. Patient still adamant about pursuing active therapy although she has not made it to the office. Monitor H&H and keep hemoglobin above 7  I am not sure of any further inpatient work-up is necessary. Patient has diagnosed malignancy and needs to follow-up with medical oncology or GYN oncology. Okay to discharge to SNF from my perspective and follow-up with [facility] oncology    Discussed with patient and Nurse. Thank you for asking us to see this patient.        Galina Keller MD  Hematologist/Medical Oncologist  [facility] hematology oncology physicians                                  This note is created with the assistance of a speech recognition program.  While intending to generate a document that actually reflects the content of the visit, the document can still have some errors including those of syntax and sound a like substitutions which may escape proof reading. It such instances, actual meaning can be extrapolated by contextual diversion.      Hematologist/Medical Oncologist

## 2022-08-14 NOTE — PROGRESS NOTES
Patient requested PRN pain medication with evening meds. Upon assessing patient's pain level, patient denies pain at this time. Patient stated \"hopefully the extra pain med will knock me out. \"    PRN medication not given at this time; Patient educated on assessing pain levels, non-pharmacological interventions and when to call RN for PRN medication. Electronically signed by Alexis Bob RN.

## 2022-08-15 ENCOUNTER — APPOINTMENT (OUTPATIENT)
Dept: ULTRASOUND IMAGING | Age: 72
DRG: 530 | End: 2022-08-15
Payer: MEDICAID

## 2022-08-15 LAB
ABO/RH: NORMAL
ABSOLUTE EOS #: 0.2 K/UL (ref 0–0.4)
ABSOLUTE LYMPH #: 1.19 K/UL (ref 1–4.8)
ABSOLUTE MONO #: 0.79 K/UL (ref 0.1–1.3)
ANION GAP SERPL CALCULATED.3IONS-SCNC: 6 MMOL/L (ref 9–17)
ANTIBODY SCREEN: NEGATIVE
ARM BAND NUMBER: NORMAL
BASOPHILS # BLD: 1 % (ref 0–2)
BASOPHILS ABSOLUTE: 0.1 K/UL (ref 0–0.2)
BLD PROD TYP BPU: NORMAL
BLOOD BANK BLOOD PRODUCT EXPIRATION DATE: NORMAL
BLOOD BANK COMMENT: NORMAL
BLOOD BANK ISBT PRODUCT BLOOD TYPE: 5100
BLOOD BANK PRODUCT CODE: NORMAL
BLOOD BANK UNIT TYPE AND RH: NORMAL
BPU ID: NORMAL
BUN BLDV-MCNC: 9 MG/DL (ref 8–23)
CALCIUM SERPL-MCNC: 9.5 MG/DL (ref 8.6–10.4)
CHLORIDE BLD-SCNC: 101 MMOL/L (ref 98–107)
CO2: 27 MMOL/L (ref 20–31)
CREAT SERPL-MCNC: 0.62 MG/DL (ref 0.5–0.9)
CROSSMATCH RESULT: NORMAL
DISPENSE STATUS BLOOD BANK: NORMAL
EOSINOPHILS RELATIVE PERCENT: 2 % (ref 0–4)
EXPIRATION DATE: NORMAL
GFR AFRICAN AMERICAN: >60 ML/MIN
GFR NON-AFRICAN AMERICAN: >60 ML/MIN
GFR SERPL CREATININE-BSD FRML MDRD: ABNORMAL ML/MIN/{1.73_M2}
GLUCOSE BLD-MCNC: 127 MG/DL (ref 65–105)
GLUCOSE BLD-MCNC: 131 MG/DL (ref 65–105)
GLUCOSE BLD-MCNC: 137 MG/DL (ref 70–99)
GLUCOSE BLD-MCNC: 144 MG/DL (ref 65–105)
HCT VFR BLD CALC: 25 % (ref 36–46)
HEMOGLOBIN: 7.8 G/DL (ref 12–16)
LYMPHOCYTES # BLD: 12 % (ref 24–44)
MCH RBC QN AUTO: 20.2 PG (ref 26–34)
MCHC RBC AUTO-ENTMCNC: 31.1 G/DL (ref 31–37)
MCV RBC AUTO: 65 FL (ref 80–100)
MONOCYTES # BLD: 8 % (ref 1–7)
MORPHOLOGY: ABNORMAL
PDW BLD-RTO: 29.1 % (ref 11.5–14.9)
PLATELET # BLD: 365 K/UL (ref 150–450)
PMV BLD AUTO: 7.7 FL (ref 6–12)
POTASSIUM SERPL-SCNC: 4.8 MMOL/L (ref 3.7–5.3)
RBC # BLD: 3.85 M/UL (ref 4–5.2)
SEG NEUTROPHILS: 77 % (ref 36–66)
SEGMENTED NEUTROPHILS ABSOLUTE COUNT: 7.62 K/UL (ref 1.3–9.1)
SODIUM BLD-SCNC: 134 MMOL/L (ref 135–144)
TRANSFUSION STATUS: NORMAL
UNIT DIVISION: 0
UNIT ISSUE DATE/TIME: NORMAL
WBC # BLD: 9.9 K/UL (ref 3.5–11)

## 2022-08-15 PROCEDURE — 82947 ASSAY GLUCOSE BLOOD QUANT: CPT

## 2022-08-15 PROCEDURE — 36415 COLL VENOUS BLD VENIPUNCTURE: CPT

## 2022-08-15 PROCEDURE — 6370000000 HC RX 637 (ALT 250 FOR IP): Performed by: INTERNAL MEDICINE

## 2022-08-15 PROCEDURE — 76770 US EXAM ABDO BACK WALL COMP: CPT

## 2022-08-15 PROCEDURE — 99232 SBSQ HOSP IP/OBS MODERATE 35: CPT | Performed by: INTERNAL MEDICINE

## 2022-08-15 PROCEDURE — 80048 BASIC METABOLIC PNL TOTAL CA: CPT

## 2022-08-15 PROCEDURE — 85025 COMPLETE CBC W/AUTO DIFF WBC: CPT

## 2022-08-15 PROCEDURE — 97110 THERAPEUTIC EXERCISES: CPT

## 2022-08-15 PROCEDURE — 6370000000 HC RX 637 (ALT 250 FOR IP): Performed by: NURSE PRACTITIONER

## 2022-08-15 PROCEDURE — 2060000000 HC ICU INTERMEDIATE R&B

## 2022-08-15 RX ORDER — PANTOPRAZOLE SODIUM 40 MG/1
40 TABLET, DELAYED RELEASE ORAL DAILY
Qty: 30 TABLET | Refills: 3 | Status: SHIPPED | OUTPATIENT
Start: 2022-08-15

## 2022-08-15 RX ORDER — OXYCODONE HYDROCHLORIDE AND ACETAMINOPHEN 5; 325 MG/1; MG/1
1 TABLET ORAL EVERY 6 HOURS PRN
Qty: 12 TABLET | Refills: 0 | Status: SHIPPED | OUTPATIENT
Start: 2022-08-15 | End: 2022-08-18

## 2022-08-15 RX ADMIN — GABAPENTIN 300 MG: 300 CAPSULE ORAL at 20:00

## 2022-08-15 RX ADMIN — AMOXICILLIN AND CLAVULANATE POTASSIUM 1 TABLET: 875; 125 TABLET, FILM COATED ORAL at 08:53

## 2022-08-15 RX ADMIN — FERROUS SULFATE TAB 325 MG (65 MG ELEMENTAL FE) 325 MG: 325 (65 FE) TAB at 20:00

## 2022-08-15 RX ADMIN — PANTOPRAZOLE SODIUM 40 MG: 40 TABLET, DELAYED RELEASE ORAL at 07:25

## 2022-08-15 RX ADMIN — GABAPENTIN 300 MG: 300 CAPSULE ORAL at 14:51

## 2022-08-15 RX ADMIN — FERROUS SULFATE TAB 325 MG (65 MG ELEMENTAL FE) 325 MG: 325 (65 FE) TAB at 08:53

## 2022-08-15 RX ADMIN — GABAPENTIN 300 MG: 300 CAPSULE ORAL at 08:53

## 2022-08-15 RX ADMIN — ATORVASTATIN CALCIUM 10 MG: 10 TABLET, FILM COATED ORAL at 08:53

## 2022-08-15 RX ADMIN — HYDROCODONE BITARTRATE AND ACETAMINOPHEN 1 TABLET: 5; 325 TABLET ORAL at 03:44

## 2022-08-15 RX ADMIN — HYDROCODONE BITARTRATE AND ACETAMINOPHEN 1 TABLET: 5; 325 TABLET ORAL at 20:00

## 2022-08-15 ASSESSMENT — PAIN DESCRIPTION - LOCATION: LOCATION: ABDOMEN

## 2022-08-15 ASSESSMENT — PAIN SCALES - GENERAL: PAINLEVEL_OUTOF10: 10

## 2022-08-15 ASSESSMENT — PAIN DESCRIPTION - ORIENTATION: ORIENTATION: RIGHT;LOWER

## 2022-08-15 NOTE — PLAN OF CARE
RN  Outcome: Progressing  Note: Patient shows no new signs of skin breakdown during this shift. Patient calls out appropriately with call light for assistance with ambulation to restroom and/or assistance with getting cleaned up. Problem: Safety - Adult  Goal: Free from fall injury  8/15/2022 0403 by Gloria Lees RN  Outcome: Progressing  Flowsheets (Taken 8/15/2022 0403)  Free From Fall Injury:   Instruct family/caregiver on patient safety   Based on caregiver fall risk screen, instruct family/caregiver to ask for assistance with transferring infant if caregiver noted to have fall risk factors  Note: Patient remains free from falls during this shift. Bed in lowest position, side rails up x2, call light/personal belongings/side table within reach and bed alarm activated. Patient calls out appropriately with call light for assistance with ambulation. Telesitter discontinued this shift.        Problem: Chronic Conditions and Co-morbidities  Goal: Patient's chronic conditions and co-morbidity symptoms are monitored and maintained or improved  8/15/2022 0403 by Gloria Lees RN  Outcome: Progressing

## 2022-08-15 NOTE — PROGRESS NOTES
Physical Therapy  Facility/Department: [facility] PROGRESSIVE CARE  Daily Treatment Note  NAME: Anna Marie Villanueva  : 1950  MRN: [id number]    Date of Service: 8/15/2022    Discharge Recommendations:  Patient would benefit from continued therapy after discharge   PT Equipment Recommendations  Equipment Needed: No (has rollator and quad cane)    Patient Diagnosis(es): The primary encounter diagnosis was Lower GI bleed. Diagnoses of Essential hypertension, Endometrial cancer ([facility] [address] and Generalized abdominal pain were also pertinent to this visit. Assessment   Activity Tolerance: Patient limited by fatigue;Patient limited by pain; Patient limited by endurance  Equipment Needed: No (has rollator and quad cane)     Plan    Plan  Plan: 5-7 times per week (5-7 treatments/ week)  Specific Instructions for Next Treatment: advance gait distance, instruct in exercise for strengthening and energy conservation and balance, progress to platform steps  Current Treatment Recommendations: Strengthening;Gait training;Balance training;Functional mobility training;Stair training;Transfer training; Safety education & training;Patient/Caregiver education & training; Endurance training     Restrictions  Restrictions/Precautions  Restrictions/Precautions: Fall Risk, Seizure  Required Braces or Orthoses?: No  Implants present? :  (IVC filter)  Position Activity Restriction  Other position/activity restrictions: OT/PT eval and treat, up with assist     Subjective    Subjective  Subjective: pt visiting w/ Jacquie from pallitive care when therapist entered the room. Pt C/O fatigue and abdominal pain/ low back pain limiting activity. Pain: 10/ 10 constant pain abdomen and lower back  Orientation  Overall Orientation Status: Within Functional Limits  Orientation Level: Oriented X4  Cognition  Overall Cognitive Status: Exceptions  Arousal/Alertness: Delayed responses to stimuli  Following Commands:  Follows one step commands with increased time  Attention Span: Attends with cues to redirect  Memory: Decreased short term memory  Safety Judgement: Decreased awareness of need for assistance  Problem Solving: Assistance required to identify errors made  Insights: Decreased awareness of deficits  Initiation: Does not require cues  Sequencing: Requires cues for some     Objective   Vitals     Bed Mobility Training  Bed Mobility Training: Yes  Rolling: Supervision  Supine to Sit: Supervision  Scooting: Supervision  Balance  Sitting: Intact  Standing: Impaired  Transfer Training  Transfer Training:  (pt refused up in bedside chair)  Gait Training  Gait Training:  (pt refused)  Gait  Overall Level of Assistance     PT Exercises  A/AROM Exercises: attempted supine exercises for ankle pumps x 20 and quad sets x 7 reps. Pt requested to sit at the EOB due to 10/10 pain. Completed LAQs, marching and hip abd/add x 7-10 reps. Pt requested to halt treatment due to pain level. Safety Devices  Type of Devices: All fall risk precautions in place; Patient at risk for falls;Call light within reach;Nurse notified; Left in bed (nurse Adry notified)       Goals  Short Term Goals  Time Frame for Short term goals: 5-7 treatments/ week  Short term goal 1: pt to tolerate 1/2 hour of therapuetic exercise and activity keeping O2 sats above 90%  Short term goal 2: pt to demonstrate good technique for LE strengthening exercises, balance activities and energy conservation techniques  Short term goal 3: pt to demonstrate safe technique for transfers w/ CGA x 1 using wheeled walker  Short term goal 4: pt to demonstrate gait 50-80' using wheeled walker w/ CGA x 1 and O2 as ordered  Short term goal 5: pt to demonstrate good ambulatory balance using wheeled walker  Additional Goals?: Yes  Short Term Goal 6: pt to demonstrate ability to ascend/ descend 4-6\" platform step using AD w/ min x 1  Patient Goals   Patient goals : did not state a goal    Education  Patient Education  Education Given To: Patient  Education Provided: Role of Therapy;Plan of Care  Education Method: Demonstration;Verbal  Barriers to Learning: Other (Comment) (fatigue)  Education Outcome: Verbalized understanding;Continued education needed      AM-PAC Mobility Inpatient   How much difficulty turning over in bed?: A Little  How much difficulty sitting down on / standing up from a chair with arms?: Unable  How much difficulty moving from lying on back to sitting on side of bed?: A Little  How much help from another person moving to and from a bed to a chair?: Total  How much help from another person needed to walk in hospital room?: Total  How much help from another person for climbing 3-5 steps with a railing?: Total  AM-PAC Inpatient Mobility Raw Score : 10  AM-PAC Inpatient T-Scale Score : 32.29  Mobility Inpatient CMS 0-100% Score: 76.75  Mobility Inpatient CMS G-Code Modifier : CL    Therapy Time   Individual Concurrent Group Co-treatment   Time In 1427         Time Out 1438         Minutes 11         Timed Code Treatment Minutes: 5952 Darien Shaw PT

## 2022-08-15 NOTE — PROGRESS NOTES
Today's Date: 8/15/2022  Patient Name: Anna Marie Villanueva  Date of admission: 8/9/2022  6:43 PM  Patient's age: 67 y.o., 1950  Admission Dx: Gastrointestinal hemorrhage [K92.2]  Lower GI bleed [K92.2]    Reason for Consult: management recommendations  Requesting Physician: Adeline Goldmann, MD    CHIEF COMPLAINT: GI bleed    History Obtained From:  patient    Interval history  The patient is seen and evaluated. The bleeding has improved/subsided. And hemoglobin has been stable over the last few days. The patient is more lucid today and was able to give me more information. I also called [address] and it seems unfortunately that she has never been seen and all what she has been telling me previously about the diagnosis of metastatic endometrial cancer was based on speculation rather than based on medical recommendation. I did not find any previous pathology confirming that she has metastatic disease  The patient is not establish at any oncologist and is now willing to follow-up with [facility] oncology. HISTORY OF PRESENT ILLNESS:      The patient is a 67 y.o.   female who is admitted to the hospital for GI bleed the staff noticed bright red blood in the stool, similar admission to the hospital at [facility] with vaginal and rectal bleed work-up was done is unavailable at this time, patient had pelvic mass adnexal mass and bilateral lung masses concerning for metastatic endometrial cancer she missed several appointment with Dr. Isidra Ayala also she does have IVC filter after she had bilateral pulmonary embolism started on anticoagulation complicated by GI bleed, she did have abdominal pain no fever or chills or weight loss or nausea or vomiting  CT abdomen on July 24 did show extensive retroperitoneal adenopathy encasement of the aorta and multiple pelvic vein noted no bowel obstruction    Past Medical History:   has a past medical history of Anxiety and depression, Anxiety and depression, Endometrial cancer ([facility] [address] Fibroid, Hyperlipidemia, Hypertension, Leiomyomatosis, Lung nodule, Obesity, Osteoarthritis, Radiculopathy, cervical, Renal mass, Restrictive lung disease, Retinal abnormality - diabetes related, and Type II or unspecified type diabetes mellitus without mention of complication, not stated as uncontrolled. Past Surgical History:   has a past surgical history that includes Lung biopsy; Cardiac catheterization; and  section. Medications:    Reviewed in Epic     Allergies:  Patient has no known allergies. Social History:   reports that she has never smoked. She has never used smokeless tobacco. She reports that she does not drink alcohol and does not use drugs. Family History: family history includes Cancer in her father; Diabetes in her mother; Heart Disease in her mother; High Blood Pressure in her mother. REVIEW OF SYSTEMS:    Constitutional: No fever or chills. No night sweats, no weight loss   Eyes: No eye discharge, double vision, or eye pain   HEENT: negative for sore mouth, sore throat, hoarseness and voice change   Respiratory: negative for cough , sputum, dyspnea, wheezing, hemoptysis, chest pain   Cardiovascular: negative for chest pain, dyspnea, palpitations, orthopnea, PND   Gastrointestinal: negative for nausea, vomiting, diarrhea, constipation, positive for abdominal pain, no dysphagia, hematemesis positive for hematochezia   Genitourinary: negative for frequency, dysuria, nocturia, urinary incontinence, and hematuria   Integument: negative for rash, skin lesions, bruises.    Hematologic/Lymphatic: negative for easy bruising, bleeding, lymphadenopathy, or petechiae   Endocrine: negative for heat or cold intolerance,weight changes, change in bowel habits and hair loss   Musculoskeletal: negative for myalgias, arthralgias, pain, joint swelling,and bone pain   Neurological: negative for headaches, dizziness, seizures, weakness, this, there is another probable fibroid measuring up   to 4.7 cm. Malignancy cannot be excluded on the basis of this examination. Consider contrast-enhanced pelvic MRI for further evaluation. CT ABDOMEN PELVIS W IV CONTRAST Additional Contrast? Oral   Final Result   1. Heterogeneous 13.4 cm lesion in the uterus, concerning for known uterine   malignancy. Comparison with any prior outside examination and/or pelvic MRI   with IV contrast may be useful further evaluation. 2. Macroscopic fat containing left adnexal lesion, likely a dermoid. 3. There is retroperitoneal, iliac chain, and pelvic lymphadenopathy. 4. Hepatic steatosis. Ill-defined hypoattenuating lesion in the left hepatic   lobe is indeterminate. Hepatic protocol MRI or CT is recommended for further   evaluation. 5. Innumerable pulmonary nodules with lymphadenopathy in the lower   mediastinum, concerning for metastatic disease. 6. 1.8 cm hyperattenuating left renal lesion is indeterminate. Adrenal   protocol MRI or CT is recommended for further evaluation. 7. Wall thickening in the distal colon/rectum. This could be seen with   underdistention, but colitis would be difficult to exclude. Clinical   correlation is recommended. 8. Small ascites. 9. There are few small lucent lesions in the lumbar spine, nonspecific. MRI   could provide further information. XR CHEST PORTABLE   Final Result   Mild right basilar airspace disease, possibly pneumonia. Innumerable pulmonary nodules measuring up to 2 cm stable to slightly   increased from 01/20/2010. Clinical history supplied for the study performed   12 years ago was metastatic lung cancer. Correlate clinically.          US RETROPERITONEAL COMPLETE    (Results Pending)   MRI PELVIS W WO CONTRAST    (Results Pending)       Primary Problem  Gastrointestinal hemorrhage    Active Hospital Problems    Diagnosis Date Noted    Endometrial cancer ([facility] [address] [C54.1] 08/13/2022 Priority: Medium    Lower GI bleed [K92.2] 08/12/2022     Priority: Medium    Chronic suppurative otitis media of right ear [H66.3X1] 08/11/2022     Priority: Medium    Vaginal bleeding [N93.9] 08/11/2022     Priority: Medium    Blood loss anemia [D50.0] 08/11/2022     Priority: Medium    Abdominal pain [R10.9] 08/11/2022     Priority: Medium    Gastrointestinal hemorrhage [K92.2] 08/09/2022     Priority: Medium    Pelvic mass [R19.00] 12/01/2016    Essential hypertension [I10] 09/21/2012    Diabetes mellitus type 2 in obese ([facility] [address] [E11.69, E66.9] 09/21/2012         IMPRESSION:   Pelvic mass/abdominal lymphadenopathy  Abdominal pain related to above  Anemia  Vaginal bleed, likely related to progressive endometrial cancer    RECOMMENDATIONS:  I reviewed the laboratory data, imaging studies, discussed the diagnosis and possible treatment    I found out that the patient really does not have any established diagnosis. And she is not established with any oncologist at [facility] or at [facility]. At this point, her bleeding has subsided, I am recommending to discharge and continue to work-up as an outpatient  The patient is adamant that she wants to pursue therapy and I think the best approach is to see if she will be able to keep her appointments in the cancer center. Monitor hemoglobin  Okay to discharge from my perspective, plan to follow-up with [facility] oncology and GYN oncology to establish diagnosis and talk about treatment. Discussed with patient and Nurse. Thank you for asking us to see this patient.        Long Keller MD  Hematologist/Medical [address] [place] [address] hematology oncology physicians                                  This note is created with the assistance of a speech recognition program.  While intending to generate a document that actually reflects the content of the visit, the document can still have some errors including those of syntax and sound a like substitutions which may escape proof reading. It such instances, actual meaning can be extrapolated by contextual diversion.      Hematologist/Medical Oncologist

## 2022-08-15 NOTE — PROGRESS NOTES
Occupational Therapy  Facility/Department: [facility] PROGRESSIVE CARE  Daily Treatment Note  NAME: Anna Marie Villanueva  : 1950  MRN: [id number]    Date of Service: 8/15/2022    Discharge Recommendations:  Patient would benefit from continued therapy after discharge         Patient Diagnosis(es): The encounter diagnosis was Lower GI bleed. Assessment           Plan   Plan  Times per Week: 5-7  Current Treatment Recommendations: Strengthening;Balance training;Functional mobility training; Endurance training;Pain management; Safety education & training;Patient/Caregiver education & training;Equipment evaluation, education, & procurement;Self-Care / ADL; Home management training;Coordination training     Restrictions  Restrictions/Precautions  Restrictions/Precautions: Fall Risk;Seizure  Required Braces or Orthoses?: No    Subjective   Subjective  Subjective: \" My breathing is getting better, and i'm getting stronger\"  Orientation  Overall Orientation Status: Within Functional Limits  Orientation Level: Oriented X4  Cognition  Overall Cognitive Status: Exceptions  Arousal/Alertness: Delayed responses to stimuli  Following Commands: Follows one step commands with increased time  Attention Span: Attends with cues to redirect  Memory: Decreased short term memory  Safety Judgement: Decreased awareness of need for assistance  Problem Solving: Assistance required to identify errors made  Insights: Decreased awareness of deficits  Initiation: Does not require cues  Sequencing: Requires cues for some        Objective    Vitals     Balance  Sitting: Intact  Standing: Impaired  Gait  Overall Level of Assistance: Contact-guard assistance     ADL  Feeding: Setup  Grooming: Stand by assistance  UE Bathing: Stand by assistance  LE Bathing: Minimal assistance  UE Dressing: Stand by assistance  LE Dressing: Moderate assistance  Toileting: Moderate assistance  Additional Comments: ADL scores based on skilled clinical observation this date. Pt refused ADL activites  OT Exercises  Exercise Treatment: Pt engaged in BUE exercises with med resistance bands, completing 10 reps x2 sets with RB between. Pt reports minimal SOB throughout. Pt remains on 2L O2. Patient Education  Education Given To: Patient  Education Provided: Role of Therapy;Plan of Care; Fall Prevention Strategies; Home Exercise Program  Education Method: Verbal;Printed Information/Hand-outs        AM-PAC Daily Activity Inpatient   How much help for putting on and taking off regular lower body clothing?: A Little  How much help for Bathing?: A Little  How much help for Toileting?: A Little  How much help for putting on and taking off regular upper body clothing?: A Little  How much help for taking care of personal grooming?: A Little  How much help for eating meals?: A Little  AM-[facility] Inpatient Daily Activity Raw Score: 17  AM-PAC Inpatient ADL T-Scale Score : 37.26  ADL Inpatient CMS 0-100% Score: 50.11  ADL Inpatient CMS G-Code Modifier : CK   Goals  Short Term Goals  Time Frame for Short term goals: By discharge  Short Term Goal 1: Pt will perform UB ADLs with Mod I and good safety  Short Term Goal 2: Pt will perform LB ADLs with SBA, good safety, and use of AE/mod techniques as needed  Short Term Goal 3: Pt will complete functional transfers/mobility during self-care tasks with SBA and Good safety while maintaining SpO2 above 90%  Short Term Goal 4: Pt will tolerate standing 5+ minutes during functional activity of choice with Good safety while maintaining Spo2 above 90%  Short Term Goal 5: Pt will actively participate in 15+ minutes of therapeutic exercise/functional activity to improve safety and independence with self-care and mobility  Short Term Goal 6: Pt will be educated on and explore use of DME/AE and modified techniques for increasing ease and independence with ADLs upon d/c       Therapy Time   Individual Concurrent Group Co-treatment   Time In 0956         Time Out 1020 Minutes 24                 Susan Abdi, CONCHITA

## 2022-08-15 NOTE — PROGRESS NOTES
.. PALLIATIVE CARE TEAM    Patient: Anna Marie Villanueva  Room: 2092/2092-01    Reason For Consult   Goals of care evaluation  Distress management  Symptom Management  Guidance and support  Facilitate communications  Assistance in coordinating care  Recommendations for the above    Impression: Anna Marie Truong is a 67y.o. year old female with  has a past medical history of Anxiety and depression, Anxiety and depression, Endometrial cancer ([facility] [address] Fibroid, Hyperlipidemia, Hypertension, Leiomyomatosis, Lung nodule, Obesity, Osteoarthritis, Radiculopathy, cervical, Renal mass, Restrictive lung disease, Retinal abnormality - diabetes related, and Type II or unspecified type diabetes mellitus without mention of complication, not stated as uncontrolled. .  Currently hospitalized for the management of GI hemorrhage. The Palliative Care Team is following to assist with goals of care and support.      Code Status  Full Code    Vital Signs:  /60   Pulse (!) 105   Temp 98.2 °F (36.8 °C) (Axillary)   Resp 20   Ht 5' 5\" (1.651 m)   Wt 180 lb 12.4 oz (82 kg)   SpO2 99%   BMI 30.08 kg/m²     Patient Active Problem List   Diagnosis    Fibroid    Lung nodule    Leiomyomatosis    Restrictive lung disease    Radiculopathy, cervical    Renal mass    DR (diabetic retinopathy) ([facility] [address]    Essential hypertension    Diabetes mellitus type 2 in obese (HCC)    Dermatomyositis (HCC)    OA (osteoarthritis)    Hyperlipidemia    Anxiety and depression    Adnexal mass    Pelvic mass    Thyroid nodule    Exudative age-related macular degeneration of right eye with inactive scar (HCC)    Atrial flutter (HCC)    Gastrointestinal hemorrhage    Chronic suppurative otitis media of right ear    Vaginal bleeding    Blood loss anemia    Abdominal pain    Lower GI bleed    Endometrial cancer ([facility] [address]   PLAN:   - patient will be discharged to rehab and follow outpatient with her original oncology doctors at [facility]  - patient had a supportive family and she completed an HCPOA this stay and appointed her daughter Corrina Rockwell. - Will follow for goals of care and support. Palliative Interaction:Patient is in bed and she is in good spirits. She says\" I think that I am going to day. \"   I ask about her plan of care for her cancer, and she states that she will see the the cancer doctor that she first saw at [facility]. She states that they will make her appointments here before she goes for rehab. She has good support as she has 2 daughters and a son. I offer her much emotional support and she is appreciative. Will follow for goals of care and support.              Electronically signed by   Claire Alvarado RN  Palliative Care Team  on 8/15/2022 at 4:00 PM

## 2022-08-15 NOTE — PLAN OF CARE
Please complete the MRI screening form online. MRI will be schedule once screening has been completed. Any questions, please call [phone]. Thank you!

## 2022-08-15 NOTE — CARE COORDINATION
Patient needs updated notes. Notes were put in after admissions left facility. SW will inform facility first thing in the morning.  Electronically signed by darius Staff, PETE on 8/15/2022 at 4:29 PM

## 2022-08-15 NOTE — FLOWSHEET NOTE
Pt didn't want to talk about her medical condition; spoke about the impact of God in her life and her continued reliance on Him. She always welcomes prayer.     08/15/22 1943   Encounter Summary   Encounter Overview/Reason  Palliative Care   Service Provided For: Patient   Referral/Consult From: Palliative Care   Last Encounter  08/15/22   Complexity of Encounter Moderate   Spiritual/Emotional needs   Type Spiritual Support   Palliative Care   Type Palliative Care, Follow-up   Assessment/Intervention/Outcome   Assessment Calm   Intervention Active listening;Discussed relationship with God;Explored/Affirmed feelings, thoughts, concerns;Prayer (assurance of)/[place];Sustaining Presence/Ministry of presence   Outcome Engaged in conversation;Expressed feelings, needs, and concerns;Expressed Gratitude;Receptive

## 2022-08-15 NOTE — PROGRESS NOTES
Spoke to Dr. Nathaly Yeh about patient refusal of line access to do MRI. Okay with discharge if we are not taking any further action to address vaginal bleeding at this time. Aware of patient's plan to follow up at [facility] with oncology.

## 2022-08-15 NOTE — PLAN OF CARE
Problem: Discharge Planning  Goal: Discharge to home or other facility with appropriate resources  Outcome: Progressing  Flowsheets (Taken 8/15/2022 0858)  Discharge to home or other facility with appropriate resources: Identify barriers to discharge with patient and caregiver     Problem: Pain  Goal: Verbalizes/displays adequate comfort level or baseline comfort level  Outcome: Progressing     Problem: Skin/Tissue Integrity  Goal: Absence of new skin breakdown  Description: 1. Monitor for areas of redness and/or skin breakdown  2. Assess vascular access sites hourly  3. Every 4-6 hours minimum:  Change oxygen saturation probe site  4. Every 4-6 hours:  If on nasal continuous positive airway pressure, respiratory therapy assess nares and determine need for appliance change or resting period.   Outcome: Progressing     Problem: Safety - Adult  Goal: Free from fall injury  Outcome: Progressing  Flowsheets (Taken 8/15/2022 0902)  Free From Fall Injury: Instruct family/caregiver on patient safety

## 2022-08-16 VITALS
TEMPERATURE: 97.6 F | OXYGEN SATURATION: 97 % | BODY MASS INDEX: 30.38 KG/M2 | DIASTOLIC BLOOD PRESSURE: 64 MMHG | HEART RATE: 102 BPM | HEIGHT: 65 IN | SYSTOLIC BLOOD PRESSURE: 136 MMHG | RESPIRATION RATE: 18 BRPM | WEIGHT: 182.32 LBS

## 2022-08-16 LAB
GLUCOSE BLD-MCNC: 120 MG/DL (ref 65–105)
GLUCOSE BLD-MCNC: 167 MG/DL (ref 65–105)
GLUCOSE BLD-MCNC: 96 MG/DL (ref 65–105)
SARS-COV-2, RAPID: NOT DETECTED
SPECIMEN DESCRIPTION: NORMAL

## 2022-08-16 PROCEDURE — 6370000000 HC RX 637 (ALT 250 FOR IP): Performed by: NURSE PRACTITIONER

## 2022-08-16 PROCEDURE — 99231 SBSQ HOSP IP/OBS SF/LOW 25: CPT | Performed by: INTERNAL MEDICINE

## 2022-08-16 PROCEDURE — 6370000000 HC RX 637 (ALT 250 FOR IP): Performed by: INTERNAL MEDICINE

## 2022-08-16 PROCEDURE — 99232 SBSQ HOSP IP/OBS MODERATE 35: CPT | Performed by: NURSE PRACTITIONER

## 2022-08-16 PROCEDURE — 87635 SARS-COV-2 COVID-19 AMP PRB: CPT

## 2022-08-16 PROCEDURE — 99239 HOSP IP/OBS DSCHRG MGMT >30: CPT | Performed by: INTERNAL MEDICINE

## 2022-08-16 RX ADMIN — GABAPENTIN 300 MG: 300 CAPSULE ORAL at 10:51

## 2022-08-16 RX ADMIN — FERROUS SULFATE TAB 325 MG (65 MG ELEMENTAL FE) 325 MG: 325 (65 FE) TAB at 10:51

## 2022-08-16 RX ADMIN — HYDROCODONE BITARTRATE AND ACETAMINOPHEN 1 TABLET: 5; 325 TABLET ORAL at 06:28

## 2022-08-16 RX ADMIN — ATORVASTATIN CALCIUM 10 MG: 10 TABLET, FILM COATED ORAL at 10:51

## 2022-08-16 RX ADMIN — PANTOPRAZOLE SODIUM 40 MG: 40 TABLET, DELAYED RELEASE ORAL at 06:20

## 2022-08-16 RX ADMIN — GABAPENTIN 300 MG: 300 CAPSULE ORAL at 13:05

## 2022-08-16 NOTE — FLOWSHEET NOTE
08/16/22 0940   Encounter Summary   Encounter Overview/Reason  Palliative Care   Service Provided For: Patient   Referral/Consult From: Palliative Care   Last Encounter  08/16/22   Complexity of Encounter Moderate   Spiritual/Emotional needs   Type Spiritual Support   Palliative Care   Type Palliative Care, Follow-up   Assessment/Intervention/Outcome   Assessment Unable to assess  (Sleeping)   Intervention Prayer (assurance of)/[place]

## 2022-08-16 NOTE — PROGRESS NOTES
Palliative Care Progress Note    NAME:  Anna Marie Villanueva  MEDICAL RECORD NUMBER:  [id number]  AGE: 67 y.o. GENDER: female  : 1950  TODAY'S DATE:  2022    Reason for Consult:    Goals of care   Family support     Summary   Patient a full code status   Going to [facility] at discharge  Daughter Stephanie Ramon patient [facility] [place]. POA   Follow up with outpatient oncology     Plan      Palliative Interaction:  Spoke with patient whom states she is ready for discharge. She is going to [facility] today at 330 pm. Her HGB 7.8 and covid negative no complaints voiced. Patient to follow up with Oncology and Gyn oncology as outpatient for diagnosis and plan of care. Palliative care will follow    Education/support to patient  Discharge planning/helping to coordinate care  Communications with primary service  Pharmacologic pain management  Providing support for coping/adaptation/distress of patient  Discussing meaning/purpose   Caregiver support/education  Continue with current plan of care  Code status clarified: Full Code  Principle Problem/Diagnosis:  Gastrointestinal hemorrhage    Additional Assessments:  Principal Problem:    Gastrointestinal hemorrhage  Active Problems:    Chronic suppurative otitis media of right ear    Vaginal bleeding    Blood loss anemia    Abdominal pain    Lower GI bleed    Endometrial cancer ([facility] [address]    Essential hypertension    Diabetes mellitus type 2 in obese (HCC)    Pelvic mass  Resolved Problems:    * No resolved hospital problems.  *   1- Symptom management/ pain control     Pain Assessment:  Tylenol norco and morphine                Anxiety:  none                          Dyspnea:   respirations relaxed                           Fatigue:   genrealized weakness     Other:    2- Goals of care evaluation   The patient goals of care are improve or maintain function/quality of life   Goals of care discussed with:    [x] Patient independently    [] Patient and Family    [] Family or Healthcare Value Date/Time     08/15/2022 04:38 PM    K 4.8 08/15/2022 04:38 PM     08/15/2022 04:38 PM    CO2 27 08/15/2022 04:38 PM    BUN 9 08/15/2022 04:38 PM    CREATININE 0.62 08/15/2022 04:38 PM    GLUCOSE 137 08/15/2022 04:38 PM    GLUCOSE 109 2022 10:34 AM    CALCIUM 9.5 08/15/2022 04:38 PM      Lab Results   Component Value Date/Time    MG 2.3 2022 07:30 AM    ,  Lab Results   Component Value Date    CALCIUM 9.5 08/15/2022    PHOS 2.3 (L) 2022        Xray Result (most recent):  XR ABDOMEN (KUB) (SINGLE AP VIEW) 2022    Narrative  EXAMINATION:  ONE SUPINE XRAY VIEW(S) OF THE ABDOMEN    2022 11:46 am    COMPARISON:  None. HISTORY:  ORDERING SYSTEM PROVIDED HISTORY: abd pain  TECHNOLOGIST PROVIDED HISTORY:  abd pain    FINDINGS:  AP portable view of the abdomen time stamped at 1213 hours demonstrates  superior vena cava umbrella in appropriate position. Mild to moderate  distension of small bowel and colonic loops is noted with air in the rectum  favoring ileus. No organomegaly or free air is noted. Patient has multiple  pulmonary nodules consistent with diagnosis of metastatic lung cancer. Impression  Appearance favors ileus. MRI Result (most recent):  No results found for this or any previous visit from the past 3650 days.      PAST MEDICAL HISTORY  Past Medical History:   Diagnosis Date    Anxiety and depression     Anxiety and depression     Endometrial cancer ([facility] [address] 2022    Fibroid     Hyperlipidemia     Hypertension     Leiomyomatosis     Lung nodule     Obesity     Osteoarthritis     Radiculopathy, cervical     Renal mass     Restrictive lung disease     Retinal abnormality - diabetes related     Type II or unspecified type diabetes mellitus without mention of complication, not stated as uncontrolled         SURGICAL HISTORY  Past Surgical History:   Procedure Laterality Date    CARDIAC CATHETERIZATION       SECTION      LUNG BIOPSY FAMILY HISTORY  Family History   Problem Relation Age of Onset    High Blood Pressure Mother     Diabetes Mother     Heart Disease Mother     Cancer Father         SOCIAL HISTORY  Social History       Tobacco History       Smoking Status  Never      Smokeless Tobacco Use  Never              Alcohol History       Alcohol Use Status  Never              Drug Use       Drug Use Status  Never              Sexual Activity       Sexually Active  Not Asked                     Assessment        REVIEW OF SYSTEMS    []   UNABLE TO OBTAIN:     Constitutional:  []   Chills   [x]  Fatigue   []  Fevers   [x]  Malaise   []  Weight loss   [] Other:     Respiratory:   []  Cough    []  Shortness of breath    []  Chest pain    Respirations relaxed no distress noted     Cardiovascular:   []  Chest pain  []  Dyspnea    []  Exertional chest pressure/discomfort     [] Fatigue      []  Palpitations    []  Syncope   [x] Other: denies cp or pressure     Gastrointestinal:   []  Abdominal pain   []  Constipation    []  Diarrhea    []   Dysphagia   []  Reflux             []  Vomiting   [x] Other: denies nausea or vomiting     Genitourinary:  []  Dysuria     []  Frequency   []  Hematuria   [] Nocturia   []  Urinary incontinence   [] Other:     Musculoskeletal:   [] Back pain    [x]  Muscle weakness   [x]  Myalgias    []  Neck pain   []  Stiff joints   []  Other:     Behavioral/Psych:   [] Anxiety    []  Depression     []  Mood swings   [x] Other: pleasant    PHYSICAL ASSESSMENT:     General: [x]  Oriented x3      [] well appearing      [] Intubated      [] ill appearing      [] Other:    Mental Status: [x] normal mental status exam      [] drowsy      [] Confused      [] Other:     Cardiovascular: [x]  Regular rate/rhythm      [] Arrhythmia      [] Other:     Chest: [] Effort normal      [] lungs clear      [] respiratory distress      [] Tachypnea      [x]  Other:lungs diminished respirations relaxed     Abdomen: [x] Soft/non-tender      [] Normal appearance      [] Distended      [] Ascites      [] Other:    Neurological: [x] Normal Speech      [x] Normal Sensation      []  Deficits present:      Extremity:  [x] normal skin color/temp      [] clubbing/cyanosis      []  No edema      [] Other:     Palliative Performance Scale:  ___60%  Ambulation reduced; Significant disease; Can't do hobbies/housework; intake normal or reduced; occasional assist; LOC full/confusion  __x_50%  Mainly sit/lie; Extensive disease; Can't do any work; Considerable assist; intake normal or reduced; LOC full/confusion  ___40%  Mainly in bed; Extensive disease; Mainly assist; intake normal or reduced; LOC full/confusion   ___30%  Bed Bound; Extensive disease; Total care; intake reduced; LOCfull/confusion  ___20%  Bed Bound; Extensive disease; Total care; intake minimal; Drowsy/coma  ___10%  Bed Bound; Extensive disease; Total care; Mouth care only; Drowsy/coma  ___0       Death            Palliative Care will continue to follow Ms. Villanueva's care as needed. The note has been dictated by dragon, typing errors may be a possibility     Thank you for allowing Palliative Care to participate in the care of Ms. Villanueva . Electronically signed by   CARLIN Solano NP  Palliative Care Team  on 8/16/2022 at 2:10 PM    [address] Number [phone]    [address] Number [phone] [address] Number [phone]    Please call with any palliative questions or concerns. Palliative Care Team is available via perfect serve or via phone.

## 2022-08-16 NOTE — PROGRESS NOTES
Report called to [facility].  Report given to [facility]    SHAINA is complete

## 2022-08-16 NOTE — PLAN OF CARE

## 2022-08-16 NOTE — PROGRESS NOTES
Today's Date: 2022  Patient Name: Anna Marie Villanueva  Date of admission: 2022  6:43 PM  Patient's age: 67 y.o., 1950  Admission Dx: Gastrointestinal hemorrhage [K92.2]  Lower GI bleed [K92.2]    Reason for Consult: management recommendations  Requesting Physician: Kurt Jarrett MD    CHIEF COMPLAINT: GI bleed    History Obtained From:  patient    Interval history  The patient is seen and evaluated. The bleeding has improved/subsided. And hemoglobin has been stable over the last few days. Plan to discharge to SNF today               HISTORY OF PRESENT ILLNESS:      The patient is a 67 y.o.  female who is admitted to the hospital for GI bleed the staff noticed bright red blood in the stool, similar admission to the hospital at [facility] with vaginal and rectal bleed work-up was done is unavailable at this time, patient had pelvic mass adnexal mass and bilateral lung masses concerning for metastatic endometrial cancer she missed several appointment with Dr. Yazmin Villa also she does have IVC filter after she had bilateral pulmonary embolism started on anticoagulation complicated by GI bleed, she did have abdominal pain no fever or chills or weight loss or nausea or vomiting  CT abdomen on  did show extensive retroperitoneal adenopathy encasement of the aorta and multiple pelvic vein noted no bowel obstruction    Past Medical History:   has a past medical history of Anxiety and depression, Anxiety and depression, Endometrial cancer ([facility] [address] Fibroid, Hyperlipidemia, Hypertension, Leiomyomatosis, Lung nodule, Obesity, Osteoarthritis, Radiculopathy, cervical, Renal mass, Restrictive lung disease, Retinal abnormality - diabetes related, and Type II or unspecified type diabetes mellitus without mention of complication, not stated as uncontrolled. Past Surgical History:   has a past surgical history that includes Lung biopsy; Cardiac catheterization; and  section.      Medications: Reviewed in Epic     Allergies:  Patient has no known allergies. Social History:   reports that she has never smoked. She has never used smokeless tobacco. She reports that she does not drink alcohol and does not use drugs. Family History: family history includes Cancer in her father; Diabetes in her mother; Heart Disease in her mother; High Blood Pressure in her mother. REVIEW OF SYSTEMS:    Constitutional: No fever or chills. No night sweats, no weight loss   Eyes: No eye discharge, double vision, or eye pain   HEENT: negative for sore mouth, sore throat, hoarseness and voice change   Respiratory: negative for cough , sputum, dyspnea, wheezing, hemoptysis, chest pain   Cardiovascular: negative for chest pain, dyspnea, palpitations, orthopnea, PND   Gastrointestinal: negative for nausea, vomiting, diarrhea, constipation, positive for abdominal pain, no dysphagia, hematemesis positive for hematochezia   Genitourinary: negative for frequency, dysuria, nocturia, urinary incontinence, and hematuria   Integument: negative for rash, skin lesions, bruises. Hematologic/Lymphatic: negative for easy bruising, bleeding, lymphadenopathy, or petechiae   Endocrine: negative for heat or cold intolerance,weight changes, change in bowel habits and hair loss   Musculoskeletal: negative for myalgias, arthralgias, pain, joint swelling,and bone pain   Neurological: negative for headaches, dizziness, seizures, weakness, numbness    PHYSICAL EXAM:      /64   Pulse (!) 102   Temp 97.6 °F (36.4 °C) (Oral)   Resp 18   Ht 5' 5\" (1.651 m)   Wt 182 lb 5.1 oz (82.7 kg)   SpO2 97%   BMI 30.34 kg/m²    Temp (24hrs), Av.4 °F (36.9 °C), Min:97.6 °F (36.4 °C), Max:98.9 °F (37.2 °C)    General appearance -awake and oriented  Eyes - pupils equal and reactive, extraocular eye movements intact   Ears -large mass coming out of her right ear.   Not sure what kind of masses there is and she states that she had it for years  Mouth - mucous membranes moist, pharynx normal without lesions   Neck - supple, no significant adenopathy   Lymphatics - no palpable lymphadenopathy, no hepatosplenomegaly   Chest - clear to auscultation, no wheezes, rales or rhonchi, symmetric air entry   Heart - normal rate, regular rhythm, normal S1, S2, no murmurs  Abdomen - soft, tender, nondistended, pelvic mass palpable and tender  Neurological - alert, oriented, normal speech, no focal findings or movement disorder noted   Musculoskeletal - no joint tenderness, deformity or swelling   Extremities - peripheral pulses normal, no pedal edema, no clubbing or cyanosis   Skin - normal coloration and turgor, no rashes, no suspicious skin lesions noted ,    DATA:    Labs:   CBC:   Recent Labs     08/15/22  1638   WBC 9.9   HGB 7.8*   HCT 25.0*        BMP:   Recent Labs     08/15/22  1638   *   K 4.8   CO2 27   BUN 9   CREATININE 0.62   LABGLOM >60   GLUCOSE 137*     PT/INR:   No results for input(s): PROTIME, INR in the last 72 hours. IMAGING DATA:  US RETROPERITONEAL COMPLETE   Preliminary Result   1. No hydronephrosis. 2. Hyperdense lesion seen within the left kidney on recent CT is not well   seen on this exam.  Recommend dedicated renal CT or MRI. XR ABDOMEN (KUB) (SINGLE AP VIEW)   Final Result   Appearance favors ileus. US PELVIS COMPLETE   Final Result   Within the central uterus, there is a large 12.7 cm mass that has the   appearance of a fibroid. Along the left anterior uterus, there is a 7.3 cm   hyperechoic lesion which may represent a lipoleiomyoma based on the CT   appearance. Inferior to this, there is another probable fibroid measuring up   to 4.7 cm. Malignancy cannot be excluded on the basis of this examination. Consider contrast-enhanced pelvic MRI for further evaluation. CT ABDOMEN PELVIS W IV CONTRAST Additional Contrast? Oral   Final Result   1.  Heterogeneous 13.4 cm lesion in the uterus, [K92.2] 08/09/2022     Priority: Medium    Pelvic mass [R19.00] 12/01/2016    Essential hypertension [I10] 09/21/2012    Diabetes mellitus type 2 in obese ([facility] [address] [E11.69, E66.9] 09/21/2012         IMPRESSION:   Pelvic mass/abdominal lymphadenopathy  Abdominal pain related to above  Anemia  Vaginal bleed, likely related to progressive endometrial cancer    RECOMMENDATIONS:  I reviewed the laboratory data, imaging studies, discussed the diagnosis and possible treatment    I found out that the patient really does not have any established diagnosis. And she is not established with any oncologist at [facility] or at [facility]. At this point, her bleeding has subsided, I am recommending to discharge and continue to work-up as an outpatient  Pt will be seen by Dr Lonnie Buck in [facility] office next week. Unless she decides to go to [facility]      Discussed with patient and Nurse. Thank you for asking us to see this patient. Sheri Keller MD  Hematologist/Medical [address] [place] [address] hematology oncology physicians                                  This note is created with the assistance of a speech recognition program.  While intending to generate a document that actually reflects the content of the visit, the document can still have some errors including those of syntax and sound a like substitutions which may escape proof reading. It such instances, actual meaning can be extrapolated by contextual diversion.      Hematologist/Medical Oncologist

## 2022-08-16 NOTE — CARE COORDINATION
Transport arranged for patient to go to [facility] via [employer]. Please complete SHAINA. Rapid COVID needed. Daughter Elizabeth Boo informed. Facility informed. Patient informed. Bedside nurse informed.      Number for Report: [phone]  Electronically signed by PETE Law on 8/16/2022 at 11:42 AM

## 2022-08-18 ENCOUNTER — HOSPITAL ENCOUNTER (OUTPATIENT)
Age: 72
Setting detail: SPECIMEN
Discharge: HOME | End: 2022-08-18

## 2022-08-18 LAB
ANION GAP SERPL CALCULATED.3IONS-SCNC: 7 MMOL/L (ref 9–17)
BUN BLDV-MCNC: 13 MG/DL (ref 8–23)
CALCIUM SERPL-MCNC: 9.4 MG/DL (ref 8.6–10.4)
CHLORIDE BLD-SCNC: 97 MMOL/L (ref 98–107)
CO2: 28 MMOL/L (ref 20–31)
CREAT SERPL-MCNC: 0.48 MG/DL (ref 0.5–0.9)
GFR AFRICAN AMERICAN: >60 ML/MIN
GFR NON-AFRICAN AMERICAN: >60 ML/MIN
GFR SERPL CREATININE-BSD FRML MDRD: ABNORMAL ML/MIN/{1.73_M2}
GLUCOSE BLD-MCNC: 99 MG/DL (ref 70–99)
HCT VFR BLD CALC: 25.2 % (ref 36.3–47.1)
HEMOGLOBIN: 7.9 G/DL (ref 11.9–15.1)
MCH RBC QN AUTO: 20.9 PG (ref 25.2–33.5)
MCHC RBC AUTO-ENTMCNC: 31.3 G/DL (ref 28.4–34.8)
MCV RBC AUTO: 66.7 FL (ref 82.6–102.9)
NRBC AUTOMATED: 0 PER 100 WBC
PDW BLD-RTO: 26.9 % (ref 11.8–14.4)
PLATELET # BLD: 335 K/UL (ref 138–453)
PMV BLD AUTO: 9.1 FL (ref 8.1–13.5)
POTASSIUM SERPL-SCNC: 4.7 MMOL/L (ref 3.7–5.3)
RBC # BLD: 3.78 M/UL (ref 3.95–5.11)
SODIUM BLD-SCNC: 132 MMOL/L (ref 135–144)
WBC # BLD: 10 K/UL (ref 3.5–11.3)

## 2022-08-18 PROCEDURE — P9603 ONE-WAY ALLOW PRORATED MILES: HCPCS

## 2022-08-18 PROCEDURE — 36415 COLL VENOUS BLD VENIPUNCTURE: CPT

## 2022-08-18 PROCEDURE — 80048 BASIC METABOLIC PNL TOTAL CA: CPT

## 2022-08-18 PROCEDURE — 85027 COMPLETE CBC AUTOMATED: CPT

## 2022-08-23 ENCOUNTER — HOSPITAL ENCOUNTER (OUTPATIENT)
Age: 72
Setting detail: SPECIMEN
Discharge: HOME | End: 2022-08-23

## 2022-08-23 LAB
AFP: 27.2 UG/L
ALBUMIN SERPL-MCNC: 3.2 G/DL (ref 3.5–5.2)
ALBUMIN/GLOBULIN RATIO: 0.7 (ref 1–2.5)
ALP BLD-CCNC: 58 U/L (ref 35–104)
ALT SERPL-CCNC: <5 U/L (ref 5–33)
ANION GAP SERPL CALCULATED.3IONS-SCNC: 11 MMOL/L (ref 9–17)
AST SERPL-CCNC: 12 U/L
BILIRUB SERPL-MCNC: 0.43 MG/DL (ref 0.3–1.2)
BUN BLDV-MCNC: 10 MG/DL (ref 8–23)
CALCIUM SERPL-MCNC: 9.2 MG/DL (ref 8.6–10.4)
CHLORIDE BLD-SCNC: 96 MMOL/L (ref 98–107)
CHOLESTEROL/HDL RATIO: 3
CHOLESTEROL: 138 MG/DL
CO2: 27 MMOL/L (ref 20–31)
CREAT SERPL-MCNC: 0.66 MG/DL (ref 0.5–0.9)
GFR AFRICAN AMERICAN: >60 ML/MIN
GFR NON-AFRICAN AMERICAN: >60 ML/MIN
GFR SERPL CREATININE-BSD FRML MDRD: ABNORMAL ML/MIN/{1.73_M2}
GLUCOSE BLD-MCNC: 99 MG/DL (ref 70–99)
HCT VFR BLD CALC: 27.3 % (ref 36.3–47.1)
HDLC SERPL-MCNC: 46 MG/DL
HEMOGLOBIN: 8.2 G/DL (ref 11.9–15.1)
LDL CHOLESTEROL: 72 MG/DL (ref 0–130)
MCH RBC QN AUTO: 20.9 PG (ref 25.2–33.5)
MCHC RBC AUTO-ENTMCNC: 30 G/DL (ref 28.4–34.8)
MCV RBC AUTO: 69.5 FL (ref 82.6–102.9)
NRBC AUTOMATED: 0 PER 100 WBC
PDW BLD-RTO: 27 % (ref 11.8–14.4)
PLATELET # BLD: 316 K/UL (ref 138–453)
PMV BLD AUTO: 9.4 FL (ref 8.1–13.5)
POTASSIUM SERPL-SCNC: 4 MMOL/L (ref 3.7–5.3)
RBC # BLD: 3.93 M/UL (ref 3.95–5.11)
SODIUM BLD-SCNC: 134 MMOL/L (ref 135–144)
TOTAL PROTEIN: 7.7 G/DL (ref 6.4–8.3)
TRIGL SERPL-MCNC: 100 MG/DL
WBC # BLD: 8.3 K/UL (ref 3.5–11.3)

## 2022-08-23 PROCEDURE — 36415 COLL VENOUS BLD VENIPUNCTURE: CPT

## 2022-08-23 PROCEDURE — 80061 LIPID PANEL: CPT

## 2022-08-23 PROCEDURE — 85027 COMPLETE CBC AUTOMATED: CPT

## 2022-08-23 PROCEDURE — 82105 ALPHA-FETOPROTEIN SERUM: CPT

## 2022-08-23 PROCEDURE — P9603 ONE-WAY ALLOW PRORATED MILES: HCPCS

## 2022-08-23 PROCEDURE — 80053 COMPREHEN METABOLIC PANEL: CPT

## 2022-08-24 ENCOUNTER — TELEPHONE (OUTPATIENT)
Dept: ONCOLOGY | Age: 72
End: 2022-08-24

## 2022-08-24 ENCOUNTER — OFFICE VISIT (OUTPATIENT)
Dept: ONCOLOGY | Age: 72
End: 2022-08-24
Payer: MEDICAID

## 2022-08-24 VITALS
WEIGHT: 181 LBS | TEMPERATURE: 97.1 F | HEART RATE: 62 BPM | DIASTOLIC BLOOD PRESSURE: 75 MMHG | BODY MASS INDEX: 30.12 KG/M2 | SYSTOLIC BLOOD PRESSURE: 133 MMHG

## 2022-08-24 DIAGNOSIS — N94.89 ADNEXAL MASS: Primary | ICD-10-CM

## 2022-08-24 PROCEDURE — 99215 OFFICE O/P EST HI 40 MIN: CPT | Performed by: INTERNAL MEDICINE

## 2022-08-24 PROCEDURE — G8417 CALC BMI ABV UP PARAM F/U: HCPCS | Performed by: INTERNAL MEDICINE

## 2022-08-24 PROCEDURE — 1123F ACP DISCUSS/DSCN MKR DOCD: CPT | Performed by: INTERNAL MEDICINE

## 2022-08-24 PROCEDURE — G8427 DOCREV CUR MEDS BY ELIG CLIN: HCPCS | Performed by: INTERNAL MEDICINE

## 2022-08-24 PROCEDURE — 1111F DSCHRG MED/CURRENT MED MERGE: CPT | Performed by: INTERNAL MEDICINE

## 2022-08-24 PROCEDURE — 3017F COLORECTAL CA SCREEN DOC REV: CPT | Performed by: INTERNAL MEDICINE

## 2022-08-24 PROCEDURE — 1090F PRES/ABSN URINE INCON ASSESS: CPT | Performed by: INTERNAL MEDICINE

## 2022-08-24 PROCEDURE — 99211 OFF/OP EST MAY X REQ PHY/QHP: CPT | Performed by: INTERNAL MEDICINE

## 2022-08-24 PROCEDURE — G8399 PT W/DXA RESULTS DOCUMENT: HCPCS | Performed by: INTERNAL MEDICINE

## 2022-08-24 PROCEDURE — 1036F TOBACCO NON-USER: CPT | Performed by: INTERNAL MEDICINE

## 2022-08-24 RX ORDER — BLOOD SUGAR DIAGNOSTIC
STRIP MISCELLANEOUS
COMMUNITY
Start: 2022-08-22

## 2022-08-24 RX ORDER — LATANOPROST 50 UG/ML
SOLUTION/ DROPS OPHTHALMIC
COMMUNITY
Start: 2022-08-22

## 2022-08-24 RX ORDER — LANCETS 33 GAUGE
EACH MISCELLANEOUS
COMMUNITY
Start: 2022-08-22

## 2022-08-24 RX ORDER — ONDANSETRON 4 MG/1
TABLET, ORALLY DISINTEGRATING ORAL
COMMUNITY

## 2022-08-24 RX ORDER — OXYCODONE HYDROCHLORIDE AND ACETAMINOPHEN 5; 325 MG/1; MG/1
TABLET ORAL
COMMUNITY

## 2022-08-24 RX ORDER — FUROSEMIDE 20 MG/1
TABLET ORAL
COMMUNITY

## 2022-08-24 RX ORDER — RIVAROXABAN 10 MG/1
10 TABLET, FILM COATED ORAL
Qty: 30 TABLET | Refills: 1 | Status: SHIPPED | OUTPATIENT
Start: 2022-08-24

## 2022-08-24 NOTE — TELEPHONE ENCOUNTER
Name: Anna Marie Villanueva  : 1950  MRN: [id number]    Oncology Navigation Follow-Up Note    Contact Type:  Medical Oncology  Notes: Dr. Iam Franks alerted writer pt w/recent admission & completed post hospital f/u. Dr. Iam Franks stated pt agreeable to proceed with bx & gyn-onc consultation. Dr. Iam Franks requested writer resume oncology navigation. Met with pt in exam room, notified Dianne Boo will resume oncology navigation, & writer's business card given to pt. Pt denied questions/concerns. Instructed pt may contact writer prn. Pt transferred via w/c to [facility] check out. Notified Meka [facility] , pt currently @ [address] will need to coordinate bx with SNF nurse. Will continue to follow.     Electronically signed by Ruba Leal RN on 2022 at 1:11 PM

## 2022-08-24 NOTE — TELEPHONE ENCOUNTER
AVS From 8/24/22    Refer to gyn onc   Biopsy per ir , first avaiualble   Rv after biopsy   Navigator consult     Emailed IR to schedule biopsy for pt    Writer will follow to schedule rv after biopsy    Pt was given AVS and appointment schedule    Electronically signed by Janett Pinon on 8/24/2022 at 2:23 PM

## 2022-08-24 NOTE — TELEPHONE ENCOUNTER
Name: Anna Marie Villanueva  : 1950  MRN: [id number]    Oncology Navigation Follow-Up Note    Contact Type:  Telephone  Notes: Toñito Espinoza, [facility] IR , stated no instructions on what to bx in referral.  Dr. Aydin Middleton updated. Dr. Aydin Middleton stated bx pelvic mass, new referral placed. Will continue to follow.     Electronically signed by Carlos Blunt RN on 2022 at 1:41 PM

## 2022-08-25 ENCOUNTER — TELEPHONE (OUTPATIENT)
Dept: INTERVENTIONAL RADIOLOGY/VASCULAR | Age: 72
End: 2022-08-25

## 2022-08-25 NOTE — TELEPHONE ENCOUNTER
IR received a request for a pelvic mass biopsy.   Dr Ericka Lopez reviewed the images and said no to the biopsy, recommend GYN/ONC eval and poss mri with contrast with contrast.  Message routed to [employer]

## 2022-08-26 ENCOUNTER — TELEPHONE (OUTPATIENT)
Dept: ONCOLOGY | Age: 72
End: 2022-08-26

## 2022-08-26 ENCOUNTER — TELEPHONE (OUTPATIENT)
Dept: GYNECOLOGIC ONCOLOGY | Age: 72
End: 2022-08-26

## 2022-08-26 NOTE — TELEPHONE ENCOUNTER
Name: Anna Marie Villanueva  : 1950  MRN: [id number]    Oncology Navigation Follow-Up Note    Contact Type:  Telephone    Notes: Received message via [employer] from Benavidezmojgan Kaba, [facility] IR , stating Dr. Jonathan Rush reviewed imaging & stated no to bx, recommend gyn/onc eval with possible MRI w/contrast.  Referral to gyn-onc placed by Dr. Isidra Ayala , no appt noted. Spoke with ANN-MARIE Torres gyn-onc, updated on pt & requested appt. Notified Feliberto Vigil pt currently @ [facility]. Dr. Isidra Ayala updated. Will continue to follow.     Electronically signed by Tilford Schlatter, RN on 2022 at 7:56 AM

## 2022-08-26 NOTE — TELEPHONE ENCOUNTER
Spoke to Evan castellano LPN unit manager at [facility]. Calling to scheduled appt for 9/1/22 at 1:00 pm. She will give message to patient nurse and  have her return call.

## 2022-08-29 ENCOUNTER — TELEPHONE (OUTPATIENT)
Dept: GYNECOLOGIC ONCOLOGY | Age: 72
End: 2022-08-29

## 2022-08-29 NOTE — TELEPHONE ENCOUNTER
Called [facility] and spoke with pt nurse. I asked how does the pt ambulate? She stated she can be pivoted from wheelchair to the table with assistance.

## 2022-08-30 ENCOUNTER — HOSPITAL ENCOUNTER (OUTPATIENT)
Age: 72
Setting detail: SPECIMEN
Discharge: HOME | End: 2022-08-30

## 2022-08-30 LAB
ABSOLUTE EOS #: 0.15 K/UL (ref 0–0.4)
ABSOLUTE IMMATURE GRANULOCYTE: 0 K/UL (ref 0–0.3)
ABSOLUTE LYMPH #: 1.31 K/UL (ref 1–4.8)
ABSOLUTE MONO #: 0.46 K/UL (ref 0.1–0.8)
ALBUMIN SERPL-MCNC: 3 G/DL (ref 3.5–5.2)
ALBUMIN/GLOBULIN RATIO: 0.7 (ref 1–2.5)
ALP BLD-CCNC: 53 U/L (ref 35–104)
ALT SERPL-CCNC: <5 U/L (ref 5–33)
ANION GAP SERPL CALCULATED.3IONS-SCNC: 8 MMOL/L (ref 9–17)
AST SERPL-CCNC: 10 U/L
BASOPHILS # BLD: 1 % (ref 0–2)
BASOPHILS ABSOLUTE: 0.08 K/UL (ref 0–0.2)
BILIRUB SERPL-MCNC: 0.36 MG/DL (ref 0.3–1.2)
BUN BLDV-MCNC: 7 MG/DL (ref 8–23)
CALCIUM SERPL-MCNC: 8.9 MG/DL (ref 8.6–10.4)
CHLORIDE BLD-SCNC: 100 MMOL/L (ref 98–107)
CO2: 27 MMOL/L (ref 20–31)
CREAT SERPL-MCNC: 0.66 MG/DL (ref 0.5–0.9)
EOSINOPHILS RELATIVE PERCENT: 2 % (ref 1–4)
GFR AFRICAN AMERICAN: >60 ML/MIN
GFR NON-AFRICAN AMERICAN: >60 ML/MIN
GFR SERPL CREATININE-BSD FRML MDRD: ABNORMAL ML/MIN/{1.73_M2}
GLUCOSE BLD-MCNC: 81 MG/DL (ref 70–99)
HCT VFR BLD CALC: 27.4 % (ref 36.3–47.1)
HEMOGLOBIN: 7.9 G/DL (ref 11.9–15.1)
IMMATURE GRANULOCYTES: 0 %
LYMPHOCYTES # BLD: 17 % (ref 24–44)
MCH RBC QN AUTO: 20.4 PG (ref 25.2–33.5)
MCHC RBC AUTO-ENTMCNC: 28.8 G/DL (ref 28.4–34.8)
MCV RBC AUTO: 70.8 FL (ref 82.6–102.9)
MONOCYTES # BLD: 6 % (ref 1–7)
MORPHOLOGY: ABNORMAL
MORPHOLOGY: ABNORMAL
NRBC AUTOMATED: 0 PER 100 WBC
PDW BLD-RTO: 27.5 % (ref 11.8–14.4)
PLATELET # BLD: 369 K/UL (ref 138–453)
PMV BLD AUTO: 9.1 FL (ref 8.1–13.5)
POTASSIUM SERPL-SCNC: 3.9 MMOL/L (ref 3.7–5.3)
RBC # BLD: 3.87 M/UL (ref 3.95–5.11)
SEG NEUTROPHILS: 74 % (ref 36–66)
SEGMENTED NEUTROPHILS ABSOLUTE COUNT: 5.7 K/UL (ref 1.8–7.7)
SODIUM BLD-SCNC: 135 MMOL/L (ref 135–144)
TOTAL PROTEIN: 7.1 G/DL (ref 6.4–8.3)
WBC # BLD: 7.7 K/UL (ref 3.5–11.3)

## 2022-08-30 PROCEDURE — P9603 ONE-WAY ALLOW PRORATED MILES: HCPCS

## 2022-08-30 PROCEDURE — 36415 COLL VENOUS BLD VENIPUNCTURE: CPT

## 2022-08-30 PROCEDURE — 80053 COMPREHEN METABOLIC PANEL: CPT

## 2022-08-30 PROCEDURE — 85025 COMPLETE CBC W/AUTO DIFF WBC: CPT

## 2022-08-30 NOTE — DISCHARGE SUMMARY
BETY [facility] Internal Medicine  Rosy Byrd MD; Stephanie Kim MD; Renny Chan MD; Elwin Aase, MD Frosty Bjornstad, MD; MD JANY Sousa [facility] Internal Medicine  [facility]    Discharge Summary     Patient ID: Jessica Velazquez  :  1950   MRN: [id number]     ACCOUNT:  [de-identified]   Patient's PCP: Prema Garibay MD  Admit Date: 2022   Discharge Date: 22  Length of Stay: 7  Code Status:  Prior  Admitting Physician: Toshia Fernández MD  Discharge Physician: Toshia Fernández MD     Active Discharge Diagnoses:     Hospital Problem Lists:  Principal Problem:    Gastrointestinal hemorrhage  Active Problems:    Chronic suppurative otitis media of right ear    Vaginal bleeding    Blood loss anemia    Abdominal pain    Lower GI bleed    Endometrial cancer ([facility] [address]    Essential hypertension    Diabetes mellitus type 2 in obese ([facility] [address]    Pelvic mass  Resolved Problems:    * No resolved hospital problems.  *      Admission Condition:  serious     Discharged Condition: poor    Hospital Stay:     Hospital Course:  Anna Marie Aguilera is a 67 y.o. female who was admitted for the management of   Gastrointestinal hemorrhage , presented to ER with GI Problem (ECF staff notice bright red blood in stool this PM.  Patient's lab indicated low hemoglobin yesterday.)  76-year-old female with underlying history of hypertension, hyperlipidemia, diabetes,  Abdominal mass which she was supposed to follow-up with oncology, missed 3 appointments, I discussed at length with oncologist their office sent a letter to the patient,  I had a long discussion with patient and her daughter t,patient did admit that she adamantly miss appointments those were more important, she has been following with the doctors for her eye surgery and exam but did not follow for her abdominal mass which on the CT of the abdomen and pelvis looked like she had extensive mets, no biopsy found in the system,  CAT scan report, radiology mentioned uterine cancer, no biopsy done and pathological reports available at this time,  Admitted on this admission for anemia and possible rectal bleeding, gastroenterology on board, hemoglobin has been staying stable above 7,  No active bleeding at this time,  Chronic anemia possible due to underlying malignancy,  Morbid obesity,  Chronic abdominal pain,  Chronic right ear infection for which we have given her Augmentin at this time seems like she has not seen any ENT physician and ear canal looks extensively inflamed and eroded,  Dced back to [facility]     Follow with oncology as outpt       Review of system:  Denies any nausea vomiting fever chills,  Denies any headaches or blurred vision,  Denies any chest pain shortness of pain orthopnea,   Denies any cough phlegm hemoptysis,  Denies any abdominal pain diarrhea constipation,  Denies any tingling tingling numbness weakness of arms or legs,   Skin no rash,    On examination,  Alert awake oriented x3,  S1-S2 present,  CTA bilateral,  Abdomen soft nontender nondistended bowel sounds present   Extremity no edema no calf tenderness,,  Skin no rash  CNS no focal neurological deficits             Significant therapeutic interventions:     Significant Diagnostic Studies:   Labs / Micro:  CBC:   Lab Results   Component Value Date/Time    WBC 8.3 08/23/2022 06:14 AM    RBC 3.93 08/23/2022 06:14 AM    RBC  07/24/2022 11:21 AM    HGB 8.2 08/23/2022 06:14 AM    HCT 27.3 08/23/2022 06:14 AM    MCV 69.5 08/23/2022 06:14 AM    MCH 20.9 08/23/2022 06:14 AM    MCHC 30.0 08/23/2022 06:14 AM    RDW 27.0 08/23/2022 06:14 AM     08/23/2022 06:14 AM     05/23/2012 11:30 AM     BMP:    Lab Results   Component Value Date/Time    GLUCOSE 99 08/23/2022 06:14 AM    GLUCOSE 109 07/24/2022 10:34 AM     08/23/2022 06:14 AM    K 4.0 08/23/2022 06:14 AM    CL 96 08/23/2022 06:14 AM    CO2 27 08/23/2022 06:14 AM    ANIONGAP 11 08/23/2022 06:14 AM    BUN 10 08/23/2022 06:14 AM    CREATININE 0.66 08/23/2022 06:14 AM    BUNCRER NOT REPORTED 10/23/2019 01:47 PM    CALCIUM 9.2 08/23/2022 06:14 AM    LABGLOM >60 08/23/2022 06:14 AM    GFRAA >60 08/23/2022 06:14 AM    GFR      08/23/2022 06:14 AM     HFP:    Lab Results   Component Value Date/Time    PROT 7.7 08/23/2022 06:14 AM    PROT 7.0 07/24/2022 10:34 AM     CMP:    Lab Results   Component Value Date/Time    GLUCOSE 99 08/23/2022 06:14 AM    GLUCOSE 109 07/24/2022 10:34 AM     08/23/2022 06:14 AM    K 4.0 08/23/2022 06:14 AM    CL 96 08/23/2022 06:14 AM    CO2 27 08/23/2022 06:14 AM    BUN 10 08/23/2022 06:14 AM    CREATININE 0.66 08/23/2022 06:14 AM    ANIONGAP 11 08/23/2022 06:14 AM    ALKPHOS 58 08/23/2022 06:14 AM    ALT <5 08/23/2022 06:14 AM    AST 12 08/23/2022 06:14 AM    BILITOT 0.43 08/23/2022 06:14 AM    LABALBU 3.2 08/23/2022 06:14 AM    LABALBU 3.6 07/24/2022 10:34 AM    ALBUMIN 0.7 08/23/2022 06:14 AM    LABGLOM >60 08/23/2022 06:14 AM    GFRAA >60 08/23/2022 06:14 AM    GFR      08/23/2022 06:14 AM    PROT 7.7 08/23/2022 06:14 AM    PROT 7.0 07/24/2022 10:34 AM    CALCIUM 9.2 08/23/2022 06:14 AM     PT/INR:    Lab Results   Component Value Date/Time    PROTIME 14.2 08/09/2022 07:15 PM    PROTIME 14.8 07/24/2022 10:34 AM    INR 1.1 08/09/2022 07:15 PM     PTT:   Lab Results   Component Value Date/Time    APTT 31.6 08/09/2022 07:15 PM    APTT 29.0 07/24/2022 10:34 AM     FLP:    Lab Results   Component Value Date/Time    CHOL 138 08/23/2022 06:14 AM    TRIG 100 08/23/2022 06:14 AM    HDL 46 08/23/2022 06:14 AM     U/A:    Lab Results   Component Value Date/Time    COLORU CRISTINA 07/24/2022 11:21 AM    GLUCOSEU NEGATIVE 07/24/2022 11:21 AM    KETUA NEGATIVE 07/24/2022 11:21 AM    BILIRUBINUR NEGATIVE 07/24/2022 11:21 AM    NITRU NEGATIVE 07/24/2022 11:21 AM    LEUKOCYTESUR LARGE 07/24/2022 11:21 AM     TSH:    Lab Results   Component Value Date/Time    TSH 1.70 10/29/2016 10:52 AM          Radiology:  No results found. Consultations:    Consults:     Final Specialist Recommendations/Findings:   IP CONSULT TO GI  IP CONSULT TO HEM/ONC  IP CONSULT TO SOCIAL WORK  IP CONSULT TO PALLIATIVE CARE  IP CONSULT TO OB GYN      The patient was seen and examined on day of discharge and this discharge summary is in conjunction with any daily progress note from day of discharge. Discharge plan:     Disposition: To a non-[facility] facility    Physician Follow Up:   Rosalino Melton MD  In 7 days   No follow-up provider specified. Requiring Further Evaluation/Follow Up POST HOSPITALIZATION/Incidental Findings:     Diet: regular diet    Activity: As tolerated    Instructions to Patient:     Discharge Medications:      Medication List        CHANGE how you take these medications      pantoprazole 40 MG tablet  Commonly known as: PROTONIX  Take 1 tablet by mouth in the morning. What changed: Another medication with the same name was removed. Continue taking this medication, and follow the directions you see here. CONTINUE taking these medications      atorvastatin 10 MG tablet  Commonly known as: LIPITOR     ferrous sulfate 325 (65 Fe) MG tablet  Commonly known as: IRON 325     gabapentin 300 MG capsule  Commonly known as: NEURONTIN  Take 1 capsule by mouth 3 times daily for 90 days. STOP taking these medications      oxyCODONE-acetaminophen 5-325 MG per tablet  Commonly known as: PERCOCET            ASK your doctor about these medications      oxyCODONE-acetaminophen 5-325 MG per tablet  Commonly known as: PERCOCET  Take 1 tablet by mouth every 6 hours as needed for Pain for up to 3 days. Ask about: Should I take this medication?                Where to Get Your Medications        You can get these medications from any pharmacy    Bring a paper prescription for each of these medications  oxyCODONE-acetaminophen 5-325 MG per tablet  pantoprazole 40 MG tablet No discharge procedures on file. Time Spent on discharge is  35 mins in patient examination, evaluation, counseling as well as medication reconciliation, prescriptions for required medications, discharge plan and follow up. Electronically signed by   Waleska Turpin MD  8/29/2022  11:27 PM      Thank you Dr. Mara Beck MD for the opportunity to be involved in this patient's care. Please note that this chart was generated using voice recognition Dragon dictation software. Although every effort was made to ensure the accuracy of this automated transcription, some errors in transcription may have occurred.

## 2022-09-01 ENCOUNTER — TELEPHONE (OUTPATIENT)
Dept: GYNECOLOGIC ONCOLOGY | Age: 72
End: 2022-09-01

## 2022-09-01 ENCOUNTER — TELEPHONE (OUTPATIENT)
Dept: ONCOLOGY | Age: 72
End: 2022-09-01

## 2022-09-01 NOTE — TELEPHONE ENCOUNTER
Name: Anna Marie Villanueva  : 1950  MRN: [id number]    Oncology Navigation Follow-Up Note    Contact Type:  Telephone    Notes: Elena Camilo, [facility] gyn-onc, called in stating pt no show for Dr. Ahmadi Ser consult. Elena Camilo stated spoke with nurse @ [facility] & instructed pt declined to leave for appt. Dr. Lolis Valle updated. Will continue to follow.     Electronically signed by Jose Thorpe RN on 2022 at 1:08 PM

## 2022-09-01 NOTE — TELEPHONE ENCOUNTER
Spoke to Ruth-Regino asking if patient was coming to appt today she was to arrive at 12:30 pm. She states patient is refusing to come. She states patient had another appt this morning and refused that as well. She States NP tried to get patient to go and she still refused. Aaron Bradley and  notified. Juan José Song nurse navigator notified.

## 2022-09-06 ENCOUNTER — HOSPITAL ENCOUNTER (OUTPATIENT)
Age: 72
Setting detail: SPECIMEN
Discharge: HOME | End: 2022-09-06

## 2022-09-06 LAB
HCT VFR BLD CALC: 29.8 % (ref 36.3–47.1)
HEMOGLOBIN: 8.4 G/DL (ref 11.9–15.1)
MCH RBC QN AUTO: 20.7 PG (ref 25.2–33.5)
MCHC RBC AUTO-ENTMCNC: 28.2 G/DL (ref 28.4–34.8)
MCV RBC AUTO: 73.4 FL (ref 82.6–102.9)
NRBC AUTOMATED: 0 PER 100 WBC
PDW BLD-RTO: 27 % (ref 11.8–14.4)
PLATELET # BLD: 433 K/UL (ref 138–453)
PMV BLD AUTO: 9.5 FL (ref 8.1–13.5)
RBC # BLD: 4.06 M/UL (ref 3.95–5.11)
VITAMIN D 25-HYDROXY: <6 NG/ML
WBC # BLD: 8 K/UL (ref 3.5–11.3)

## 2022-09-06 PROCEDURE — P9603 ONE-WAY ALLOW PRORATED MILES: HCPCS

## 2022-09-06 PROCEDURE — 36415 COLL VENOUS BLD VENIPUNCTURE: CPT

## 2022-09-06 PROCEDURE — 85027 COMPLETE CBC AUTOMATED: CPT

## 2022-09-06 PROCEDURE — 82306 VITAMIN D 25 HYDROXY: CPT

## 2022-09-06 NOTE — PROGRESS NOTES
Anna Marie Villanueva                                                                                                                  8/24/2022  MRN:   [id number]  YOB: 1950  PCP:                           Ines Mohan MD  Referring Physician: Vel  Treating Physician Name: Renard Aleman MD      Reason for visit:  Chief Complaint   Patient presents with    Follow-up     Review status of disease    Other     Was in the hospital for bleeding      Current problems:  Pelvic mass/abdominal lymphadenopathy  Abdominal pain related to above  Anemia  Vaginal bleed, likely related to progressive endometrial cancer  Lung nodules  B/L PE    Active and recent treatments:  Biopsy    Summary of Case/History:    Anna Marie Villanueva a 67 y. o.female is a patient who is admitted to the hospital for GI bleed the staff noticed bright red blood in the stool, similar admission to the hospital at [facility] with vaginal and rectal bleed work-up was done is unavailable at this time, patient had pelvic mass adnexal mass and bilateral lung masses concerning for metastatic endometrial cancer she missed several appointment with Dr. Jose Juan Appiah also she does have IVC filter after she had bilateral pulmonary embolism started on anticoagulation complicated by GI bleed, she did have abdominal pain no fever or chills or weight loss or nausea or vomiting  CT abdomen on July 24 did show extensive retroperitoneal adenopathy encasement of the aorta and multiple pelvic vein noted no bowel obstruction    Past Medical History:   Past Medical History:   Diagnosis Date    Anxiety and depression     Anxiety and depression     Endometrial cancer ([address] 8/13/2022    Fibroid     Hyperlipidemia     Hypertension     Leiomyomatosis     Lung nodule     Obesity     Osteoarthritis     Radiculopathy, cervical     Renal mass     Restrictive lung disease     Retinal abnormality - diabetes related     Type II or unspecified type diabetes mellitus without mention of complication, not stated as uncontrolled        Past Surgical History:     Past Surgical History:   Procedure Laterality Date    CARDIAC CATHETERIZATION       SECTION      LUNG BIOPSY         Patient Family Social History:     Social History     Socioeconomic History    Marital status: Single     Spouse name: None    Number of children: None    Years of education: None    Highest education level: None   Tobacco Use    Smoking status: Never    Smokeless tobacco: Never   Vaping Use    Vaping Use: Never used   Substance and Sexual Activity    Alcohol use: Never    Drug use: Never     Social Determinants of Health     Financial Resource Strain: Low Risk     Difficulty of Paying Living Expenses: Not hard at all   Food Insecurity: No Food Insecurity    Worried About Running Out of Food in the Last Year: Never true    Ran Out of Food in the Last Year: Never true     Family History   Problem Relation Age of Onset    High Blood Pressure Mother     Diabetes Mother     Heart Disease Mother     Cancer Father      Current Medications:     Current Outpatient Medications   Medication Sig Dispense Refill    oxyCODONE-acetaminophen (PERCOCET) 5-325 MG per tablet Percocet 5 mg-325 mg tablet   Take 1 tablet every 6 hours by oral route. ondansetron (ZOFRAN-ODT) 4 MG disintegrating tablet Zofran ODT 4 mg disintegrating tablet   Place 2 tablets every 6 hours by translingual route. furosemide (LASIX) 20 MG tablet furosemide 20 mg tablet   Take 1 tablet every day by oral route. Ascorbic Acid  MG CPCR ascorbic acid (vitamin C) 500 mg capsule   Take twice a day by oral route. ONETOUCH VERIO strip       Lancets (ONETOUCH DELICA PLUS [id number]) MISC       latanoprost (XALATAN) 0.005 % ophthalmic solution       rivaroxaban (XARELTO) 10 MG TABS tablet Take 1 tablet by mouth daily (with breakfast) 30 tablet 1    pantoprazole (PROTONIX) 40 MG tablet Take 1 tablet by mouth in the morning.  30 tablet 3 atorvastatin (LIPITOR) 10 MG tablet Take 10 mg by mouth in the morning. ferrous sulfate (IRON 325) 325 (65 Fe) MG tablet Take 325 mg by mouth in the morning and 325 mg before bedtime. gabapentin (NEURONTIN) 300 MG capsule Take 1 capsule by mouth 3 times daily for 90 days. 270 capsule 3     No current facility-administered medications for this visit. Allergies:   Patient has no known allergies. Review of Systems:    Constitutional: No fever or chills. No night sweats, no weight loss   Eyes: No eye discharge, double vision, or eye pain   HEENT: negative for sore mouth, sore throat, hoarseness and voice change   Respiratory: negative for cough , sputum, dyspnea, wheezing, hemoptysis, chest pain   Cardiovascular: negative for chest pain, dyspnea, palpitations, orthopnea, PND   Gastrointestinal: negative for nausea, vomiting, diarrhea, constipation, abdominal pain, Dysphagia, hematemesis and hematochezia   Genitourinary: negative for frequency, dysuria, nocturia, urinary incontinence. +ve vaginal bleeding  Integument: negative for rash, skin lesions, bruises.    Hematologic/Lymphatic: negative for easy bruising, bleeding, lymphadenopathy, or petechiae   Endocrine: negative for heat or cold intolerance,weight changes, change in bowel habits and hair loss   Musculoskeletal: negative for myalgias, arthralgias, pain, joint swelling,and bone pain   Neurological: negative for headaches, dizziness, seizures, weakness, numbness        Physical Exam:    Vitals: /75   Pulse 62   Temp 97.1 °F (36.2 °C) (Temporal)   Wt 181 lb (82.1 kg)   BMI 30.12 kg/m²   General appearance - well appearing, no in pain or distress  Mental status - AAO X3  Eyes - pupils equal and reactive, extraocular eye movements intact  Mouth - mucous membranes moist, pharynx normal without lesions  Neck - supple, no significant adenopathy  Lymphatics - no palpable lymphadenopathy, no hepatosplenomegaly  Chest - clear to auscultation, no wheezes, rales or rhonchi, symmetric air entry  Heart - normal rate, regular rhythm, normal S1, S2, no murmurs  Abdomen - soft, nontender, nondistended, no masses or organomegaly  Neurological - alert, oriented, normal speech, no focal findings or movement disorder noted  Extremities - peripheral pulses normal, no pedal edema, no clubbing or cyanosis  Skin - normal coloration and turgor, no rashes, no suspicious skin lesions noted       DATA:    Results for orders placed or performed during the hospital encounter of 08/23/22   AFP Tumor Marker   Result Value Ref Range    AFP (Alpha Fetoprotein) 27.2 (H) <8.4 ug/L   CBC   Result Value Ref Range    WBC 8.3 3.5 - 11.3 k/uL    RBC 3.93 (L) 3.95 - 5.11 m/uL    Hemoglobin 8.2 (L) 11.9 - 15.1 g/dL    Hematocrit 27.3 (L) 36.3 - 47.1 %    MCV 69.5 (L) 82.6 - 102.9 fL    MCH 20.9 (L) 25.2 - 33.5 pg    MCHC 30.0 28.4 - 34.8 g/dL    RDW 27.0 (H) 11.8 - 14.4 %    Platelets 059 097 - 992 k/uL    MPV 9.4 8.1 - 13.5 fL    NRBC Automated 0.0 0.0 per 100 WBC   Comprehensive Metabolic Panel   Result Value Ref Range    Glucose 99 70 - 99 mg/dL    BUN 10 8 - 23 mg/dL    Creatinine 0.66 0.50 - 0.90 mg/dL    Calcium 9.2 8.6 - 10.4 mg/dL    Sodium 134 (L) 135 - 144 mmol/L    Potassium 4.0 3.7 - 5.3 mmol/L    Chloride 96 (L) 98 - 107 mmol/L    CO2 27 20 - 31 mmol/L    Anion Gap 11 9 - 17 mmol/L    Alkaline Phosphatase 58 35 - 104 U/L    ALT <5 (L) 5 - 33 U/L    AST 12 <32 U/L    Total Bilirubin 0.43 0.3 - 1.2 mg/dL    Total Protein 7.7 6.4 - 8.3 g/dL    Albumin 3.2 (L) 3.5 - 5.2 g/dL    Albumin/Globulin Ratio 0.7 (L) 1.0 - 2.5    GFR Non-African American >60 >60 mL/min    GFR African American >60 >60 mL/min    GFR Comment         Lipid Panel   Result Value Ref Range    Cholesterol 138 <200 mg/dL    HDL 46 >40 mg/dL    LDL Cholesterol 72 0 - 130 mg/dL    Chol/HDL Ratio 3.0 <5    Triglycerides 100 <150 mg/dL     XR ANKLE RIGHT (MIN 3 VIEWS)    Result Date: 8/28/2022  Evaluate [facility] Department of Radiology [place] [phone]    ========================================================================== Patient Name: Jad Limon : 1950 Sex: F Age: Race: Black MRN: [id number] Pt. Location: 84 Patient Status: D Visit #: [id number] Ordered Date: 2022 12:10:00 PM Completed Date: 2022 12:17 PM Requesting Provider: Vitaliy Sales Attending Provider: Report Copy To: Signs ^ Symptoms: M25.579 Pain in unspecified ankle and joints of unspecified foot I10 History: [place] Comments: Evaluate Exam: ANKLE RIGHT 3 [facility] Accession #: [id number] ========================================================================== ANKLE RIGHT 3 S  2022 12:17 PM  CLINICAL INDICATIONS: M25.579 Pain in unspecified ankle and joints of unspecified foot I10  TECHNOLOGIST COMMENTS: twisted rt ankle  QUESTION FOR THE RADIOLOGIST: Evaluate  PROTOCOL: AP,Lateral and Oblique views were obtained. COMPARISON: None  FINDINGS:  No acute fractures or dislocations of the right ankle. Contours of the tibial plafond and the talar dome are normal. Ankle mortise is intact. No lytic or blastic lesions involving all visualized osseous structures. Small enthesophyte formation at the calcaneal attachment sites of plantar fascia and Achilles tendon. Osteopenia. IMPRESSION:  - No acute fractures or dislocations of the right ankle. - Osteopenia. Electronically signed: Manda Duran by: , User Resident: Electronically Signed by: Annabelle Hale @ 2022 12:47 PM    XR ABDOMEN (KUB) (SINGLE AP VIEW)    Result Date: 2022  EXAMINATION: ONE SUPINE XRAY VIEW(S) OF THE ABDOMEN 2022 11:46 am COMPARISON: None. HISTORY: ORDERING SYSTEM PROVIDED HISTORY: abd pain TECHNOLOGIST PROVIDED HISTORY: abd pain FINDINGS: AP portable view of the abdomen time stamped at 1213 hours demonstrates superior vena cava umbrella in appropriate position. Mild to moderate distension of small bowel and colonic loops is noted with air in the rectum favoring ileus. No organomegaly or free air is noted. Patient has multiple pulmonary nodules consistent with diagnosis of metastatic lung cancer. Appearance favors ileus. US PELVIS COMPLETE    Result Date: 8/12/2022  EXAMINATION: PELVIC ULTRASOUND 8/12/2022 TECHNIQUE: Transabdominal pelvic ultrasound was performed. COMPARISON: CT on 08/10/2022 HISTORY: Acute abdominal pain. Uterine mass. FINDINGS: Per the sonographer, evaluation was limited due to patient's clinical condition. Along the left anterior uterus, there is a hyperechoic lesion measuring 7.3 x 6.9 x 5.6 cm. Inferior to this, there is a smaller hypoechoic lesion measuring 4.7 x 4.6 x 4.5 cm. Within the central uterus, there is a hypoechoic mass measuring 12.7 x 12.7 x 12.0 cm. Within the central uterus, there is a large 12.7 cm mass that has the appearance of a fibroid. Along the left anterior uterus, there is a 7.3 cm hyperechoic lesion which may represent a lipoleiomyoma based on the CT appearance. Inferior to this, there is another probable fibroid measuring up to 4.7 cm. Malignancy cannot be excluded on the basis of this examination. Consider contrast-enhanced pelvic MRI for further evaluation. CT ABDOMEN PELVIS W IV CONTRAST Additional Contrast? Oral    Result Date: 8/10/2022  EXAMINATION: CT OF THE ABDOMEN AND PELVIS WITH CONTRAST 8/10/2022 1:54 pm TECHNIQUE: CT of the abdomen and pelvis was performed with the administration of intravenous contrast. Multiplanar reformatted images are provided for review. Automated exposure control, iterative reconstruction, and/or weight based adjustment of the mA/kV was utilized to reduce the radiation dose to as low as reasonably achievable. COMPARISON: None.  HISTORY: ORDERING SYSTEM PROVIDED HISTORY: uterine cancer TECHNOLOGIST PROVIDED HISTORY: uterine cancer Reason for Exam: uterine cancer Additional signs and symptoms: Pt had no complaints at the time of CT scan FINDINGS: Lower Chest: There are innumerable bilateral pulmonary nodules, the largest of which measures 2.6 x 1.6 cm in the left lower lobe. There is an enlarged periaortic lymph node at the level of the diaphragm. Organs: The liver is diffusely hypoattenuating. There is some ill-defined hypoattenuation in the left hepatic lobe, indeterminate. No acute abnormality in the gallbladder, spleen, pancreas, or adrenal glands. No nephrolithiasis or hydronephrosis. 1.8 cm hyperattenuating left renal lesion. GI/Bowel: Stomach is partially distended. The small bowel is nondilated. The appendix is nondilated. The colon is nondilated. There is wall thickening in the distal colon/rectum. Pelvis: Bladder is partially distended without vesicular stone. The uterus is markedly enlarged with a 13.4 x 12 cm heterogeneous lesion. There is also a cystic lesion in the uterus/left adnexa. There is a macroscopic fat containing left adnexal lesion. Peritoneum/Retroperitoneum: Small ascites or pneumoperitoneum. Abdominal aorta is normal in caliber. IVC filter is noted. There is retroperitoneal lymphadenopathy. There also enlarged pelvic and iliac chain lymph nodes. Bones/Soft Tissues: Multilevel degenerative changes in the lumbar spine. There are few scattered lucent lesions, indeterminate. 1. Heterogeneous 13.4 cm lesion in the uterus, concerning for known uterine malignancy. Comparison with any prior outside examination and/or pelvic MRI with IV contrast may be useful further evaluation. 2. Macroscopic fat containing left adnexal lesion, likely a dermoid. 3. There is retroperitoneal, iliac chain, and pelvic lymphadenopathy. 4. Hepatic steatosis. Ill-defined hypoattenuating lesion in the left hepatic lobe is indeterminate. Hepatic protocol MRI or CT is recommended for further evaluation.  5. Innumerable pulmonary nodules with lymphadenopathy in the lower mediastinum, concerning for metastatic disease. 6. 1.8 cm hyperattenuating left renal lesion is indeterminate. Adrenal protocol MRI or CT is recommended for further evaluation. 7. Wall thickening in the distal colon/rectum. This could be seen with underdistention, but colitis would be difficult to exclude. Clinical correlation is recommended. 8. Small ascites. 9. There are few small lucent lesions in the lumbar spine, nonspecific. MRI could provide further information. XR CHEST PORTABLE    Result Date: 8/9/2022  EXAMINATION: ONE XRAY VIEW OF THE CHEST 8/9/2022 7:11 pm COMPARISON: 01/20/2010 and 04/23/2008 HISTORY: ORDERING SYSTEM PROVIDED HISTORY: shortness of breath TECHNOLOGIST PROVIDED HISTORY: shortness of breath Reason for Exam: PT CO chronic SOB that has gotten worse over the last several days. FINDINGS: There are innumerable pulmonary nodules measuring up to 2 cm stable to mildly increased from 01/20/2010. There is suspicion of superimposed mild acute right basilar airspace disease with ill definition of the lateral right hemidiaphragm and right costophrenic angle. No pneumothorax or significant pleural effusion. Heart size is unchanged. Mild right basilar airspace disease, possibly pneumonia. Innumerable pulmonary nodules measuring up to 2 cm stable to slightly increased from 01/20/2010. Clinical history supplied for the study performed 12 years ago was metastatic lung cancer. Correlate clinically. US RETROPERITONEAL COMPLETE    Result Date: 8/19/2022  EXAMINATION: ULTRASOUND OF THE KIDNEYS 8/15/2022 1:42 pm COMPARISON: CT 08/10/2022 HISTORY: ORDERING SYSTEM PROVIDED HISTORY: right side/flank pain TECHNOLOGIST PROVIDED HISTORY: right side/flank pain Follow-up exam FINDINGS: The right kidney measures 11.9 cm in length and the left kidney measures 11.4 cm in length. Kidneys demonstrate normal cortical echogenicity. No hydronephrosis or intrarenal stones.   Hyperdense lesion seen on recent CT within the left kidney is not visualized on this exam.     1. No hydronephrosis. 2. Hyperdense lesion seen within the left kidney on recent CT is not well seen on this exam.  Recommend dedicated renal CT or MRI. CT Lower Extremity Right WO Contrast    Result Date: 2022  [facility] Department of Radiology [place] [address], [place] [phone]    ========================================================================== Patient Name: Angela Fitzpatrick : 1950 Sex: F Age: Race: Black MRN: [id number] Pt. Location:  Patient Status: O Visit #: [id number] Ordered Date: 2022 1:15:00 PM Completed Date: 2022 02:16 PM Requesting Provider: Iris Humphreys Attending Provider: Iris Humphreys Report Copy To: Fernie Ray Signs ^ Symptoms: M70.272Y Oth fracture of upper and lower end of right fibula, init I10 History: Socorro Delgado [id number] approved with brooke holden 22 to 10/10/22 proc [id number] passed med nec Comments: Exam: CT LOWER EXTREMITY WO CONTRAST RIGHT Accession #: [id number] ========================================================================== CT LOWER EXTREMITY WO CONTRAST RIGHT  2022 2:16 PM  CLINICAL INDICATIONS: S82.831A Oth fracture of upper and lower end of right fibula, init I10  TECHNOLOGIST COMMENTS: check fracture  QUESTION FOR THE RADIOLOGIST:  PROTOCOL: Axial CT images of the extremity were obtained without IV contrast.  TECHNIQUE: Multidetector CT axial slices of the without IV contrast. Multiplanar reformats were performed and viewed on a separate workstation and reviewed to further define anatomy and possible pathology. All CT scans at this facility use dose modulation, iterative reconstruction, and/or weight based dosing when appropriate to reduce radiation dose to as low as reasonably achievable  COMPARISON: None. FINDINGS:  Question of lucency within the midshaft fibula.  However, and axial images, the images appear sclerotic suggesting remote etiology. There is no surrounding edema. Fluid and fat stranding throughout the subcutaneous fat may represent vascular insufficiency or cellulitis  IMPRESSION: No convincing acute bony abnormality   Electronically signed: Frederick Tavares. Transcribed by: , User Resident: Electronically Signed Daylin Bryan @ 08/26/2022 02:32 PM       Impression:  Pelvic mass/abdominal lymphadenopathy  Abdominal pain related to above  Anemia  Vaginal bleed, likely related to progressive endometrial cancer  B/L PE  Pulmonary nodules     Plan:  I had a detailed discussion with the patient and personally went over results of lab work-up imaging studies and other relevant clinical data. Reviewed records of hospitalization. Reviewed records from outside facility  Patient denies any previous biopsy. We will refer patient to IR for biopsy. We will also refer patient to gynecological oncology. Based on clinical presentation and imaging studies I believe patient has a gynecological primary which has metastasized most likely endometrial primary. Patient has missed previous medical appointment. Patient was counseled on medical compliance. Patient is reassuring is that she will be compliant. Given gynecology malignancy and bilateral pulmonary embolism patient is at high risk of venous thromboembolism. Patient denies any more bleeding. I will start patient on low-dose Xarelto 10 mg and escalate to therapeutic dose depending upon tolerance. Patient case was also discussed with cancer navigator nurse  We will see patient back in office with results of the biopsy. Robles Peña MD    I spent more than 40 minutes examining, evaluating, reviewing data, counseling the patient and coordinating care.   Greater than 50% of time was spent face-to-face with the patient this note is created with the assistance of a speech recognition program.  While intending to generate a document that actually reflects the content of the visit, the document can still have some errors including those of syntax and sound a like substitutions which may escape proof reading. It such instances, actual meaning can be extrapolated by contextual diversion.

## 2022-09-08 ENCOUNTER — TELEPHONE (OUTPATIENT)
Dept: INTERNAL MEDICINE | Age: 72
End: 2022-09-08

## 2022-09-08 ENCOUNTER — TELEPHONE (OUTPATIENT)
Dept: ONCOLOGY | Age: 72
End: 2022-09-08

## 2022-09-08 NOTE — TELEPHONE ENCOUNTER
Name: Anna Marie Villanueva  : 1950  MRN: [id number]    Oncology Navigation Follow-Up Note    Contact Type:  Telephone    Notes: Attempted to contact pt, no answer, VM left requested contact writer. Will continue to follow.     Electronically signed by Marquis Orlando RN on 2022 at 3:40 PM

## 2022-09-08 NOTE — LETTER
[address]  [address] [facility] [address]  Phone: [phone]  Fax: [phone]    David Thompson MD        September 8, 2022     Patient: Anna Marie Mckeon   YOB: 1950   Date of Visit: 9/8/2022       To Whom It May Concern: It is my medical opinion that Anna Marie Villanueva is unable to climb flight of stairs due to underlying medical condition. She should be placed in an apartment on the first floor. If you have any questions or concerns, please don't hesitate to call.     Sincerely,        David Thompson MD

## 2022-09-08 NOTE — TELEPHONE ENCOUNTER
Pt calling she is currently staying at a nursing home and will be released in a couple of weeks. Pt needs a letter to state that she needs to be on the first floor because when she walks she gets SOB really fast. Pt was last seen as a VV on 3/1/22.  Pt can be reached at [phone]

## 2022-09-13 ENCOUNTER — HOSPITAL ENCOUNTER (OUTPATIENT)
Age: 72
Setting detail: SPECIMEN
Discharge: HOME | End: 2022-09-13

## 2022-09-13 LAB
HCT VFR BLD CALC: 28.8 % (ref 36.3–47.1)
HEMOGLOBIN: 8.4 G/DL (ref 11.9–15.1)

## 2022-09-13 PROCEDURE — 85018 HEMOGLOBIN: CPT

## 2022-09-13 PROCEDURE — 36415 COLL VENOUS BLD VENIPUNCTURE: CPT

## 2022-09-13 PROCEDURE — 85014 HEMATOCRIT: CPT

## 2022-09-13 PROCEDURE — P9603 ONE-WAY ALLOW PRORATED MILES: HCPCS

## 2022-09-23 ENCOUNTER — TELEPHONE (OUTPATIENT)
Dept: ONCOLOGY | Age: 72
End: 2022-09-23

## 2022-09-23 NOTE — TELEPHONE ENCOUNTER
Name: Anna Marie Villanueva  : 1950  MRN: [id number]    Oncology Navigation Follow-Up Note    Contact Type:  Telephone    Notes: Attempted to contact pt for f/u call, no answer, VM left requested pt contact writer @ [phone]. Will continue to follow.     Electronically signed by Tilford Schlatter, RN on 2022 at 3:23 PM
[phone]

## 2022-09-27 ENCOUNTER — HOSPITAL ENCOUNTER (OUTPATIENT)
Age: 72
Setting detail: SPECIMEN
Discharge: HOME | End: 2022-09-27

## 2022-09-27 LAB
HCT VFR BLD CALC: 29.4 % (ref 36.3–47.1)
HEMOGLOBIN: 9 G/DL (ref 11.9–15.1)

## 2022-09-27 PROCEDURE — 85018 HEMOGLOBIN: CPT

## 2022-09-27 PROCEDURE — P9603 ONE-WAY ALLOW PRORATED MILES: HCPCS

## 2022-09-27 PROCEDURE — 85014 HEMATOCRIT: CPT

## 2022-09-27 PROCEDURE — 36415 COLL VENOUS BLD VENIPUNCTURE: CPT

## 2022-10-07 ENCOUNTER — TELEPHONE (OUTPATIENT)
Dept: ONCOLOGY | Age: 72
End: 2022-10-07

## 2022-10-07 ENCOUNTER — HOSPITAL ENCOUNTER (OUTPATIENT)
Age: 72
Setting detail: SPECIMEN
Discharge: HOME | End: 2022-10-07

## 2022-10-07 LAB
ANION GAP SERPL CALCULATED.3IONS-SCNC: 9 MMOL/L (ref 9–17)
BUN BLDV-MCNC: 9 MG/DL (ref 8–23)
CALCIUM SERPL-MCNC: 9.4 MG/DL (ref 8.6–10.4)
CHLORIDE BLD-SCNC: 95 MMOL/L (ref 98–107)
CO2: 28 MMOL/L (ref 20–31)
CREAT SERPL-MCNC: 0.56 MG/DL (ref 0.5–0.9)
GFR SERPL CREATININE-BSD FRML MDRD: >60 ML/MIN/1.73M2
GLUCOSE BLD-MCNC: 83 MG/DL (ref 70–99)
HCT VFR BLD CALC: 29 % (ref 36.3–47.1)
HEMOGLOBIN: 8.7 G/DL (ref 11.9–15.1)
MCH RBC QN AUTO: 22 PG (ref 25.2–33.5)
MCHC RBC AUTO-ENTMCNC: 30 G/DL (ref 28.4–34.8)
MCV RBC AUTO: 73.4 FL (ref 82.6–102.9)
NRBC AUTOMATED: 0 PER 100 WBC
PDW BLD-RTO: 23.5 % (ref 11.8–14.4)
PLATELET # BLD: 300 K/UL (ref 138–453)
PMV BLD AUTO: 10.1 FL (ref 8.1–13.5)
POTASSIUM SERPL-SCNC: 3.9 MMOL/L (ref 3.7–5.3)
RBC # BLD: 3.95 M/UL (ref 3.95–5.11)
SODIUM BLD-SCNC: 132 MMOL/L (ref 135–144)
WBC # BLD: 8.3 K/UL (ref 3.5–11.3)

## 2022-10-07 PROCEDURE — 85027 COMPLETE CBC AUTOMATED: CPT

## 2022-10-07 PROCEDURE — 36415 COLL VENOUS BLD VENIPUNCTURE: CPT

## 2022-10-07 PROCEDURE — P9603 ONE-WAY ALLOW PRORATED MILES: HCPCS

## 2022-10-07 PROCEDURE — 80048 BASIC METABOLIC PNL TOTAL CA: CPT

## 2022-10-07 NOTE — TELEPHONE ENCOUNTER
Erika BOJORQUEZ, RN Navigator. I called patient to see if she wants to reschedule Med Onc and Gyn Onc appointments per [place] request. Phone went to directly to voice mail with message sating voice mail is full. Swapna mendez. Per [place] mailed D/C navigation letter.

## 2022-10-07 NOTE — LETTER
[address] [facility]  Matt Jordan Kacy      10/7/2022      Anna Marie [phone]    Dear June Daryle Horseman:    As your Oncology Nurse Navigator, it is my responsibility to assist you in navigating your way through your oncology treatment. Unfortunately, I have not been able to reach you to provide you this assistance. Because I have not received any response from you, I will no longer be making attempts to contact you. I do encourage you to reach out to me at anytime that I can be of assistance in the coordination of your cancer care. Kindest Regards,    [address] [facility] Nurse Navigator  [address], [facility]  Cell: [phone]  Email:  [email or web address]

## 2022-10-27 NOTE — LETTER
[address]  [address] [facility] [address] [address]  Phone: [phone]  Fax: [phone]    Meli Mccullough MD        October 31, 2022 [address] [place] [address]      Dear Anna Marie: We have tried to contact you on numerous occasions in regards to scheduling an appointment and have been unable to reach you. If you could contact us at [phone] (option 3 then option 1) your earliest convenience we would greatly appreciate it. If you have any questions or concerns, please don't hesitate to call.     Sincerely,        Meli Mccullough MD

## 2022-11-01 ENCOUNTER — HOSPITAL ENCOUNTER (OUTPATIENT)
Age: 72
Setting detail: SPECIMEN
Discharge: HOME | End: 2022-11-01
Payer: MEDICAID

## 2022-11-01 LAB — VITAMIN D 25-HYDROXY: 19.2 NG/ML

## 2022-11-01 PROCEDURE — 82306 VITAMIN D 25 HYDROXY: CPT

## 2022-11-01 PROCEDURE — 36415 COLL VENOUS BLD VENIPUNCTURE: CPT

## 2022-11-01 PROCEDURE — P9603 ONE-WAY ALLOW PRORATED MILES: HCPCS

## 2022-11-02 ENCOUNTER — HOSPITAL ENCOUNTER (OUTPATIENT)
Age: 72
Setting detail: SPECIMEN
Discharge: HOME | End: 2022-11-02
Payer: MEDICAID

## 2022-11-02 LAB — VITAMIN D 25-HYDROXY: 14.4 NG/ML

## 2022-11-02 PROCEDURE — 82306 VITAMIN D 25 HYDROXY: CPT

## 2022-11-02 PROCEDURE — 36415 COLL VENOUS BLD VENIPUNCTURE: CPT

## 2022-11-21 ENCOUNTER — HOSPITAL ENCOUNTER (OUTPATIENT)
Age: 72
Setting detail: SPECIMEN
Discharge: HOME | End: 2022-11-21

## 2022-11-21 LAB
ABSOLUTE EOS #: <0.03 K/UL (ref 0–0.44)
ABSOLUTE IMMATURE GRANULOCYTE: 0.29 K/UL (ref 0–0.3)
ABSOLUTE LYMPH #: 0.9 K/UL (ref 1.1–3.7)
ABSOLUTE MONO #: 1.4 K/UL (ref 0.1–1.2)
ALBUMIN SERPL-MCNC: 2.6 G/DL (ref 3.5–5.2)
ALP BLD-CCNC: 71 U/L (ref 35–104)
ALT SERPL-CCNC: <5 U/L (ref 5–33)
ANION GAP SERPL CALCULATED.3IONS-SCNC: 11 MMOL/L (ref 9–17)
AST SERPL-CCNC: 14 U/L
BASOPHILS # BLD: 0 % (ref 0–2)
BASOPHILS ABSOLUTE: 0.05 K/UL (ref 0–0.2)
BILIRUB SERPL-MCNC: 0.4 MG/DL (ref 0.3–1.2)
BUN BLDV-MCNC: 53 MG/DL (ref 8–23)
BUN/CREAT BLD: 30 (ref 9–20)
CALCIUM SERPL-MCNC: 10 MG/DL (ref 8.6–10.4)
CHLORIDE BLD-SCNC: 97 MMOL/L (ref 98–107)
CO2: 24 MMOL/L (ref 20–31)
CREAT SERPL-MCNC: 1.79 MG/DL (ref 0.5–0.9)
EOSINOPHILS RELATIVE PERCENT: 0 % (ref 1–4)
GFR SERPL CREATININE-BSD FRML MDRD: 30 ML/MIN/1.73M2
GLUCOSE FASTING: 99 MG/DL (ref 70–99)
HCT VFR BLD CALC: 27.4 % (ref 36.3–47.1)
HEMOGLOBIN: 8.2 G/DL (ref 11.9–15.1)
IMMATURE GRANULOCYTES: 2 %
LYMPHOCYTES # BLD: 6 % (ref 24–43)
MCH RBC QN AUTO: 22.6 PG (ref 25.2–33.5)
MCHC RBC AUTO-ENTMCNC: 29.9 G/DL (ref 28.4–34.8)
MCV RBC AUTO: 75.5 FL (ref 82.6–102.9)
MONOCYTES # BLD: 9 % (ref 3–12)
NRBC AUTOMATED: 0 PER 100 WBC
PDW BLD-RTO: 18.1 % (ref 11.8–14.4)
PLATELET # BLD: 383 K/UL (ref 138–453)
PMV BLD AUTO: 8.7 FL (ref 8.1–13.5)
POTASSIUM SERPL-SCNC: 5.2 MMOL/L (ref 3.7–5.3)
RBC # BLD: 3.63 M/UL (ref 3.95–5.11)
RBC # BLD: ABNORMAL 10*6/UL
SEG NEUTROPHILS: 83 % (ref 36–65)
SEGMENTED NEUTROPHILS ABSOLUTE COUNT: 12.99 K/UL (ref 1.5–8.1)
SODIUM BLD-SCNC: 132 MMOL/L (ref 135–144)
TOTAL PROTEIN: 7.3 G/DL (ref 6.4–8.3)
WBC # BLD: 15.7 K/UL (ref 3.5–11.3)

## 2022-11-21 PROCEDURE — 80053 COMPREHEN METABOLIC PANEL: CPT

## 2022-11-21 PROCEDURE — 36415 COLL VENOUS BLD VENIPUNCTURE: CPT

## 2022-11-21 PROCEDURE — 85025 COMPLETE CBC W/AUTO DIFF WBC: CPT

## 2022-11-29 ENCOUNTER — APPOINTMENT (OUTPATIENT)
Dept: GENERAL RADIOLOGY | Age: 72
DRG: 720 | End: 2022-11-29
Payer: MEDICAID

## 2022-11-29 ENCOUNTER — HOSPITAL ENCOUNTER (OUTPATIENT)
Age: 72
Setting detail: SPECIMEN
Discharge: HOME | DRG: 720 | End: 2022-11-29
Payer: MEDICAID

## 2022-11-29 ENCOUNTER — HOSPITAL ENCOUNTER (INPATIENT)
Age: 72
LOS: 1 days | Discharge: OTHER INSTITUTION | DRG: 720 | End: 2022-11-30
Attending: EMERGENCY MEDICINE | Admitting: INTERNAL MEDICINE
Payer: MEDICAID

## 2022-11-29 DIAGNOSIS — J18.9 PNEUMONIA OF RIGHT LOWER LOBE DUE TO INFECTIOUS ORGANISM: Primary | ICD-10-CM

## 2022-11-29 DIAGNOSIS — N30.00 ACUTE CYSTITIS WITHOUT HEMATURIA: ICD-10-CM

## 2022-11-29 DIAGNOSIS — N17.9 ACUTE KIDNEY INJURY (HCC): ICD-10-CM

## 2022-11-29 DIAGNOSIS — E86.0 DEHYDRATION: ICD-10-CM

## 2022-11-29 DIAGNOSIS — C54.1 ENDOMETRIAL CANCER (HCC): ICD-10-CM

## 2022-11-29 PROBLEM — A41.9 SEPSIS (HCC): Status: ACTIVE | Noted: 2022-01-01

## 2022-11-29 LAB
ABSOLUTE EOS #: 0 K/UL (ref 0–0.4)
ABSOLUTE LYMPH #: 0.44 K/UL (ref 1–4.8)
ABSOLUTE MONO #: 1.09 K/UL (ref 0.1–1.3)
ALBUMIN SERPL-MCNC: 2.5 G/DL (ref 3.5–5.2)
ALP BLD-CCNC: 74 U/L (ref 35–104)
ALT SERPL-CCNC: 6 U/L (ref 5–33)
ANION GAP SERPL CALCULATED.3IONS-SCNC: 17 MMOL/L (ref 9–17)
AST SERPL-CCNC: 18 U/L
BACTERIA: ABNORMAL
BASOPHILS # BLD: 0 % (ref 0–2)
BASOPHILS ABSOLUTE: 0 K/UL (ref 0–0.2)
BILIRUB SERPL-MCNC: 0.8 MG/DL (ref 0.3–1.2)
BILIRUBIN URINE: ABNORMAL
BUN BLDV-MCNC: 93 MG/DL (ref 8–23)
CALCIUM SERPL-MCNC: 9.6 MG/DL (ref 8.6–10.4)
CASTS UA: ABNORMAL /LPF
CHLORIDE BLD-SCNC: 98 MMOL/L (ref 98–107)
CHP ED QC CHECK: NORMAL
CHP ED QC CHECK: NORMAL
CO2: 16 MMOL/L (ref 20–31)
COLOR: ABNORMAL
CREAT SERPL-MCNC: 3.02 MG/DL (ref 0.5–0.9)
EOSINOPHILS RELATIVE PERCENT: 0 % (ref 0–4)
EPITHELIAL CELLS UA: ABNORMAL /HPF
GFR SERPL CREATININE-BSD FRML MDRD: 16 ML/MIN/1.73M2
GLUCOSE BLD-MCNC: 145 MG/DL
GLUCOSE BLD-MCNC: 145 MG/DL (ref 65–105)
GLUCOSE BLD-MCNC: 76 MG/DL
GLUCOSE BLD-MCNC: 76 MG/DL (ref 65–105)
GLUCOSE BLD-MCNC: 82 MG/DL (ref 70–99)
GLUCOSE URINE: NEGATIVE
HCT VFR BLD CALC: 25.3 % (ref 36–46)
HEMOGLOBIN: 7.9 G/DL (ref 12–16)
INFLUENZA A: NOT DETECTED
INFLUENZA B: NOT DETECTED
KETONES, URINE: ABNORMAL
LACTIC ACID, SEPSIS: 2.1 MMOL/L (ref 0.5–1.9)
LACTIC ACID, SEPSIS: 2.2 MMOL/L (ref 0.5–1.9)
LEUKOCYTE ESTERASE, URINE: ABNORMAL
LYMPHOCYTES # BLD: 2 % (ref 24–44)
MCH RBC QN AUTO: 22.6 PG (ref 26–34)
MCHC RBC AUTO-ENTMCNC: 31.2 G/DL (ref 31–37)
MCV RBC AUTO: 72.3 FL (ref 80–100)
MONOCYTES # BLD: 5 % (ref 1–7)
MORPHOLOGY: ABNORMAL
NITRITE, URINE: NEGATIVE
PDW BLD-RTO: 19.9 % (ref 11.5–14.9)
PH UA: 5 (ref 5–8)
PLATELET # BLD: 383 K/UL (ref 150–450)
PMV BLD AUTO: 6.2 FL (ref 6–12)
POTASSIUM SERPL-SCNC: 5.6 MMOL/L (ref 3.7–5.3)
PRO-BNP: 409 PG/ML
PROCALCITONIN: 4.5 NG/ML
PROTEIN UA: ABNORMAL
RBC # BLD: 3.5 M/UL (ref 4–5.2)
RBC UA: ABNORMAL /HPF
SARS-COV-2 RNA, RT PCR: NOT DETECTED
SEG NEUTROPHILS: 93 % (ref 36–66)
SEGMENTED NEUTROPHILS ABSOLUTE COUNT: 20.27 K/UL (ref 1.3–9.1)
SODIUM BLD-SCNC: 131 MMOL/L (ref 135–144)
SOURCE: NORMAL
SPECIFIC GRAVITY UA: 1.02 (ref 1–1.03)
SPECIMEN DESCRIPTION: NORMAL
TOTAL PROTEIN: 7.3 G/DL (ref 6.4–8.3)
TROPONIN, HIGH SENSITIVITY: 76 NG/L (ref 0–14)
TROPONIN, HIGH SENSITIVITY: 83 NG/L (ref 0–14)
TURBIDITY: ABNORMAL
URINE HGB: ABNORMAL
UROBILINOGEN, URINE: NORMAL
WBC # BLD: 21.8 K/UL (ref 3.5–11)
WBC UA: ABNORMAL /HPF

## 2022-11-29 PROCEDURE — 87086 URINE CULTURE/COLONY COUNT: CPT

## 2022-11-29 PROCEDURE — 36415 COLL VENOUS BLD VENIPUNCTURE: CPT

## 2022-11-29 PROCEDURE — 82805 BLOOD GASES W/O2 SATURATION: CPT

## 2022-11-29 PROCEDURE — 2060000000 HC ICU INTERMEDIATE R&B

## 2022-11-29 PROCEDURE — 96375 TX/PRO/DX INJ NEW DRUG ADDON: CPT

## 2022-11-29 PROCEDURE — 96367 TX/PROPH/DG ADDL SEQ IV INF: CPT

## 2022-11-29 PROCEDURE — 99285 EMERGENCY DEPT VISIT HI MDM: CPT

## 2022-11-29 PROCEDURE — 6370000000 HC RX 637 (ALT 250 FOR IP): Performed by: STUDENT IN AN ORGANIZED HEALTH CARE EDUCATION/TRAINING PROGRAM

## 2022-11-29 PROCEDURE — P9603 ONE-WAY ALLOW PRORATED MILES: HCPCS

## 2022-11-29 PROCEDURE — 87040 BLOOD CULTURE FOR BACTERIA: CPT

## 2022-11-29 PROCEDURE — 6360000002 HC RX W HCPCS: Performed by: STUDENT IN AN ORGANIZED HEALTH CARE EDUCATION/TRAINING PROGRAM

## 2022-11-29 PROCEDURE — 81001 URINALYSIS AUTO W/SCOPE: CPT

## 2022-11-29 PROCEDURE — 96374 THER/PROPH/DIAG INJ IV PUSH: CPT

## 2022-11-29 PROCEDURE — 84145 PROCALCITONIN (PCT): CPT

## 2022-11-29 PROCEDURE — 2580000003 HC RX 258: Performed by: STUDENT IN AN ORGANIZED HEALTH CARE EDUCATION/TRAINING PROGRAM

## 2022-11-29 PROCEDURE — 87636 SARSCOV2 & INF A&B AMP PRB: CPT

## 2022-11-29 PROCEDURE — 83880 ASSAY OF NATRIURETIC PEPTIDE: CPT

## 2022-11-29 PROCEDURE — 85025 COMPLETE CBC W/AUTO DIFF WBC: CPT

## 2022-11-29 PROCEDURE — 82947 ASSAY GLUCOSE BLOOD QUANT: CPT

## 2022-11-29 PROCEDURE — 84484 ASSAY OF TROPONIN QUANT: CPT

## 2022-11-29 PROCEDURE — 96365 THER/PROPH/DIAG IV INF INIT: CPT

## 2022-11-29 PROCEDURE — 83605 ASSAY OF LACTIC ACID: CPT

## 2022-11-29 PROCEDURE — 93005 ELECTROCARDIOGRAM TRACING: CPT | Performed by: STUDENT IN AN ORGANIZED HEALTH CARE EDUCATION/TRAINING PROGRAM

## 2022-11-29 PROCEDURE — 96361 HYDRATE IV INFUSION ADD-ON: CPT

## 2022-11-29 PROCEDURE — 71045 X-RAY EXAM CHEST 1 VIEW: CPT

## 2022-11-29 PROCEDURE — 80053 COMPREHEN METABOLIC PANEL: CPT

## 2022-11-29 RX ORDER — FENTANYL CITRATE 0.05 MG/ML
25 INJECTION, SOLUTION INTRAMUSCULAR; INTRAVENOUS ONCE
Status: COMPLETED | OUTPATIENT
Start: 2022-11-29 | End: 2022-11-29

## 2022-11-29 RX ORDER — LINEZOLID 2 MG/ML
600 INJECTION, SOLUTION INTRAVENOUS EVERY 12 HOURS
Status: DISCONTINUED | OUTPATIENT
Start: 2022-11-29 | End: 2022-11-30 | Stop reason: HOSPADM

## 2022-11-29 RX ORDER — SODIUM CHLORIDE, SODIUM LACTATE, POTASSIUM CHLORIDE, AND CALCIUM CHLORIDE .6; .31; .03; .02 G/100ML; G/100ML; G/100ML; G/100ML
30 INJECTION, SOLUTION INTRAVENOUS ONCE
Status: COMPLETED | OUTPATIENT
Start: 2022-11-29 | End: 2022-11-29

## 2022-11-29 RX ADMIN — FENTANYL CITRATE 25 MCG: 0.05 INJECTION, SOLUTION INTRAMUSCULAR; INTRAVENOUS at 22:27

## 2022-11-29 RX ADMIN — LINEZOLID 600 MG: 600 INJECTION, SOLUTION INTRAVENOUS at 23:00

## 2022-11-29 RX ADMIN — SODIUM CHLORIDE, POTASSIUM CHLORIDE, SODIUM LACTATE AND CALCIUM CHLORIDE 1710 ML: 600; 310; 30; 20 INJECTION, SOLUTION INTRAVENOUS at 20:54

## 2022-11-29 RX ADMIN — PIPERACILLIN AND TAZOBACTAM 4500 MG: 4; .5 INJECTION, POWDER, FOR SOLUTION INTRAVENOUS at 22:31

## 2022-11-29 RX ADMIN — DEXTROSE MONOHYDRATE 250 ML: 100 INJECTION, SOLUTION INTRAVENOUS at 22:53

## 2022-11-29 RX ADMIN — INSULIN HUMAN 6 UNITS: 100 INJECTION, SOLUTION PARENTERAL at 22:55

## 2022-11-29 ASSESSMENT — PAIN SCALES - GENERAL: PAINLEVEL_OUTOF10: 10

## 2022-11-30 ENCOUNTER — APPOINTMENT (OUTPATIENT)
Dept: CT IMAGING | Age: 72
DRG: 720 | End: 2022-11-30
Payer: MEDICAID

## 2022-11-30 VITALS
DIASTOLIC BLOOD PRESSURE: 45 MMHG | OXYGEN SATURATION: 96 % | TEMPERATURE: 98.1 F | SYSTOLIC BLOOD PRESSURE: 102 MMHG | WEIGHT: 188.49 LBS | BODY MASS INDEX: 31.37 KG/M2 | HEART RATE: 120 BPM | RESPIRATION RATE: 20 BRPM

## 2022-11-30 PROBLEM — J18.9 PNEUMONIA OF RIGHT LOWER LOBE DUE TO INFECTIOUS ORGANISM: Status: ACTIVE | Noted: 2022-11-30

## 2022-11-30 LAB
ABSOLUTE EOS #: 0 K/UL (ref 0–0.4)
ABSOLUTE LYMPH #: 1.73 K/UL (ref 1–4.8)
ABSOLUTE MONO #: 1.16 K/UL (ref 0.1–1.3)
ALBUMIN SERPL-MCNC: 2.1 G/DL (ref 3.5–5.2)
ALP BLD-CCNC: 68 U/L (ref 35–104)
ALT SERPL-CCNC: 6 U/L (ref 5–33)
ANION GAP SERPL CALCULATED.3IONS-SCNC: 15 MMOL/L (ref 9–17)
AST SERPL-CCNC: 19 U/L
BASOPHILS # BLD: 0 % (ref 0–2)
BASOPHILS ABSOLUTE: 0 K/UL (ref 0–0.2)
BILIRUB SERPL-MCNC: 0.7 MG/DL (ref 0.3–1.2)
BUN BLDV-MCNC: 91 MG/DL (ref 8–23)
CALCIUM SERPL-MCNC: 9.1 MG/DL (ref 8.6–10.4)
CARBOXYHEMOGLOBIN: 2.6 %
CARBOXYHEMOGLOBIN: 5.3 %
CHLORIDE BLD-SCNC: 102 MMOL/L (ref 98–107)
CO2: 17 MMOL/L (ref 20–31)
CREAT SERPL-MCNC: 2.92 MG/DL (ref 0.5–0.9)
CULTURE: NORMAL
EKG ATRIAL RATE: 136 BPM
EKG P AXIS: 18 DEGREES
EKG P-R INTERVAL: 122 MS
EKG Q-T INTERVAL: 300 MS
EKG QRS DURATION: 72 MS
EKG QTC CALCULATION (BAZETT): 451 MS
EKG R AXIS: 50 DEGREES
EKG T AXIS: 7 DEGREES
EKG VENTRICULAR RATE: 136 BPM
EOSINOPHILS RELATIVE PERCENT: 0 % (ref 0–4)
GFR SERPL CREATININE-BSD FRML MDRD: 16 ML/MIN/1.73M2
GLUCOSE BLD-MCNC: 113 MG/DL (ref 70–99)
GLUCOSE BLD-MCNC: 128 MG/DL (ref 65–105)
GLUCOSE BLD-MCNC: 164 MG/DL (ref 65–105)
HCO3 VENOUS: 17.9 MMOL/L (ref 24–30)
HCO3 VENOUS: 18.3 MMOL/L (ref 24–30)
HCT VFR BLD CALC: 24.3 % (ref 36–46)
HEMOGLOBIN: 7.4 G/DL (ref 12–16)
INR BLD: 1.2
LACTIC ACID: 1.8 MMOL/L (ref 0.5–2.2)
LYMPHOCYTES # BLD: 6 % (ref 24–44)
MCH RBC QN AUTO: 22.3 PG (ref 26–34)
MCHC RBC AUTO-ENTMCNC: 30.4 G/DL (ref 31–37)
MCV RBC AUTO: 73.3 FL (ref 80–100)
METHEMOGLOBIN: 0.8 %
METHEMOGLOBIN: <0 %
MONOCYTES # BLD: 4 % (ref 1–7)
MORPHOLOGY: ABNORMAL
NEGATIVE BASE EXCESS, VEN: 5.8 MMOL/L (ref 0–2)
NEGATIVE BASE EXCESS, VEN: 6.8 MMOL/L (ref 0–2)
O2 SAT, VEN: 94.6 %
O2 SAT, VEN: 95.9 %
PATIENT TEMP: 37
PATIENT TEMP: 37
PCO2, VEN: 24.6 MM HG (ref 39–55)
PCO2, VEN: 30.7 MM HG (ref 39–55)
PDW BLD-RTO: 20.1 % (ref 11.5–14.9)
PH VENOUS: 7.38 (ref 7.32–7.42)
PH VENOUS: 7.47 (ref 7.32–7.42)
PLATELET # BLD: 362 K/UL (ref 150–450)
PMV BLD AUTO: 6.2 FL (ref 6–12)
PO2, VEN: 143 MM HG (ref 30–50)
PO2, VEN: 181 MM HG (ref 30–50)
POTASSIUM SERPL-SCNC: 5.1 MMOL/L (ref 3.7–5.3)
POTASSIUM SERPL-SCNC: 5.3 MMOL/L (ref 3.7–5.3)
POTASSIUM SERPL-SCNC: 5.8 MMOL/L (ref 3.7–5.3)
PROTHROMBIN TIME: 15.6 SEC (ref 11.8–14.6)
RBC # BLD: 3.31 M/UL (ref 4–5.2)
SEG NEUTROPHILS: 90 % (ref 36–66)
SEGMENTED NEUTROPHILS ABSOLUTE COUNT: 26.01 K/UL (ref 1.3–9.1)
SODIUM BLD-SCNC: 134 MMOL/L (ref 135–144)
SPECIMEN DESCRIPTION: NORMAL
TEXT FOR RESPIRATORY: ABNORMAL
TEXT FOR RESPIRATORY: ABNORMAL
TOTAL PROTEIN: 6.6 G/DL (ref 6.4–8.3)
WBC # BLD: 28.9 K/UL (ref 3.5–11)

## 2022-11-30 PROCEDURE — 99223 1ST HOSP IP/OBS HIGH 75: CPT | Performed by: INTERNAL MEDICINE

## 2022-11-30 PROCEDURE — 2580000003 HC RX 258: Performed by: INTERNAL MEDICINE

## 2022-11-30 PROCEDURE — 6360000002 HC RX W HCPCS: Performed by: NURSE PRACTITIONER

## 2022-11-30 PROCEDURE — 6370000000 HC RX 637 (ALT 250 FOR IP): Performed by: INTERNAL MEDICINE

## 2022-11-30 PROCEDURE — 74176 CT ABD & PELVIS W/O CONTRAST: CPT

## 2022-11-30 PROCEDURE — 93010 ELECTROCARDIOGRAM REPORT: CPT | Performed by: INTERNAL MEDICINE

## 2022-11-30 PROCEDURE — 80053 COMPREHEN METABOLIC PANEL: CPT

## 2022-11-30 PROCEDURE — 85025 COMPLETE CBC W/AUTO DIFF WBC: CPT

## 2022-11-30 PROCEDURE — 6360000002 HC RX W HCPCS: Performed by: STUDENT IN AN ORGANIZED HEALTH CARE EDUCATION/TRAINING PROGRAM

## 2022-11-30 PROCEDURE — 83605 ASSAY OF LACTIC ACID: CPT

## 2022-11-30 PROCEDURE — 70450 CT HEAD/BRAIN W/O DYE: CPT

## 2022-11-30 PROCEDURE — 84132 ASSAY OF SERUM POTASSIUM: CPT

## 2022-11-30 PROCEDURE — 85610 PROTHROMBIN TIME: CPT

## 2022-11-30 PROCEDURE — 82947 ASSAY GLUCOSE BLOOD QUANT: CPT

## 2022-11-30 PROCEDURE — 36415 COLL VENOUS BLD VENIPUNCTURE: CPT

## 2022-11-30 PROCEDURE — 2580000003 HC RX 258: Performed by: NURSE PRACTITIONER

## 2022-11-30 RX ORDER — GABAPENTIN 100 MG/1
100 CAPSULE ORAL 3 TIMES DAILY
Status: ON HOLD | COMMUNITY
End: 2022-11-30 | Stop reason: HOSPADM

## 2022-11-30 RX ORDER — MORPHINE SULFATE 2 MG/ML
2 INJECTION, SOLUTION INTRAMUSCULAR; INTRAVENOUS
Qty: 1 ML | Refills: 0 | Status: SHIPPED | OUTPATIENT
Start: 2022-11-30 | End: 2022-12-03

## 2022-11-30 RX ORDER — SODIUM CHLORIDE 0.9 % (FLUSH) 0.9 %
5-40 SYRINGE (ML) INJECTION EVERY 12 HOURS SCHEDULED
Status: DISCONTINUED | OUTPATIENT
Start: 2022-11-30 | End: 2022-11-30 | Stop reason: HOSPADM

## 2022-11-30 RX ORDER — ACETAMINOPHEN 325 MG/1
650 TABLET ORAL EVERY 6 HOURS PRN
Status: DISCONTINUED | OUTPATIENT
Start: 2022-11-30 | End: 2022-11-30 | Stop reason: HOSPADM

## 2022-11-30 RX ORDER — ONDANSETRON 2 MG/ML
4 INJECTION INTRAMUSCULAR; INTRAVENOUS EVERY 6 HOURS PRN
Status: DISCONTINUED | OUTPATIENT
Start: 2022-11-30 | End: 2022-11-30 | Stop reason: HOSPADM

## 2022-11-30 RX ORDER — SODIUM CHLORIDE 9 MG/ML
INJECTION, SOLUTION INTRAVENOUS PRN
Status: DISCONTINUED | OUTPATIENT
Start: 2022-11-30 | End: 2022-11-30 | Stop reason: HOSPADM

## 2022-11-30 RX ORDER — GABAPENTIN 100 MG/1
100 CAPSULE ORAL 3 TIMES DAILY
Status: DISCONTINUED | OUTPATIENT
Start: 2022-11-30 | End: 2022-11-30 | Stop reason: HOSPADM

## 2022-11-30 RX ORDER — FENTANYL CITRATE 0.05 MG/ML
25 INJECTION, SOLUTION INTRAMUSCULAR; INTRAVENOUS
Status: DISCONTINUED | OUTPATIENT
Start: 2022-11-30 | End: 2022-11-30 | Stop reason: HOSPADM

## 2022-11-30 RX ORDER — SODIUM CHLORIDE 0.9 % (FLUSH) 0.9 %
5-40 SYRINGE (ML) INJECTION PRN
Status: DISCONTINUED | OUTPATIENT
Start: 2022-11-30 | End: 2022-11-30 | Stop reason: HOSPADM

## 2022-11-30 RX ORDER — ERGOCALCIFEROL (VITAMIN D2) 1250 MCG
50000 CAPSULE ORAL WEEKLY
Status: ON HOLD | COMMUNITY
End: 2022-11-30 | Stop reason: HOSPADM

## 2022-11-30 RX ORDER — LORAZEPAM 2 MG/ML
1 INJECTION INTRAMUSCULAR
Qty: 30 EACH | Refills: 0 | Status: SHIPPED | OUTPATIENT
Start: 2022-11-30 | End: 2022-12-03

## 2022-11-30 RX ORDER — SODIUM CHLORIDE 9 MG/ML
INJECTION, SOLUTION INTRAVENOUS CONTINUOUS
Status: DISCONTINUED | OUTPATIENT
Start: 2022-11-30 | End: 2022-11-30

## 2022-11-30 RX ORDER — PANTOPRAZOLE SODIUM 40 MG/1
40 TABLET, DELAYED RELEASE ORAL DAILY
Status: DISCONTINUED | OUTPATIENT
Start: 2022-11-30 | End: 2022-11-30 | Stop reason: HOSPADM

## 2022-11-30 RX ORDER — ACETAMINOPHEN 650 MG/1
650 SUPPOSITORY RECTAL EVERY 6 HOURS PRN
Status: DISCONTINUED | OUTPATIENT
Start: 2022-11-30 | End: 2022-11-30 | Stop reason: HOSPADM

## 2022-11-30 RX ADMIN — FENTANYL CITRATE 25 MCG: 0.05 INJECTION, SOLUTION INTRAMUSCULAR; INTRAVENOUS at 17:16

## 2022-11-30 RX ADMIN — FENTANYL CITRATE 25 MCG: 0.05 INJECTION, SOLUTION INTRAMUSCULAR; INTRAVENOUS at 10:05

## 2022-11-30 RX ADMIN — SODIUM CHLORIDE: 9 INJECTION, SOLUTION INTRAVENOUS at 11:42

## 2022-11-30 RX ADMIN — FENTANYL CITRATE 25 MCG: 0.05 INJECTION, SOLUTION INTRAMUSCULAR; INTRAVENOUS at 07:29

## 2022-11-30 RX ADMIN — LINEZOLID 600 MG: 600 INJECTION, SOLUTION INTRAVENOUS at 11:34

## 2022-11-30 RX ADMIN — FENTANYL CITRATE 25 MCG: 0.05 INJECTION, SOLUTION INTRAMUSCULAR; INTRAVENOUS at 12:38

## 2022-11-30 RX ADMIN — FENTANYL CITRATE 25 MCG: 0.05 INJECTION, SOLUTION INTRAMUSCULAR; INTRAVENOUS at 14:56

## 2022-11-30 RX ADMIN — INSULIN HUMAN 10 UNITS: 100 INJECTION, SOLUTION PARENTERAL at 10:07

## 2022-11-30 RX ADMIN — SODIUM CHLORIDE: 9 INJECTION, SOLUTION INTRAVENOUS at 02:35

## 2022-11-30 RX ADMIN — DEXTROSE MONOHYDRATE 250 ML: 100 INJECTION, SOLUTION INTRAVENOUS at 08:56

## 2022-11-30 RX ADMIN — SODIUM CHLORIDE: 9 INJECTION, SOLUTION INTRAVENOUS at 11:33

## 2022-11-30 RX ADMIN — PIPERACILLIN AND TAZOBACTAM 3375 MG: 3; .375 INJECTION, POWDER, FOR SOLUTION INTRAVENOUS at 11:42

## 2022-11-30 ASSESSMENT — ENCOUNTER SYMPTOMS
SORE THROAT: 0
VOMITING: 0
COUGH: 0
EYE REDNESS: 0
SINUS PRESSURE: 0
CHEST TIGHTNESS: 0
EYE PAIN: 0
RHINORRHEA: 0
EYES NEGATIVE: 1
TROUBLE SWALLOWING: 1
COUGH: 1
CONSTIPATION: 0
ABDOMINAL PAIN: 0
SHORTNESS OF BREATH: 0
COLOR CHANGE: 0
PHOTOPHOBIA: 0
NAUSEA: 1
DIARRHEA: 0
NAUSEA: 0
ALLERGIC/IMMUNOLOGIC NEGATIVE: 1

## 2022-11-30 ASSESSMENT — PAIN SCALES - GENERAL: PAINLEVEL_OUTOF10: 8

## 2022-11-30 NOTE — PROGRESS NOTES
Patient admit to room 2088. Patient transferred to bed via staff. Staff provided incontience care, and oriented patient to room. Patient answers questions at times. Dual skin assessment performed. Patient wounds on buttocks washed with soap and water and mepilexs placed. Son at bedside. Nurse updated him and patient on plan. Educated on call light. Call light in reach, bed alarm on.

## 2022-11-30 NOTE — CARE COORDINATION
CASE MANAGEMENT NOTE:    Admission Date:  11/29/2022 June Michelle Esposito is a 67 y.o.  female    Admitted for : Dehydration [E86.0]  Acute kidney injury ([facility] [address] [N17.9]  Acute cystitis without hematuria [N30.00]  Sepsis ([facility] [address] [A41.9]  Pneumonia of right lower lobe due to infectious organism [J18.9]    Writer reviewed chart. PCP:  Dr. Sherri Saldivar:  Medicaid      Current Residence/ Living Arrangements:  in [facility].   Hospice [address] [place] [address] meeting with daughter, Kate Mckeon at 2:30pm.             Does patient go to outpatient dialysis: No    Discharge Plan:  Hospice                 Electronically signed by: Miracle Lebron RN on 11/30/2022 at 11:08 AM

## 2022-11-30 NOTE — CONSULTS
Department of Internal Medicine  Nephrology Shayy Kirkland MD   Consult Note    Reason for consultation: Management of acute kidney injury. Consulting physician: Enoc Thompson MD.    History of present illness: This is a 67 y.o. 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 She has a renal mass as well as multiple pulmonary lesions, which was biopsied in November 2016 but was negative for malignancy. She has been in and out of the SP tube for the last 6 months and remains confused today and history was obtained primarily by chart review and from son who was present during this encounter. Count of the abdomen showed a posterior left renal mass as well as multiple hepatic and pulmonary nodules suspicious for metastatic process. Serum creatinine was 0.56 mg/dL on 10/7/2022. However, laboratory studies at presentation yesterday were remarkable for elevated BUN/creatinine 93/3.02 mg/dL; Sodium 131 mmol/L; potassium 5.6 mmol/L and anion gap 16 mmol/L. Nephrology consultation was placed for management of acute kidney injury. Patient has no known allergies.     Past Medical History:   Diagnosis Date    Anxiety and depression     Anxiety and depression     Endometrial cancer ([address] 8/13/2022    Fibroid     Hyperlipidemia     Hypertension     Leiomyomatosis     Lung nodule     Obesity     Osteoarthritis     Radiculopathy, cervical     Renal mass     Restrictive lung disease     Retinal abnormality - diabetes related     Type II or unspecified type diabetes mellitus without mention of complication, not stated as uncontrolled      Scheduled Meds:   sodium chloride flush  5-40 mL IntraVENous 2 times per day    piperacillin-tazobactam (ZOSYN) 3375 mg in dextrose 5% IVPB extended infusion (mini-bag)  3,375 mg IntraVENous Q12H    gabapentin  100 mg Oral TID    pantoprazole  40 mg Oral Daily    linezolid  600 mg IntraVENous Q12H     Continuous Infusions:   sodium chloride 5 mL/hr at 22 1142    sodium chloride 100 mL/hr at 22 1133     PRN Meds:.sodium chloride flush, sodium chloride, acetaminophen **OR** acetaminophen, fentanNYL, ondansetron    Family History   Problem Relation Age of Onset    High Blood Pressure Mother     Diabetes Mother     Heart Disease Mother     Cancer Father      Social History     Socioeconomic History    Marital status: Single   Tobacco Use    Smoking status: Never    Smokeless tobacco: Never   Vaping Use    Vaping Use: Never used   Substance and Sexual Activity    Alcohol use: Never    Drug use: Never     Social Determinants of Health     Financial Resource Strain: Low Risk     Difficulty of Paying Living Expenses: Not hard at all   Food Insecurity: No Food Insecurity    Worried About Running Out of Food in the Last Year: Never true    Ran Out of Food in the Last Year: Never true     Review of systems: Not obtainable due to confusion. Physical Exam:    VITALS:  BP (!) 102/45   Pulse (!) 120   Temp 98.1 °F (36.7 °C) (Axillary)   Resp 20   Wt 188 lb 7.9 oz (85.5 kg)   SpO2 96%   BMI 31.37 kg/m²   TEMPERATURE:  Current - Temp: 98.1 °F (36.7 °C);  Max - Temp  Av.6 °F (37 °C)  Min: 97.9 °F (36.6 °C)  Max: 99.8 °F (37.7 °C)  RESPIRATIONS RANGE: Resp  Av.5  Min: 20  Max: 32  PULSE RANGE: Pulse  Av.5  Min: 117  Max: 138  BLOOD PRESSURE RANGE:  Systolic (07HEF), DEH:88 , Min:90 , TYP:542  ; Diastolic (89TFA), WGC:40, Min:42, Max:67   PULSE OXIMETRY RANGE: SpO2  Av.9 %  Min: 87 %  Max: 96 %    Constitutional: appears stated age and slowed mentation    Skin: Skin color, texture, turgor normal. No rashes or lesions    Head: Normocephalic, without obvious abnormality, atraumatic     ENT: No rhinorrhea or epistaxis    Neck: Supple with no jugular venous distention. Cardiovascular/Edema: regular rate and rhythm, S1, S2 normal, no murmur, click, rub or gallop    Respiratory: Lungs: clear to auscultation bilaterally    Abdomen: soft, non-tender; bowel sounds normal; no masses,  no organomegaly    Back: symmetric, no curvature. ROM normal. No CVA tenderness. Extremities: extremities normal, atraumatic, no cyanosis or edema    Neuro:  Grossly normal    CBC:   Recent Labs     11/29/22 2105 11/30/22  0842   WBC 21.8* 28.9*   HGB 7.9* 7.4*    362     BMP:    Recent Labs     11/29/22 2105 11/29/22  2248 11/29/22  2328 11/30/22  0000 11/30/22  0519 11/30/22  0842   *  --   --   --  134*  --    K 5.6*  --   --  5.1 5.8* 5.3   CL 98  --   --   --  102  --    CO2 16*  --   --   --  17*  --    BUN 93*  --   --   --  91*  --    CREATININE 3.02*  --   --   --  2.92*  --    GLUCOSE 82 76 145  --  113*  --        Lab Results   Component Value Date/Time    NITRU NEGATIVE 11/29/2022 08:35 PM    COLORU Dark Yellow 11/29/2022 08:35 PM    PHUR 5.0 11/29/2022 08:35 PM    WBCUA 21 TO 50 11/29/2022 08:35 PM    RBCUA 3 to 5 11/29/2022 08:35 PM    BACTERIA FEW 11/29/2022 08:35 PM    SPECGRAV 1.018 11/29/2022 08:35 PM    LEUKOCYTESUR LARGE 11/29/2022 08:35 PM    UROBILINOGEN Normal 11/29/2022 08:35 PM    BILIRUBINUR NEGATIVE  Verified by ictotest. 11/29/2022 08:35 PM    BLOODU MODERATE 07/24/2022 11:21 AM    GLUCOSEU NEGATIVE 11/29/2022 08:35 PM    GLUCOSEU NEGATIVE 07/24/2022 11:21 AM    KETUA TRACE 11/29/2022 08:35 PM     Urine Creatinine:     Lab Results   Component Value Date/Time    LABCREA 178.6 08/02/2021 12:02 PM    LABCREA 177.0 08/02/2021 12:02 PM     IMPRESSION/RECOMMENDATIONS:      1. Acute kidney injury - Consistent with prerenal azotemia although obstructive uropathy will need to be excluded. Plan: Bladder scan. Renal ultrasound to rule out hydronephrosis. IV fluid at 100 mm/h. BOB and complements. Plasma free light chains with ratio.   Random urine sodium and chloride. Urinalysis and urine microscopy. Monitor urine output closely. Basic metabolic profile daily. There are medications for any session of acute hemodialysis at this time. 2.  Hyperkalemia - secondary to diminished GFR. Will treat medically and if unresponsive dialysis may be necessary    3. Hyponatremia - check serum and urine osmolality. 4.  Non-anion gap metabolic acidosis - secondary to uremia. Treat with bicarbonate drip. Prognosis is guarded. Thank you very much for the courtesy and confidence of this consultation.     Colt Cobos MD FACP  Attending Nephrologist  11/30/2022 2:03 PM

## 2022-11-30 NOTE — PROGRESS NOTES
67 Y/F who has past medical history of metastatic endometrial cancer presented in ED for concern of altered mental status. She is currently being treated for UTI with linezolide and zosyn. Her code status has been changed to [facility] after discussion with patient. Plan    Metastatic endometrial cancer  - patient decided to continue comfort care and plan for hospice care. Code status changed to DNR- CC. Palliative team is consulted. Hospice meeting today afternoon.  - Continue fentanyl every 2 hours prn. UTI- Continue linezolide and zosyn.  Follow with blood and urin culture    Diya Tubbs MD  Internal Medicine, PGY 3    Electronically signed by Diya Tubbs MD on 11/30/2022 at 12:25 PM

## 2022-11-30 NOTE — PROGRESS NOTES
Physical Therapy        Physical Therapy Cancel Note      DATE: 2022    NAME: Anna Marie Villanueva  MRN: [id number]   : 1950      Patient not seen this date for Physical Therapy due to:    2022 at 18- PT evaluation deferred by nurse Elyssa CUELLAR due to patient's medical status. Hospice meeting to be scheduled for  today.        Electronically signed by Scotty Corcoran PT on 2022 at 11:57 AM

## 2022-11-30 NOTE — PROGRESS NOTES
Dr. Michelle Estrada returned page. Nurse updated him on consult, and patient condition. Nurse reviewed current labs including potassium.  Received orders for iv dextrose, iv insulin, and recheck potassium at 9am.

## 2022-11-30 NOTE — ED PROVIDER NOTES
[address]  Emergency Department Encounter  EmergencyMedicine Resident     Pt Sylvain Haq  MRN: [id number]  [place] 1950  Date of evaluation: 22  PCP:  Candace Tran MD      CHIEF COMPLAINT       Chief Complaint   Patient presents with    Altered Mental Status       HISTORY OF PRESENT ILLNESS  (Location/Symptom, Timing/Onset, Context/Setting, Quality, Duration, Modifying Factors, Severity.)      Anna Marie Villanueva is a 67 y.o. female who presents with hypotension, general weakness, altered mentation. Sent from skilled nursing facility. Has a history of metastatic cancer. Patient appears ill, tachycardic and hypertensive, dry. Alert and answering questions appropriately stating that she does not want labs drawn until her family gets here. She is denying any pain. Arriving with O2 NC 4LPM. Wears o2 at facility. Wearing a brief. Abdomen soft, non tender. Looks unkept. Pulses in tact and equal in all four extremities. History of endometrial cancer with mets to the lungs, hypertension, DM type II. PAST MEDICAL / SURGICAL / SOCIAL / FAMILY HISTORY      has a past medical history of Anxiety and depression, Anxiety and depression, Endometrial cancer ([address] Fibroid, Hyperlipidemia, Hypertension, Leiomyomatosis, Lung nodule, Obesity, Osteoarthritis, Radiculopathy, cervical, Renal mass, Restrictive lung disease, Retinal abnormality - diabetes related, and Type II or unspecified type diabetes mellitus without mention of complication, not stated as uncontrolled. has a past surgical history that includes Lung biopsy; Cardiac catheterization; and  section.       Social History     Socioeconomic History    Marital status: Single     Spouse name: Not on file    Number of children: Not on file    Years of education: Not on file    Highest education level: Not on file   Occupational History    Not on file   Tobacco Use    Smoking status: Never    Smokeless tobacco: Never Vaping Use    Vaping Use: Never used   Substance and Sexual Activity    Alcohol use: Never    Drug use: Never    Sexual activity: Not on file   Other Topics Concern    Not on file   Social History Narrative    Not on file     Social Determinants of Health     Financial Resource Strain: Low Risk     Difficulty of Paying Living Expenses: Not hard at all   Food Insecurity: No Food Insecurity    Worried About Running Out of Food in the Last Year: Never true    Ran Out of Food in the Last Year: Never true   Transportation Needs: Not on file   Physical Activity: Not on file   Stress: Not on file   Social Connections: Not on file   Intimate Partner Violence: Not on file   Housing Stability: Not on file       Family History   Problem Relation Age of Onset    High Blood Pressure Mother     Diabetes Mother     Heart Disease Mother     Cancer Father        Allergies:  Patient has no known allergies. Home Medications:  Prior to Admission medications    Medication Sig Start Date End Date Taking? Authorizing Provider   oxyCODONE-acetaminophen (PERCOCET) 5-325 MG per tablet Percocet 5 mg-325 mg tablet   Take 1 tablet every 6 hours by oral route. Historical Provider, MD   ondansetron (ZOFRAN-ODT) 4 MG disintegrating tablet Zofran ODT 4 mg disintegrating tablet   Place 2 tablets every 6 hours by translingual route. Historical Provider, MD   furosemide (LASIX) 20 MG tablet furosemide 20 mg tablet   Take 1 tablet every day by oral route. Historical Provider, MD   Ascorbic Acid  MG CPCR ascorbic acid (vitamin C) 500 mg capsule   Take twice a day by oral route.     Historical Provider, MD Mccord Maria Esther strip  8/22/22   Historical Provider, MD   Lancets ([address]) [address] [facility]  8/22/22   Historical Provider, MD   latanoprost (XALATAN) 0.005 % ophthalmic solution  8/22/22   Historical Provider, MD   rivaroxaban (XARELTO) 10 MG TABS tablet Take 1 tablet by mouth daily (with breakfast) 8/24/22   George MD Vel   pantoprazole (PROTONIX) 40 MG tablet Take 1 tablet by mouth in the morning. 8/15/22   Mitch De La Torre MD   atorvastatin (LIPITOR) 10 MG tablet Take 10 mg by mouth in the morning. Historical Provider, MD   ferrous sulfate (IRON 325) 325 (65 Fe) MG tablet Take 325 mg by mouth in the morning and 325 mg before bedtime. Historical Provider, MD   gabapentin (NEURONTIN) 300 MG capsule Take 1 capsule by mouth 3 times daily for 90 days. 11/9/21 8/24/22  Ishmael Mcallister MD       REVIEW OF SYSTEMS    (2-9 systems for level 4, 10 or more for level 5)      Review of Systems   Constitutional:  Positive for activity change and fatigue. Negative for chills, diaphoresis and fever. HENT:  Negative for congestion, rhinorrhea, sinus pressure and sore throat. Eyes:  Negative for photophobia, pain and redness. Respiratory:  Negative for cough, chest tightness and shortness of breath. Cardiovascular:  Negative for chest pain and leg swelling. Gastrointestinal:  Negative for abdominal pain, constipation, diarrhea, nausea and vomiting. Genitourinary:  Negative for dysuria, flank pain, frequency and urgency. Musculoskeletal:  Negative for arthralgias, myalgias and neck stiffness. Skin:  Positive for wound. Negative for color change, pallor and rash. Neurological:  Positive for weakness. Negative for dizziness, syncope, light-headedness, numbness and headaches. PHYSICAL EXAM   (up to 7 for level 4, 8 or more for level 5)      INITIAL VITALS:   BP (!) 95/46   Pulse (!) 129   Temp 98.6 °F (37 °C) (Oral)   Resp 25   Wt 181 lb (82.1 kg)   SpO2 90%   BMI 30.12 kg/m²     Physical Exam  Constitutional:  ill appearing, chronically ill  HENT:  Normocephalic, Atraumatic, right external ear with dark dried impaction,  Nose normal.   Neck: Normal range of motion, No stridor. Eyes:   No discharge.    Respiratory:   Tachypenic, No respiratory distress, Rhonchi bilaterally  Cardiovascular: Normal S1, S2. No rubs, gallops or murmurs. Gastrointestinal:  No organomegaly, no tenderness, no rebound or guarding. Musculoskeletal:  No extremity deformity. Lymphatic: No lymphadenopathy noted  Skin:  Warm, Dry,  No rash. Neurologic:  Alert & oriented x 3, Normal motor function,  No focal deficits noted.                 DIFFERENTIAL  DIAGNOSIS     PLAN (LABS / IMAGING / EKG):  Orders Placed This Encounter   Procedures    Blood Culture 1    Culture, Blood 2    COVID-19 & Influenza Combo    Culture, Urine    XR CHEST PORTABLE    CT HEAD WO CONTRAST    CT ABDOMEN PELVIS WO CONTRAST Additional Contrast? None    CBC with Auto Differential    Comprehensive Metabolic Panel    Lactate, Sepsis    Procalcitonin    Urinalysis with Reflex to Culture    Troponin    Blood Gas, Venous    Blood Gas, Venous    Microscopic Urinalysis    Potassium    Neurologic Status Assessment    Strict intake and output    Vital Signs Per Unit Routine    Telemetry monitoring - 24 hour duration    DNR comfort care    Inpatient consult to Internal Medicine    POCT Glucose    POCT Glucose    POCT Glucose    POC Glucose Fingerstick    POC Glucose Fingerstick    POC Glucose Fingerstick    EKG 12 Lead    EKG 12 Lead    Saline lock IV    ADMIT TO INPATIENT       MEDICATIONS ORDERED:  Orders Placed This Encounter   Medications    lactated ringers bolus    piperacillin-tazobactam (ZOSYN) 4,500 mg in dextrose 5 % 100 mL IVPB (mini-bag)     Order Specific Question:   Antimicrobial Indications     Answer:   Sepsis of Unknown Etiology    linezolid (ZYVOX) IVPB 600 mg     Order Specific Question:   Antimicrobial Indications     Answer:   Sepsis of Unknown Etiology    AND Linked Order Group     insulin regular (HUMULIN R;NOVOLIN R) injection 6 Units     dextrose bolus 10% 250 mL    fentaNYL (SUBLIMAZE) injection 25 mcg       DDX: Sepsis, pneumonia, UTI, dehydration, ERVIN, progression of cancer, PE, ACS    DIAGNOSTIC RESULTS / EMERGENCY DEPARTMENT COURSE / MDM   LAB RESULTS:  Results for orders placed or performed during the hospital encounter of 11/29/22   Blood Culture 1    Specimen: Blood   Result Value Ref Range    Specimen Description . BLOOD     Culture NO GROWTH <24 HRS    Culture, Blood 2    Specimen: Blood   Result Value Ref Range    Specimen Description . BLOOD     Culture NO GROWTH <24 HRS    COVID-19 & Influenza Combo    Specimen: Nasopharyngeal Swab   Result Value Ref Range    Specimen Description . NASOPHARYNGEAL SWAB     Source . NASOPHARYNGEAL SWAB     SARS-CoV-2 RNA, RT PCR Not Detected Not Detected    INFLUENZA A Not Detected Not Detected    INFLUENZA B Not Detected Not Detected   CBC with Auto Differential   Result Value Ref Range    WBC 21.8 (H) 3.5 - 11.0 k/uL    RBC 3.50 (L) 4.0 - 5.2 m/uL    Hemoglobin 7.9 (L) 12.0 - 16.0 g/dL    Hematocrit 25.3 (L) 36 - 46 %    MCV 72.3 (L) 80 - 100 fL    MCH 22.6 (L) 26 - 34 pg    MCHC 31.2 31 - 37 g/dL    RDW 19.9 (H) 11.5 - 14.9 %    Platelets 023 084 - 525 k/uL    MPV 6.2 6.0 - 12.0 fL    Seg Neutrophils 93 (H) 36 - 66 %    Lymphocytes 2 (L) 24 - 44 %    Monocytes 5 1 - 7 %    Eosinophils % 0 0 - 4 %    Basophils 0 0 - 2 %    Segs Absolute 20.27 (H) 1.3 - 9.1 k/uL    Absolute Lymph # 0.44 (L) 1.0 - 4.8 k/uL    Absolute Mono # 1.09 0.1 - 1.3 k/uL    Absolute Eos # 0.00 0.0 - 0.4 k/uL    Basophils Absolute 0.00 0.0 - 0.2 k/uL    Morphology ANISOCYTOSIS PRESENT     Morphology HYPOCHROMIA PRESENT     Morphology MICROCYTOSIS PRESENT    Comprehensive Metabolic Panel   Result Value Ref Range    Glucose 82 70 - 99 mg/dL    BUN 93 (HH) 8 - 23 mg/dL    Creatinine 3.02 (H) 0.50 - 0.90 mg/dL    Est, Glom Filt Rate 16 (L) >60 mL/min/1.73m2    Calcium 9.6 8.6 - 10.4 mg/dL    Sodium 131 (L) 135 - 144 mmol/L    Potassium 5.6 (H) 3.7 - 5.3 mmol/L    Chloride 98 98 - 107 mmol/L    CO2 16 (L) 20 - 31 mmol/L    Anion Gap 17 9 - 17 mmol/L    Alkaline Phosphatase 74 35 - 104 U/L    ALT 6 5 - 33 U/L    AST 18 <32 U/L    Total Bilirubin 0.8 0.3 - 1.2 mg/dL    Total Protein 7.3 6.4 - 8.3 g/dL    Albumin 2.5 (L) 3.5 - 5.2 g/dL   Lactate, Sepsis   Result Value Ref Range    Lactic Acid, Sepsis 2.2 (H) 0.5 - 1.9 mmol/L   Lactate, Sepsis   Result Value Ref Range    Lactic Acid, Sepsis 2.1 (H) 0.5 - 1.9 mmol/L   Procalcitonin   Result Value Ref Range    Procalcitonin 4.50 (H) <0.09 ng/mL   Urinalysis with Reflex to Culture    Specimen: Urine   Result Value Ref Range    Color, UA Dark Yellow (A) Yellow    Turbidity UA Turbid (A) Clear    Glucose, Ur NEGATIVE NEGATIVE    Bilirubin Urine NEGATIVE  Verified by ictotest. (A) NEGATIVE    Ketones, Urine TRACE (A) NEGATIVE    Specific Gravity, UA 1.018 1.000 - 1.030    Urine Hgb LARGE (A) NEGATIVE    pH, UA 5.0 5.0 - 8.0    Protein, UA 2+ (A) NEGATIVE    Urobilinogen, Urine Normal Normal    Nitrite, Urine NEGATIVE NEGATIVE    Leukocyte Esterase, Urine LARGE (A) NEGATIVE   Troponin   Result Value Ref Range    Troponin, High Sensitivity 83 (HH) 0 - 14 ng/L   Troponin   Result Value Ref Range    Troponin, High Sensitivity 76 (HH) 0 - 14 ng/L   Microscopic Urinalysis   Result Value Ref Range    WBC, UA 21 TO 50 /HPF    RBC, UA 3 to 5 /HPF    Casts UA 3 to 5 /LPF    Epithelial Cells UA 3 to 5 /HPF    Bacteria, UA FEW (A) None   Potassium   Result Value Ref Range    Potassium 5.1 3.7 - 5.3 mmol/L   POCT Glucose   Result Value Ref Range    Glucose 76 mg/dL    QC OK? ok    POCT Glucose   Result Value Ref Range    Glucose 145 mg/dL    QC OK? ok    POC Glucose Fingerstick   Result Value Ref Range    POC Glucose 76 65 - 105 mg/dL   POC Glucose Fingerstick   Result Value Ref Range    POC Glucose 145 (H) 65 - 105 mg/dL   POC Glucose Fingerstick   Result Value Ref Range    POC Glucose 128 (H) 65 - 105 mg/dL   EKG 12 Lead   Result Value Ref Range    Ventricular Rate 136 BPM    Atrial Rate 136 BPM    P-R Interval 122 ms    QRS Duration 72 ms    Q-T Interval 300 ms    QTc Calculation (Bazett) 451 ms    P Axis 18 degrees    R Axis 50 degrees    T Axis 7 degrees         RADIOLOGY:  XR CHEST PORTABLE   Final Result   Diffuse bilateral pulmonary nodules. There is increased density in the right   lower lobe which could be due to worsening pulmonary nodules or superimposed   pneumonia. Clinical correlation is recommended. CT HEAD WO CONTRAST    (Results Pending)   CT ABDOMEN PELVIS WO CONTRAST Additional Contrast? None    (Results Pending)          EKG  EKG Interpretation    Interpreted by emergency department physician    Rhythm: sinus tachycardia  Rate: tachycardia  Axis: normal  Ectopy: none  Conduction: normal  ST Segments: normal  T Waves: normal  Q Waves: none    Clinical Impression: no acute changes    Esthela Drummond MD     All EKG's are interpreted by the Emergency Department Physician who either signs or Co-signs this chart in the absence of a cardiologist.    IMPRESSION: 79-year-old male arriving from a nursing facility with metastatic cancer. Presenting tachypneic, tachycardic, unkept appearance poor hygiene, hypotensive. Concern for sepsis. Rales bilaterally with O2 sats 88 to 92%, concern for pneumonia. Also concern for UTI. Currently refusing all labs until family arrives. Currently full code. Anticipate obtaining sepsis work-up, urinalysis, cardiac work-up, antibiotics, fluid, discussions about goals of care, admission      EMERGENCY DEPARTMENT COURSE:  ED Course as of 11/30/22 0108   Tue Nov 29, 2022 2000 Refusing lab work until family arrives. She is fully alert and oriented answering questions appropriately. [QC]   2011 Family Arrived, patient is okay with labs being drawn and treatment being rendered. [QC]   2127 No recent echo in chart [QC]   2144 Creatinine(!): 3.02 [QC]   2144 Procalcitonin(!): 4.50  Acute kidney injury [QC]   2152 WBC(!): 21.8 [QC]   2213    IMPRESSION:  Diffuse bilateral pulmonary nodules.   There is increased density in the right  lower lobe which could be due to worsening pulmonary nodules or superimposed  pneumonia. Clinical correlation is recommended. [QC]   7880 Pneumonia, uremia, ERVIN [QC]   2236 ABG 7.38, CO2 30.7, PO2 143 [QC]   2238 Per family patient does wear nasal cannula at facility, unknown amount [QC]   2032 INFLUENZA A: Not Detected [QC]   4078 SARS-CoV-2 RNA, RT PCR: Not Detected [QC]   Wed Nov 30, 2022   2937 See Dr. Pelaez Apo note for goals of care with daughter. Will obtain CT head to evaluate for mets in the event of starting heparin for likely PE.    CT abd pelvis for concerns for renal obstruction [QC]      ED Course User Index  [QC] Ajay Fierro MD         Sepsis Times and Checklist  Vital Signs: BP: (!) 95/46  Heart Rate: (!) 129  Resp: 25  Temp: 98.6 °F (37 °C) SpO2: 90 %  SIRS (>2) Non Pregnant       Temp > 38.3C or < 36C   HR > 90   RR > 20   WBC > 12 or < 4 or >10% bands  SIRS (>2) Pregnant 20 weeks until 3 days postpartum   Temp > 38C or <36C   HR >110   RR > 24   WBC >15 or < 4 or >10% bands   SIRS (>2) and confirmed or suspected source of infection = Sepsis  Is Sepsis due to likely bacterial infection?: Yes    Sepsis Identified:  Date: 11/30   Time: 2011- pt refusing labs until 2100  Sepsis Orders:   CBC(required): Yes   CMP: Yes   PT/PTT: Yes   Blood Cultures x2(required): Yes   Urinalysis and Urine Culture: Yes   Lactate(required): Yes   Antibiotics Given (within 3 hours of sepsis identification, after blood cultures):  Yes    (If unable to obtain IV access for IV antibiotics within 3 hours of identification of sepsis, IM antibiotics is acceptable.)              If lactate >2.0 MUST repeat within 6 hours    If elevated, is elevated lactate from a likely infectious source?: Yes. IV Fluid Bolus:  Is lactate > 4.0:  No  Fluids must be initiated within 3 hours of sepsis identification. These fluids must have a rate > 125 ml/hr.     Is the patient Morbidly Obese (BMI > 30): No  IV Fluids given prior to arrival can be used in this calculation but the following information must be documented:  Start time: 2100, Type of fluid LR, Volume of fluid 1700cc, and Rate (Duration or End time) 2300. Does the patient or patient advocate refuse the entire 30 ml/kg IV fluid bolus? No      Septic Shock Identified (Initial lactate > 4.0 or 2 Hypotensive Blood Pressure readings within the first 6 hours): For septic shock sepsis focus physical exam must be completed AND documented (within 6 hours). Date: 11/29/2022 Time: 2100      Sepsis focus exam completed. For persistent hypotension after 30ml/kg fluid bolus vasopressors must be started (within 6 hours)       PROCEDURES:  none    CONSULTS:  IP CONSULT TO INTERNAL MEDICINE  IP CONSULT TO NEPHROLOGY        FINAL IMPRESSION      1. Pneumonia of right lower lobe due to infectious organism    2. Acute kidney injury ([facility] [address]    3. Dehydration    4. Acute cystitis without hematuria          DISPOSITION / PLAN     DISPOSITION Admitted 11/29/2022 11:51:45 PM      PATIENT REFERRED TO:  No follow-up provider specified.     DISCHARGE MEDICATIONS:  New Prescriptions    No medications on file       Mahogany Albarado MD  Emergency Medicine Resident PGY2    (Please note that portions of thisnote were completed with a voice recognition program.  Efforts were made to edit the dictations but occasionally words are mis-transcribed.)         Mahogany Albarado MD  Resident  11/30/22 3416

## 2022-11-30 NOTE — H&P
[address] [facility] Internal Medicine  Gali Reyes MD; Phil Guzman MD; Adolph Farnsworth MD; MD Aaliyah Santos MD; Mitch De La Torre MD  [facility]   [address] [facility]     HISTORY AND PHYSICAL EXAMINATION            Date:   2022  Patientname:  June Romana Gaul  Date of admission:  2022  7:46 PM  MRN:   [id number]  Account:  [de-identified]  YOB: 1950  PCP:    Ishmael Mcallister MD  Room:   10/10  Code Status:    Full      Chief Complaint:     Chief Complaint   Patient presents with    Altered Mental Status       History Obtained From:     patient    History of Present Illness:     June Romana Gaul is a 67 y.o. Non- / non  female who presents with Altered Mental Status   and is admitted to the hospital for the [facility] According to patient, she has come to the ER today from her skilled nursing facility because she has been \"feeling sick for a week. \" The patient was unable to expand on what feeling sick meant for her. She denies fever, chills, chest pain, abdominal pain, vomiting, diarrhea, and urinary symptoms. She endorses nausea and cough She reports no aggravating or alleviating factors.         Past Medical History:     Past Medical History:   Diagnosis Date    Anxiety and depression     Anxiety and depression     Endometrial cancer ([facility] [address] 2022    Fibroid     Hyperlipidemia     Hypertension     Leiomyomatosis     Lung nodule     Obesity     Osteoarthritis     Radiculopathy, cervical     Renal mass     Restrictive lung disease     Retinal abnormality - diabetes related     Type II or unspecified type diabetes mellitus without mention of complication, not stated as uncontrolled         Past Surgical History:     Past Surgical History:   Procedure Laterality Date    CARDIAC CATHETERIZATION       SECTION      LUNG BIOPSY          Medications Prior to Admission:     Prior to Admission medications    Medication Sig Start Date End Date Taking? Authorizing Provider   oxyCODONE-acetaminophen (PERCOCET) 5-325 MG per tablet Percocet 5 mg-325 mg tablet   Take 1 tablet every 6 hours by oral route. Historical Provider, MD   ondansetron (ZOFRAN-ODT) 4 MG disintegrating tablet Zofran ODT 4 mg disintegrating tablet   Place 2 tablets every 6 hours by translingual route. Historical Provider, MD   furosemide (LASIX) 20 MG tablet furosemide 20 mg tablet   Take 1 tablet every day by oral route. Historical Provider, MD   Ascorbic Acid  MG CPCR ascorbic acid (vitamin C) 500 mg capsule   Take twice a day by oral route. Historical Provider, MD   Ivone Baize strip  8/22/22   Historical Provider, MD   Lancets ([address]) [address] [facility]  8/22/22   Historical Provider, MD   latanoprost (XALATAN) 0.005 % ophthalmic solution  8/22/22   Historical Provider, MD   rivaroxaban (XARELTO) 10 MG TABS tablet Take 1 tablet by mouth daily (with breakfast) 8/24/22   [facility], MD   pantoprazole (PROTONIX) 40 MG tablet Take 1 tablet by mouth in the morning. 8/15/22   Onofre Arteaga MD   atorvastatin (LIPITOR) 10 MG tablet Take 10 mg by mouth in the morning. Historical Provider, MD   ferrous sulfate (IRON 325) 325 (65 Fe) MG tablet Take 325 mg by mouth in the morning and 325 mg before bedtime. Historical Provider, MD   gabapentin (NEURONTIN) 300 MG capsule Take 1 capsule by mouth 3 times daily for 90 days. 11/9/21 8/24/22  Blanca Lea MD        Allergies:     Patient has no known allergies. Social History:     Tobacco:    reports that she has never smoked. She has never used smokeless tobacco.  Alcohol:      reports no history of alcohol use. Drug Use:  reports no history of drug use.     Family History:     Family History   Problem Relation Age of Onset    High Blood Pressure Mother     Diabetes Mother     Heart Disease Mother     Cancer Father        REVIEW OF SYSTEMS     Review of Systems   Constitutional: Negative. HENT:  Positive for trouble swallowing (patient endorses difficulty swallowing). Eyes: Negative. Respiratory:  Positive for cough. Cardiovascular: Negative. Gastrointestinal:  Positive for nausea. Endocrine: Negative. Genitourinary: Negative. Musculoskeletal: Negative. Skin:  Positive for wound. Allergic/Immunologic: Negative. Neurological: Negative. Hematological: Negative. Psychiatric/Behavioral: Negative. PHYSICAL EXAM      BP (!) 96/53   Pulse (!) 125   Temp 99.8 °F (37.7 °C) (Oral)   Resp 22   Wt 181 lb (82.1 kg)   SpO2 92%   BMI 30.12 kg/m²  Body mass index is 30.12 kg/m². Physical Exam  Constitutional:       Appearance: She is ill-appearing. HENT:      Head: Normocephalic. Right Ear: There is impacted cerumen. Left Ear: Ear canal and external ear normal.      Nose: Congestion present. Comments: White discharge noted in the left Nare     Mouth/Throat:      Mouth: Mucous membranes are dry. Eyes:      Pupils: Pupils are equal, round, and reactive to light. Cardiovascular:      Rate and Rhythm: Regular rhythm. Tachycardia present. Pulses: Normal pulses. Heart sounds: Normal heart sounds. Pulmonary:      Breath sounds: Rales (Diffuse) present. Abdominal:      Palpations: There is mass. Comments: Several masses palpated throughout all four quadrants   Musculoskeletal:      Right lower leg: Edema present. Left lower leg: Edema present. Skin:     General: Skin is warm and dry. Neurological:      Mental Status: Mental status is at baseline.    Psychiatric:         Mood and Affect: Mood normal.       Investigations:      Laboratory Testing:  Recent Results (from the past 24 hour(s))   Brain Natriuretic Peptide    Collection Time: 11/29/22  6:48 AM   Result Value Ref Range    Pro- (H) <300 pg/mL   EKG 12 Lead    Collection Time: 11/29/22  8:31 PM   Result Value Ref Range Ventricular Rate 136 BPM    Atrial Rate 136 BPM    P-R Interval 122 ms    QRS Duration 72 ms    Q-T Interval 300 ms    QTc Calculation (Bazett) 451 ms    P Axis 18 degrees    R Axis 50 degrees    T Axis 7 degrees   Urinalysis with Reflex to Culture    Collection Time: 11/29/22  8:35 PM    Specimen: Urine   Result Value Ref Range    Color, UA Dark Yellow (A) Yellow    Turbidity UA Turbid (A) Clear    Glucose, Ur NEGATIVE NEGATIVE    Bilirubin Urine NEGATIVE  Verified by ictotest. (A) NEGATIVE    Ketones, Urine TRACE (A) NEGATIVE    Specific Gravity, UA 1.018 1.000 - 1.030    Urine Hgb LARGE (A) NEGATIVE    pH, UA 5.0 5.0 - 8.0    Protein, UA 2+ (A) NEGATIVE    Urobilinogen, Urine Normal Normal    Nitrite, Urine NEGATIVE NEGATIVE    Leukocyte Esterase, Urine LARGE (A) NEGATIVE   Microscopic Urinalysis    Collection Time: 11/29/22  8:35 PM   Result Value Ref Range    WBC, UA 21 TO 50 /HPF    RBC, UA 3 to 5 /HPF    Casts UA 3 to 5 /LPF    Epithelial Cells UA 3 to 5 /HPF    Bacteria, UA FEW (A) None   Lactate, Sepsis    Collection Time: 11/29/22  9:03 PM   Result Value Ref Range    Lactic Acid, Sepsis 2.2 (H) 0.5 - 1.9 mmol/L   COVID-19 & Influenza Combo    Collection Time: 11/29/22  9:03 PM    Specimen: Nasopharyngeal Swab   Result Value Ref Range    Specimen Description . NASOPHARYNGEAL SWAB     Source . NASOPHARYNGEAL SWAB     SARS-CoV-2 RNA, RT PCR Not Detected Not Detected    INFLUENZA A Not Detected Not Detected    INFLUENZA B Not Detected Not Detected   CBC with Auto Differential    Collection Time: 11/29/22  9:05 PM   Result Value Ref Range    WBC 21.8 (H) 3.5 - 11.0 k/uL    RBC 3.50 (L) 4.0 - 5.2 m/uL    Hemoglobin 7.9 (L) 12.0 - 16.0 g/dL    Hematocrit 25.3 (L) 36 - 46 %    MCV 72.3 (L) 80 - 100 fL    MCH 22.6 (L) 26 - 34 pg    MCHC 31.2 31 - 37 g/dL    RDW 19.9 (H) 11.5 - 14.9 %    Platelets 838 138 - 745 k/uL    MPV 6.2 6.0 - 12.0 fL    Seg Neutrophils 93 (H) 36 - 66 %    Lymphocytes 2 (L) 24 - 44 % Monocytes 5 1 - 7 %    Eosinophils % 0 0 - 4 %    Basophils 0 0 - 2 %    Segs Absolute 20.27 (H) 1.3 - 9.1 k/uL    Absolute Lymph # 0.44 (L) 1.0 - 4.8 k/uL    Absolute Mono # 1.09 0.1 - 1.3 k/uL    Absolute Eos # 0.00 0.0 - 0.4 k/uL    Basophils Absolute 0.00 0.0 - 0.2 k/uL    Morphology ANISOCYTOSIS PRESENT     Morphology HYPOCHROMIA PRESENT     Morphology MICROCYTOSIS PRESENT    Comprehensive Metabolic Panel    Collection Time: 11/29/22  9:05 PM   Result Value Ref Range    Glucose 82 70 - 99 mg/dL    BUN 93 (HH) 8 - 23 mg/dL    Creatinine 3.02 (H) 0.50 - 0.90 mg/dL    Est, Glom Filt Rate 16 (L) >60 mL/min/1.73m2    Calcium 9.6 8.6 - 10.4 mg/dL    Sodium 131 (L) 135 - 144 mmol/L    Potassium 5.6 (H) 3.7 - 5.3 mmol/L    Chloride 98 98 - 107 mmol/L    CO2 16 (L) 20 - 31 mmol/L    Anion Gap 17 9 - 17 mmol/L    Alkaline Phosphatase 74 35 - 104 U/L    ALT 6 5 - 33 U/L    AST 18 <32 U/L    Total Bilirubin 0.8 0.3 - 1.2 mg/dL    Total Protein 7.3 6.4 - 8.3 g/dL    Albumin 2.5 (L) 3.5 - 5.2 g/dL   Procalcitonin    Collection Time: 11/29/22  9:05 PM   Result Value Ref Range    Procalcitonin 4.50 (H) <0.09 ng/mL   Troponin    Collection Time: 11/29/22  9:05 PM   Result Value Ref Range    Troponin, High Sensitivity 83 (HH) 0 - 14 ng/L   Blood Culture 1    Collection Time: 11/29/22  9:07 PM    Specimen: Blood   Result Value Ref Range    Specimen Description . BLOOD     Culture NO GROWTH <24 HRS    Culture, Blood 2    Collection Time: 11/29/22  9:08 PM    Specimen: Blood   Result Value Ref Range    Specimen Description . BLOOD     Culture NO GROWTH <24 HRS    Lactate, Sepsis    Collection Time: 11/29/22 10:38 PM   Result Value Ref Range    Lactic Acid, Sepsis 2.1 (H) 0.5 - 1.9 mmol/L   Troponin    Collection Time: 11/29/22 10:38 PM   Result Value Ref Range    Troponin, High Sensitivity 76 (HH) 0 - 14 ng/L   POC Glucose Fingerstick    Collection Time: 11/29/22 10:47 PM   Result Value Ref Range    POC Glucose 76 65 - 105 mg/dL POCT Glucose    Collection Time: 11/29/22 10:48 PM   Result Value Ref Range    Glucose 76 mg/dL    QC OK? ok    POC Glucose Fingerstick    Collection Time: 11/29/22 11:24 PM   Result Value Ref Range    POC Glucose 145 (H) 65 - 105 mg/dL   POCT Glucose    Collection Time: 11/29/22 11:28 PM   Result Value Ref Range    Glucose 145 mg/dL    QC OK? ok    Potassium    Collection Time: 11/30/22 12:00 AM   Result Value Ref Range    Potassium 5.1 3.7 - 5.3 mmol/L   POC Glucose Fingerstick    Collection Time: 11/30/22 12:01 AM   Result Value Ref Range    POC Glucose 128 (H) 65 - 105 mg/dL       Imaging/Diagnostics:  XR CHEST PORTABLE    Result Date: 11/29/2022  Diffuse bilateral pulmonary nodules. There is increased density in the right lower lobe which could be due to worsening pulmonary nodules or superimposed pneumonia. Clinical correlation is recommended. Assessment :      Hospital Problems             Last Modified POA    * (Principal) Sepsis ([facility] [address] 11/29/2022 Yes       Plan:     Patient status inpatient in the Progressive Unit/Step down   Sepsis  -Fluid bolus administered in ED  -IV antibiotics initiated  -Blood culture x2  -Wound Culture   -Urine culture  -Sputum Culture  -Lactate 2.2 Recheck 2.1  -Procal 4.50  -WBC 21.8  -CXR   There is increased density in the right   lower lobe which could be due to worsening pulmonary nodules or superimposed   pneumonia.      Acute Kidney Injury  -Anasarca  -Creatinine 3.02; Baseline 0.56  -denies prior history of kidney disease  -NS fluid bolus in ED  -continue IVF on admission  -hold diuretics/nephrotoxic medications  -check renal ultrasound  -Consult nephrologist  -monitor BMP    Hyperkalemia  -Insulin and dextrose administered in ED  -Hold potassium sparing meds  -Renal diet  -monitor telemetry  --EKG PRN for chest pain  -Monitor BMP     Elevated BnP   -Patient clinical picture supports fluid overload related to renal failure  -+2 pitting edema BLE  -BnP 409 Baseline 57  -Repeat am BNP  -Consider Echo to rule our cardiac abnormalities  -Troponin downtrending 83--76. Baseline troponin 21  -Repeat AM troponin    Pulmonary Embolism  -Patient has history of recent Pulmonary Embolism. IVC filter in place. Anticoagulation recently discontinued due to GI bleed. -D Dimer    Dysphagia  -Patient reports persistent difficulty swallowing her meals  -SLP evaluation and treatment    Metastatic disease  -Outpatient follow up has been ordered. Patient did not show for any appointments.  -Patient POA states that patient does not wish to have any cancer therapy. Pressure Ulcers Stages II-IV  -Consult Wound care        Recent Code Status Change to [facility]  -Consult palliative care  -Consult Hopsice                    Disposition 2-3 days    Consultations:   IP CONSULT TO INTERNAL MEDICINE  IP CONSULT TO NEPHROLOGY    Patient is admitted as inpatient status because of co-morbidities listed above, severity of signs and symptoms as outlined, requirement for current medical therapies and most importantly because of direct risk to patient if care not provided in a hospital setting. Expected length of stay > 48 hours. Brian Garvin RN  11/30/2022  12:24 AM    Copy sent to Dr. Joe Steward MD    Please note that this chart was generated using voice recognition Dragon dictation software. Although every effort was made to ensure the accuracy of this automated transcription, some errors in transcription may have occurred. [address]lene [address]  [address]    Phone [phone]

## 2022-11-30 NOTE — H&P
[facility] Internal Medicine  Amelia Monet MD; Walter Gonzalez MD; Dorian Duenas MD; MD Sheldon Morocho MD; Sushant Hu MD  [facility]   [facility]     HISTORY AND PHYSICAL EXAMINATION            Date:   2022  Patientname:  Anna Marie Silva  Date of admission:  2022  7:46 PM  MRN:   [id number]  Account:  [de-identified]  YOB: 1950  PCP:    Esthela Coles MD  Room:   [facility]2349-  Code Status:    Full Code      Chief Complaint:     Chief Complaint   Patient presents with    Altered Mental Status       History Obtained From:     patient    History of Present Illness:     Anna Marie Silva is a 67 y.o. Non- / non  female who presents with Altered Mental Status   and is admitted to the hospital for the management of Sepsis ([facility] [address] According to patient, she has come to the ER today from her skilled nursing facility because she has been \"feeling sick for a week. \" The patient was unable to expand on what feeling sick meant for her. She denies fever, chills, chest pain, abdominal pain, vomiting, diarrhea, and urinary symptoms. She endorses nausea and cough She reports no aggravating or alleviating factors.         Past Medical History:     Past Medical History:   Diagnosis Date    Anxiety and depression     Anxiety and depression     Endometrial cancer ([facility] [address] 2022    Fibroid     Hyperlipidemia     Hypertension     Leiomyomatosis     Lung nodule     Obesity     Osteoarthritis     Radiculopathy, cervical     Renal mass     Restrictive lung disease     Retinal abnormality - diabetes related     Type II or unspecified type diabetes mellitus without mention of complication, not stated as uncontrolled         Past Surgical History:     Past Surgical History:   Procedure Laterality Date    CARDIAC CATHETERIZATION       SECTION      LUNG BIOPSY          Medications Prior to Admission:     Prior to Admission medications    Medication Sig Start Date End Date Taking? Authorizing Provider   gabapentin (NEURONTIN) 100 MG capsule Take 100 mg by mouth 3 times daily. Yes Historical Provider, MD   ergocalciferol (ERGOCALCIFEROL) 1.25 MG (20561 UT) capsule Take 50,000 Units by mouth once a week   Yes Historical Provider, MD   furosemide (LASIX) 20 MG tablet furosemide 20 mg tablet   Take 1 tablet every day by oral route. Historical Provider, MD   Ascorbic Acid  MG CPCR ascorbic acid (vitamin C) 500 mg capsule   Take twice a day by oral route. Historical Provider, MD Slade Hiss strip  8/22/22   Historical Provider, MD   Lancets ([address]) [facility]  8/22/22   Historical Provider, MD   pantoprazole (PROTONIX) 40 MG tablet Take 1 tablet by mouth in the morning. 8/15/22   Leigh Scott MD   atorvastatin (LIPITOR) 10 MG tablet Take 10 mg by mouth in the morning. Historical Provider, MD   ferrous sulfate (IRON 325) 325 (65 Fe) MG tablet Take 325 mg by mouth in the morning and 325 mg before bedtime. Historical Provider, MD   gabapentin (NEURONTIN) 300 MG capsule Take 1 capsule by mouth 3 times daily for 90 days. 11/9/21 8/24/22  Nica Patterson MD        Allergies:     Patient has no known allergies. Social History:     Tobacco:    reports that she has never smoked. She has never used smokeless tobacco.  Alcohol:      reports no history of alcohol use. Drug Use:  reports no history of drug use. Family History:     Family History   Problem Relation Age of Onset    High Blood Pressure Mother     Diabetes Mother     Heart Disease Mother     Cancer Father        REVIEW OF SYSTEMS     Review of Systems   Constitutional: Negative. HENT:  Positive for trouble swallowing (patient endorses difficulty swallowing). Eyes: Negative. Respiratory:  Positive for cough. Cardiovascular: Negative. Gastrointestinal:  Positive for nausea. Endocrine: Negative.     Genitourinary: Negative. Musculoskeletal: Negative. Skin:  Positive for wound. Allergic/Immunologic: Negative. Neurological: Negative. Hematological: Negative. Psychiatric/Behavioral: Negative. PHYSICAL EXAM      BP (!) 99/50   Pulse (!) 117   Temp 97.9 °F (36.6 °C) (Axillary)   Resp 20   Wt 188 lb 7.9 oz (85.5 kg)   SpO2 92%   BMI 31.37 kg/m²  Body mass index is 31.37 kg/m². Physical Exam  Constitutional:       Appearance: She is ill-appearing. HENT:      Head: Normocephalic. Right Ear: There is impacted cerumen. Left Ear: Ear canal and external ear normal.      Nose: Congestion present. Comments: White discharge noted in the left Nare     Mouth/Throat:      Mouth: Mucous membranes are dry. Eyes:      Pupils: Pupils are equal, round, and reactive to light. Cardiovascular:      Rate and Rhythm: Regular rhythm. Tachycardia present. Pulses: Normal pulses. Heart sounds: Normal heart sounds. Pulmonary:      Breath sounds: Rales (Diffuse) present. Abdominal:      Palpations: There is mass. Comments: Several masses palpated throughout all four quadrants   Musculoskeletal:      Right lower leg: Edema present. Left lower leg: Edema present. Skin:     General: Skin is warm and dry. Neurological:      Mental Status: Mental status is at baseline.    Psychiatric:         Mood and Affect: Mood normal.       Investigations:      Laboratory Testing:  Recent Results (from the past 24 hour(s))   Brain Natriuretic Peptide    Collection Time: 11/29/22  6:48 AM   Result Value Ref Range    Pro- (H) <300 pg/mL   Blood Gas, Venous    Collection Time: 11/29/22  9:22 AM   Result Value Ref Range    pH, Nacho 7.384 7.320 - 7.420    pCO2, Nacho 30.7 (L) 39.0 - 55.0 mm Hg    pO2, Nacho 143.0 (H) 30.0 - 50.0 mm Hg    HCO3, Venous 18.3 (L) 24.0 - 30.0 mmol/L    Negative Base Excess, Nacho 6.8 (H) 0.0 - 2.0 mmol/L    O2 Sat, Nacho 94.6 %    Carboxyhemoglobin 5.3 %    Methemoglobin <0.0 %    Pt Temp 37.0     Text for Respiratory RESULTS TO PHYSICIAN    Blood Gas, Venous    Collection Time: 11/29/22 10:50 AM   Result Value Ref Range    pH, Nacho 7.470 (H) 7.320 - 7.420    pCO2, Nacho 24.6 (L) 39.0 - 55.0 mm Hg    pO2, Nacho 181.0 (H) 30.0 - 50.0 mm Hg    HCO3, Venous 17.9 (L) 24.0 - 30.0 mmol/L    Negative Base Excess, Nacho 5.8 (H) 0.0 - 2.0 mmol/L    O2 Sat, Nacho 95.9 %    Carboxyhemoglobin 2.6 %    Methemoglobin 0.8 %    Pt Temp 37.0     Text for Respiratory RESULTS TO PHYSICIAN    EKG 12 Lead    Collection Time: 11/29/22  8:31 PM   Result Value Ref Range    Ventricular Rate 136 BPM    Atrial Rate 136 BPM    P-R Interval 122 ms    QRS Duration 72 ms    Q-T Interval 300 ms    QTc Calculation (Bazett) 451 ms    P Axis 18 degrees    R Axis 50 degrees    T Axis 7 degrees   Urinalysis with Reflex to Culture    Collection Time: 11/29/22  8:35 PM    Specimen: Urine   Result Value Ref Range    Color, UA Dark Yellow (A) Yellow    Turbidity UA Turbid (A) Clear    Glucose, Ur NEGATIVE NEGATIVE    Bilirubin Urine NEGATIVE  Verified by ictotest. (A) NEGATIVE    Ketones, Urine TRACE (A) NEGATIVE    Specific Gravity, UA 1.018 1.000 - 1.030    Urine Hgb LARGE (A) NEGATIVE    pH, UA 5.0 5.0 - 8.0    Protein, UA 2+ (A) NEGATIVE    Urobilinogen, Urine Normal Normal    Nitrite, Urine NEGATIVE NEGATIVE    Leukocyte Esterase, Urine LARGE (A) NEGATIVE   Microscopic Urinalysis    Collection Time: 11/29/22  8:35 PM   Result Value Ref Range    WBC, UA 21 TO 50 /HPF    RBC, UA 3 to 5 /HPF    Casts UA 3 to 5 /LPF    Epithelial Cells UA 3 to 5 /HPF    Bacteria, UA FEW (A) None   Lactate, Sepsis    Collection Time: 11/29/22  9:03 PM   Result Value Ref Range    Lactic Acid, Sepsis 2.2 (H) 0.5 - 1.9 mmol/L   COVID-19 & Influenza Combo    Collection Time: 11/29/22  9:03 PM    Specimen: Nasopharyngeal Swab   Result Value Ref Range    Specimen Description . NASOPHARYNGEAL SWAB     Source . NASOPHARYNGEAL SWAB     SARS-CoV-2 RNA, RT PCR Not Detected Not Detected    INFLUENZA A Not Detected Not Detected    INFLUENZA B Not Detected Not Detected   CBC with Auto Differential    Collection Time: 11/29/22  9:05 PM   Result Value Ref Range    WBC 21.8 (H) 3.5 - 11.0 k/uL    RBC 3.50 (L) 4.0 - 5.2 m/uL    Hemoglobin 7.9 (L) 12.0 - 16.0 g/dL    Hematocrit 25.3 (L) 36 - 46 %    MCV 72.3 (L) 80 - 100 fL    MCH 22.6 (L) 26 - 34 pg    MCHC 31.2 31 - 37 g/dL    RDW 19.9 (H) 11.5 - 14.9 %    Platelets 555 126 - 518 k/uL    MPV 6.2 6.0 - 12.0 fL    Seg Neutrophils 93 (H) 36 - 66 %    Lymphocytes 2 (L) 24 - 44 %    Monocytes 5 1 - 7 %    Eosinophils % 0 0 - 4 %    Basophils 0 0 - 2 %    Segs Absolute 20.27 (H) 1.3 - 9.1 k/uL    Absolute Lymph # 0.44 (L) 1.0 - 4.8 k/uL    Absolute Mono # 1.09 0.1 - 1.3 k/uL    Absolute Eos # 0.00 0.0 - 0.4 k/uL    Basophils Absolute 0.00 0.0 - 0.2 k/uL    Morphology ANISOCYTOSIS PRESENT     Morphology HYPOCHROMIA PRESENT     Morphology MICROCYTOSIS PRESENT    Comprehensive Metabolic Panel    Collection Time: 11/29/22  9:05 PM   Result Value Ref Range    Glucose 82 70 - 99 mg/dL    BUN 93 (HH) 8 - 23 mg/dL    Creatinine 3.02 (H) 0.50 - 0.90 mg/dL    Est, Glom Filt Rate 16 (L) >60 mL/min/1.73m2    Calcium 9.6 8.6 - 10.4 mg/dL    Sodium 131 (L) 135 - 144 mmol/L    Potassium 5.6 (H) 3.7 - 5.3 mmol/L    Chloride 98 98 - 107 mmol/L    CO2 16 (L) 20 - 31 mmol/L    Anion Gap 17 9 - 17 mmol/L    Alkaline Phosphatase 74 35 - 104 U/L    ALT 6 5 - 33 U/L    AST 18 <32 U/L    Total Bilirubin 0.8 0.3 - 1.2 mg/dL    Total Protein 7.3 6.4 - 8.3 g/dL    Albumin 2.5 (L) 3.5 - 5.2 g/dL   Procalcitonin    Collection Time: 11/29/22  9:05 PM   Result Value Ref Range    Procalcitonin 4.50 (H) <0.09 ng/mL   Troponin    Collection Time: 11/29/22  9:05 PM   Result Value Ref Range    Troponin, High Sensitivity 83 (HH) 0 - 14 ng/L   Blood Culture 1    Collection Time: 11/29/22  9:07 PM    Specimen: Blood   Result Value Ref Range    Specimen Description . BLOOD Culture NO GROWTH <24 HRS    Culture, Blood 2    Collection Time: 11/29/22  9:08 PM    Specimen: Blood   Result Value Ref Range    Specimen Description . BLOOD     Culture NO GROWTH <24 HRS    Lactate, Sepsis    Collection Time: 11/29/22 10:38 PM   Result Value Ref Range    Lactic Acid, Sepsis 2.1 (H) 0.5 - 1.9 mmol/L   Troponin    Collection Time: 11/29/22 10:38 PM   Result Value Ref Range    Troponin, High Sensitivity 76 (HH) 0 - 14 ng/L   POC Glucose Fingerstick    Collection Time: 11/29/22 10:47 PM   Result Value Ref Range    POC Glucose 76 65 - 105 mg/dL   POCT Glucose    Collection Time: 11/29/22 10:48 PM   Result Value Ref Range    Glucose 76 mg/dL    QC OK? ok    POC Glucose Fingerstick    Collection Time: 11/29/22 11:24 PM   Result Value Ref Range    POC Glucose 145 (H) 65 - 105 mg/dL   POCT Glucose    Collection Time: 11/29/22 11:28 PM   Result Value Ref Range    Glucose 145 mg/dL    QC OK? ok    Potassium    Collection Time: 11/30/22 12:00 AM   Result Value Ref Range    Potassium 5.1 3.7 - 5.3 mmol/L   POC Glucose Fingerstick    Collection Time: 11/30/22 12:01 AM   Result Value Ref Range    POC Glucose 128 (H) 65 - 105 mg/dL   Comprehensive Metabolic Panel w/ Reflex to MG    Collection Time: 11/30/22  5:19 AM   Result Value Ref Range    Glucose 113 (H) 70 - 99 mg/dL    BUN 91 (HH) 8 - 23 mg/dL    Creatinine 2.92 (H) 0.50 - 0.90 mg/dL    Est, Glom Filt Rate 16 (L) >60 mL/min/1.73m2    Calcium 9.1 8.6 - 10.4 mg/dL    Sodium 134 (L) 135 - 144 mmol/L    Potassium 5.8 (H) 3.7 - 5.3 mmol/L    Chloride 102 98 - 107 mmol/L    CO2 17 (L) 20 - 31 mmol/L    Anion Gap 15 9 - 17 mmol/L    Alkaline Phosphatase 68 35 - 104 U/L    ALT 6 5 - 33 U/L    AST 19 <32 U/L    Total Bilirubin 0.7 0.3 - 1.2 mg/dL    Total Protein 6.6 6.4 - 8.3 g/dL    Albumin 2.1 (L) 3.5 - 5.2 g/dL   Protime-INR    Collection Time: 11/30/22  5:19 AM   Result Value Ref Range    Protime 15.6 (H) 11.8 - 14.6 sec    INR 1.2    Lactic Acid    Collection Time: 11/30/22  5:19 AM   Result Value Ref Range    Lactic Acid 1.8 0.5 - 2.2 mmol/L       Imaging/Diagnostics:  XR CHEST PORTABLE    Result Date: 11/29/2022  Diffuse bilateral pulmonary nodules. There is increased density in the right lower lobe which could be due to worsening pulmonary nodules or superimposed pneumonia. Clinical correlation is recommended. Assessment :      Hospital Problems             Last Modified POA    * (Principal) Sepsis ([facility] [address] 11/29/2022 Yes     Plan:     Patient status inpatient in the Progressive Unit/Step down     Sepsis  -Fluid bolus administered in ED  -IV antibiotics initiated  -Blood culture x2  -Urine culture  -Sputum Culture  -Lactate 2.2 Recheck 2.1  -Procal 4.50  -WBC 21.8  -CXR   There is increased density in the right   lower lobe which could be due to worsening pulmonary nodules or superimposed   pneumonia. Acute Kidney Injury  -Anasarca  -Creatinine 3.02; Baseline 0.56  -denies prior history of kidney disease  -NS fluid bolus in ED  -continue IVF on admission  -hold diuretics/nephrotoxic medications  -monitor BMP    Hyperkalemia  -Insulin and dextrose administered in ED  -Hold potassium sparing meds  -Renal diet  -monitor telemetry  --EKG PRN for chest pain  -Monitor BMP     Elevated BnP   -Patient clinical picture supports fluid overload related to renal failure  -+2 pitting edema BLE  -BnP 409 Baseline 57  -Repeat am BNP  -Consider Echo to rule our cardiac abnormalities  -Troponin downtrending 83--76. Baseline troponin 21  -Repeat AM troponin    Pulmonary Embolism  -Patient has history of recent Pulmonary Embolism. IVC filter in place. Anticoagulation recently discontinued due to GI bleed. Dysphagia  -Patient reports persistent difficulty swallowing her meals  -SLP evaluation and treatment    Metastatic disease  -Outpatient follow up has been ordered.  Patient did not show for any appointments.  -Patient POA states that patient does not wish to have any cancer therapy. Pressure Ulcers Stages II-IV  -Consult Wound care    Disposition 2-3 days    Consultations:   IP CONSULT TO INTERNAL MEDICINE  IP CONSULT TO NEPHROLOGY  IP CONSULT TO PALLIATIVE CARE    Patient is admitted as inpatient status because of co-morbidities listed above, severity of signs and symptoms as outlined, requirement for current medical therapies and most importantly because of direct risk to patient if care not provided in a hospital setting. Expected length of stay > 48 hours. CARLIN Rangel Student  Genevieve 1163, CARLIN - CNP  11/30/2022  6:27 AM    Copy sent to Dr. Oswald Randle MD    Please note that this chart was generated using voice recognition Dragon dictation software. Although every effort was made to ensure the accuracy of this automated transcription, some errors in transcription may have occurred. Arlene [address] [place], [place] [id number]  [place], [address] [place] [address]. Phone  and add on       I have discussed the care of Anna Marie Villanueva , including pertinent history and exam findings,      11/30/22    With Tae Vickers CNP  I have seen and examined the patient and the key elements of all parts of the encounter have been performed by me . I agree with the assessment, plan and orders as documented by the resident. Metastatic endometrial cancer  - patient decided to continue comfort care and plan for hospice care. Code status changed to DNR- CC. Palliative team is consulted. Hospice meeting today afternoon.  - Continue fentanyl every 2 hours prn. UTI- Continue linezolide and zosyn. Follow with blood and urin culture     Hospice consulted  Leroy Gonsales MD      [facility]  [place], [address] [place] [address].    Phone [phone]   Fax: [phone]  Answering Service: [phone]

## 2022-11-30 NOTE — PROGRESS NOTES
[address] [facility]   OCCUPATIONAL THERAPY MISSED TREATMENT NOTE   INPATIENT   Date: 22  Patient Name: Augie Alejandre       Room:   MRN: [id number]   Account #: [de-identified]    : 1950  (77 y.o.)  Gender: female                 REASON FOR MISSED TREATMENT:  Patient unable to participate   -    OT evaluation deferred by MARIO Ortiz due to medical status. Hospice meeting scheduled for later today. Will await decision/hospice consult.   BEVERLY Nance

## 2022-11-30 NOTE — ED PROVIDER NOTES
[facility]  eMERGENCY dEPARTMENT eNCOUnter   Attending Attestation     Pt Name: Dalton Ward  MRN: [id number]  [place] 1950  Date of evaluation: 11/29/22    History, EXAM, MDM:    Anna Marie Villanueva is a 67 y.o. female who presents with Altered Mental Status      68 yo F presenting with signs of sepsis. Diffuse bilateral infiltrates on x-ray. White blood cell count 21.8. The patient's uremic BUN is 93. The patient is in acute renal failure with a creatinine of 3. Potassium of 5.6. Bicarb is 16. Urinalysis has pyuria and a few bacteria. Influenza is negative. COVID is negative. Patient treated with antibiotics and fluids. 12:48 AM EST  I had a long conversation with Neftaly Gallegos and, emergency contact listed in the patient's chart. She is listed as the patient's decision maker. The patient was supposed to meet with hospice tomorrow. She has terminal cancer decided not to proceed with treatment. She has pulmonary emboli but was not able to be on anticoagulation because of GI bleeding. She is remaining hypotensive and tachycardic despite treatment with fluids and antibiotics. Rigo asked that we not do invasive procedures such as intubation central lines and arterial lines. She would like any medications that would not cause the patient discomfort. She would the patient to have comfort measures additionally. She would like CODE STATUS to be DNR CC.        EKG shows a sinus tachycardia rhythm.   HR is 136, , QRS 72, , no JOSE MANUEL, No STD,  TW flattening, the axis is normal.        Vitals:   Vitals:    11/30/22 0121 11/30/22 0200 11/30/22 0400 11/30/22 0645   BP: (!) 90/52 (!) 99/50  (!) 102/45   Pulse: (!) 119 (!) 117  (!) 120   Resp: 21 20 20   Temp:  97.9 °F (36.6 °C)  98.1 °F (36.7 °C)   TempSrc:  Axillary  Axillary   SpO2:  92%  96%   Weight:   188 lb 7.9 oz (85.5 kg)      I performed a history and physical examination of the patient and discussed management with the resident. I reviewed the residents note and agree with the documented findings and plan of care. Any areas of disagreement are noted on the chart. I was personally present for the key portions of any procedures. I have documented in the chart those procedures where I was not present during the key portions. I have personally reviewed all images and agree with the resident's interpretation. I have reviewed the emergency nurses triage note. I agree with the chief complaint, past medical history, past surgical history, allergies, medications, social and family history as documented unless otherwise noted below. Documentation of the HPI, Physical Exam and Medical Decision Making performed by medical students or scribes is based on my personal performance of the HPI, PE and MDM. For Phys Assistant/ Nurse Practitioner cases/documentation I have had a face to face evaluation of this patient and have completed at least one if not all key elements of the E/M (history, physical exam, and MDM). Additional findings are as noted.     Wellington Bauer MD  Attending Emergency  Physician                eWllington Bauer MD  11/30/22 1873

## 2022-11-30 NOTE — ED TRIAGE NOTES
Mode of arrival (squad #, walk in, police, etc) : Squad         Chief complaint(s): altered mental status , fever , tachycardia     Arrival Note (brief scenario, treatment PTA, etc). : Brought by Squad  from nursing home  with complains of  altered mental status . Tachycardia and  fever . Pt breathing on 4 liter 0xygne maintains  only 90-92 %. Multiple skin breakdown on    scrum and  back . C= \"Have you ever felt that you should Cut down on your drinking? \"  No  A= \"Have people Annoyed you by criticizing your drinking? \"  No  G= \"Have you ever felt bad or Guilty about your drinking? \"  No  E= \"Have you ever had a drink as an Eye-opener first thing in the morning to steady your nerves or to help a hangover? \"  No      Deferred []      Reason for deferring: N/A    *If yes to two or more: probable alcohol abuse. *

## 2022-11-30 NOTE — PROGRESS NOTES
Nurse sent secure message to Jama Mcmahon NP  for primary care. Nurse updated her that patient mews is a 5, and has dnrcc paperwork at bedside. Also asked if it was necessary to have third lactic drawn now, as patient has been getting blood work numerous times tonight, updated her that patient was coughing or throwing up thick chunky green liquid when staff was providing incontinence care. Jama Mcmahon in to see patient and talk with son. Okay to wait on further lab work in the morning and cluster it for care. Not necessary to monitor vitals every 30 mins d/t code status. Okay to call consult to Dr. Cruzito Bailey in morning, as patient is a DNRcc and wants just comfort measures.  Nurse reviewed ct abd with NP.

## 2022-11-30 NOTE — DISCHARGE SUMMARY
[facility]     Department of Internal Medicine - Staff Internal Medicine Teaching Service    INPATIENT DISCHARGE SUMMARY      Patient Identification:  Anna Marie Silva is a 67 y.o. female. :  1950  MRN: [id number]     Acct: [de-identified]   PCP: Esthela Coles MD  Admit Date:  2022  Discharge date and time: No discharge date for patient encounter. Attending Provider: Shelley Mora, [address] Problem Lists:  Principal Problem:    Sepsis [facility])  Resolved Problems:    * No resolved hospital problems. [facility] STAY     Brief Inpatient course:   Anna Marie Silva is a 67 y.o. female past medical history of metastatic endometrial cancer presented in ED for concern of altered mental status. She is currently being treated for UTI with linezolide and zosyn. Her code status has been changed to [facility] after discussion with patient. She agreed for Hospice care and plan to discharge there     Procedures/ Significant Interventions:    none    Consults:     Consults:     Final Specialist Recommendations/Findings:   IP CONSULT TO INTERNAL MEDICINE  IP CONSULT TO NEPHROLOGY  IP CONSULT TO PALLIATIVE CARE  IP CONSULT TO HOSPICE      Any Hospital Acquired Infections: none    Discharge Functional Status:  stable    DISCHARGE PLAN     Disposition: Hospice    Patient Instructions:   Current Discharge Medication List        START taking these medications    Details   morphine 2 MG/ML injection Infuse 1 mL intravenously every 2 hours as needed for Pain for up to 3 days. Qty: 1 mL, Refills: 0    Comments: Reduce doses taken as pain becomes manageable  Associated Diagnoses: Endometrial cancer (HCC)      LORazepam (ATIVAN) 2 MG/ML injection Infuse 0.5 mLs intravenously every 2 hours as needed (anxiety) for up to 3 days.   Qty: 30 each, Refills: 0    Associated Diagnoses: Endometrial cancer ([facility] [address]           CONTINUE these medications which have NOT CHANGED    Details   Lancets (ONETOUCH DELICA PLUS [id number]) MISC       gabapentin (NEURONTIN) 300 MG capsule Take 1 capsule by mouth 3 times daily for 90 days. Qty: 270 capsule, Refills: 3    Associated Diagnoses: Primary osteoarthritis of left shoulder           STOP taking these medications       gabapentin (NEURONTIN) 100 MG capsule Comments:   Reason for Stopping:         ergocalciferol (ERGOCALCIFEROL) 1.25 MG (79780 UT) capsule Comments:   Reason for Stopping:         oxyCODONE-acetaminophen (PERCOCET) 5-325 MG per tablet Comments:   Reason for Stopping:         ondansetron (ZOFRAN-ODT) 4 MG disintegrating tablet Comments:   Reason for Stopping:         furosemide (LASIX) 20 MG tablet Comments:   Reason for Stopping:         Ascorbic Acid  MG CPCR Comments:   Reason for Stopping:         Berle Mitts strip Comments:   Reason for Stopping:         latanoprost (XALATAN) 0.005 % ophthalmic solution Comments:   Reason for Stopping:         rivaroxaban (XARELTO) 10 MG TABS tablet Comments:   Reason for Stopping:         pantoprazole (PROTONIX) 40 MG tablet Comments:   Reason for Stopping:         atorvastatin (LIPITOR) 10 MG tablet Comments:   Reason for Stopping:         ferrous sulfate (IRON 325) 325 (65 Fe) MG tablet Comments:   Reason for Stopping:               Activity: activity as tolerated    Diet: regular diet    Follow-up:    No follow-up provider specified. Patient Instructions: Continue ativan and morphine as needed  Follow up labs: none  Follow up imaging: none    Note that over 30 minutes was spent in preparing discharge papers, discussing discharge with patient, medication review, etc.      Ann Marie Fung MD, MD  Internal Medicine Resident, PGY-3  [address], [place]  11/30/2022, 2:46 PM

## 2022-11-30 NOTE — PLAN OF CARE
Problem: Discharge Planning  Goal: Discharge to home or other facility with appropriate resources  Outcome: Completed     Problem: Skin/Tissue Integrity  Goal: Absence of new skin breakdown  Description: 1. Monitor for areas of redness and/or skin breakdown  2. Assess vascular access sites hourly  3. Every 4-6 hours minimum:  Change oxygen saturation probe site  4. Every 4-6 hours:  If on nasal continuous positive airway pressure, respiratory therapy assess nares and determine need for appliance change or resting period.   Outcome: Completed     Problem: Safety - Adult  Goal: Free from fall injury  Outcome: Completed     Problem: ABCDS Injury Assessment  Goal: Absence of physical injury  Outcome: Completed     Problem: Chronic Conditions and Co-morbidities  Goal: Patient's chronic conditions and co-morbidity symptoms are monitored and maintained or improved  Outcome: Completed

## 2022-11-30 NOTE — PROGRESS NOTES
Informed per ED, Dr. Dagoberto Day had in depth conversation with Jennifer Hendrickson, patient's child and primary medical decision maker. Updated on patient's condition. Patient has terminal cancer and does not want to undergo treatment. Patient was supposed to meet with hospice in am.  Code status changed to [facility]. Palliative care consult initiated.

## 2022-11-30 NOTE — PROGRESS NOTES
Nutrition Note    Plan for hospice noted. Recommend diet as tolerated.     Electronically signed by Adali Lora RD, KEHINDE on 11/30/22 at 3:49 PM EST    Contact: [phone]

## 2022-11-30 NOTE — ED NOTES
Report given to  Clare Beauchamp RN from  PCU   Report method by phone   The following was reviewed with receiving RN:   Current vital signs:  BP (!) 90/52   Pulse (!) 119   Temp 98.6 °F (37 °C) (Oral)   Resp 21   Wt 181 lb (82.1 kg)   SpO2 90%   BMI 30.12 kg/m²                MEWS Score: 7     Any medication or safety alerts were reviewed. Any pending diagnostics and notifications were also reviewed, as well as any safety concerns or issues, abnormal labs, abnormal imaging, and abnormal assessment findings. Questions were answered.           Sha Paz RN  11/30/22 [id number]

## 2022-11-30 NOTE — ACP (ADVANCE CARE PLANNING)
Advance Care Planning     Advance Care Planning Activator (Inpatient)  Conversation Note      Date of ACP Conversation: 11/30/2022     ACP Activator: Tonya Eubanks RN    Health Care Decision Maker:     Current Designated Health Care Decision Maker:     Primary Decision Maker: Cameron Hughes - Child - [phone]    Primary Decision Maker: Jd Martinez - Child - [phone]    Primary Decision Maker: Berhane Christie Child - [phone]    Care Preferences    Ventilation: \"If you were in your present state of health and suddenly became very ill and were unable to breathe on your own, what would your preference be about the use of a ventilator (breathing machine) if it were available to you? \"      Would the patient desire the use of ventilator (breathing machine)?: no    \"If your health worsens and it becomes clear that your chance of recovery is unlikely, what would your preference be about the use of a ventilator (breathing machine) if it were available to you? \"     Would the patient desire the use of ventilator (breathing machine)?: No      Resuscitation  \"CPR works best to restart the heart when there is a sudden event, like a heart attack, in someone who is otherwise healthy. Unfortunately, CPR does not typically restart the heart for people who have serious health conditions or who are very sick. \"    \"In the event your heart stopped as a result of an underlying serious health condition, would you want attempts to be made to restart your heart (answer \"yes\" for attempt to resuscitate) or would you prefer a natural death (answer \"no\" for do not attempt to resuscitate)? \" no       [] Yes   [] No   Educated Patient / Cee Lao regarding differences between Advance Directives and portable DNR orders.     Length of ACP Conversation in minutes:      Conversation Outcomes:  [] ACP discussion completed  [] Existing advance directive reviewed with patient; no changes to patient's previously recorded wishes  [] New Advance Directive completed  [] Portable Do Not Rescitate prepared for Provider review and signature  [] POLST/POST/MOLST/MOST prepared for Provider review and signature      Follow-up plan:    [] Schedule follow-up conversation to continue planning  [] Referred individual to Provider for additional questions/concerns   [] Advised patient/agent/surrogate to review completed ACP document and update if needed with changes in condition, patient preferences or care setting    [] This note routed to one or more involved healthcare providers

## 2022-12-01 ENCOUNTER — TELEPHONE (OUTPATIENT)
Dept: INTERNAL MEDICINE | Age: 72
End: 2022-12-01

## 2022-12-01 NOTE — TELEPHONE ENCOUNTER
Dr. Indio Landis, just wanted to notify you this pt discharged from [facility] yesterday to in-house Hospice care.

## 2022-12-01 NOTE — PROGRESS NOTES
11/30/22 1454   Encounter Summary   Encounter Overview/Reason  Attempted Encounter   Service Provided For: Patient not available  (hospice meeting in progress)

## 2022-12-01 NOTE — PROGRESS NOTES
Writer received referral from [facility], to see patient and daughter for support; [place] visit with patient's daughter and patient; patient sleeping throughout visit but held writer's hand during prayer; daughter at bedside waiting for patient to be transported to hospice later today; daughter experiencing anticipatory grief; daughter talked about being patient's caregiver for the last 18 months; listening presence and support;     11/30/22 1540   Encounter Summary   Encounter Overview/Reason  Spiritual/Emotional Needs   Service Provided For: Patient and family together   Referral/Consult From: Dae Paz   Last Encounter  11/30/22   Complexity of Encounter Moderate   Spiritual/Emotional needs   Type Spiritual Support   Grief, Loss, and Adjustments   Type Hospice   Palliative Care   Type Palliative Care, Family Care   Assessment/Intervention/Outcome   Assessment Calm;Coping;Powerlessness;Sad;Tearful   Intervention Active listening;Discussed illness injury and its impact; Explored/Affirmed feelings, thoughts, concerns;Prayer (assurance of)/[place];Sustaining Presence/Ministry of presence   Outcome Coping;Engaged in conversation;Expressed feelings, needs, and concerns;Expressed Gratitude;Receptive

## 2022-12-04 LAB
CULTURE: NORMAL
CULTURE: NORMAL
SPECIMEN DESCRIPTION: NORMAL
SPECIMEN DESCRIPTION: NORMAL

## 2022-12-14 ENCOUNTER — TELEPHONE (OUTPATIENT)
Dept: GYNECOLOGIC ONCOLOGY | Age: 72
End: 2022-12-14

## 2022-12-14 NOTE — TELEPHONE ENCOUNTER
Called f/u on referral that was placed that pt declined for a previous appt. Spoke with son Susan Gallardo, and he stated pt is  as of 21.

## 2024-02-04 NOTE — TELEPHONE ENCOUNTER
PC to patient regarding blood sugars. Patient states she is taking Lantus 75 units twice daily now and blood sugars have been 150's. When I asked for actual readings, she did not have any recent. She states she has been very fatigued and sleeping all the time. I asked her if it is getting worse since being discharged but she states she was told by the doctors it could be awhile before she starts to feel better. She is unsure if she is better or worse since discharge. I scheduled her for follow up 09/11/18 @ 1:45 but instructed her to call if she worsens before then. If Lantus 75 units is appropriate, will you please change medication list so she can refill sooner and her insurance will cover. Thank-you!
yes